# Patient Record
Sex: FEMALE | Race: WHITE | NOT HISPANIC OR LATINO | URBAN - METROPOLITAN AREA
[De-identification: names, ages, dates, MRNs, and addresses within clinical notes are randomized per-mention and may not be internally consistent; named-entity substitution may affect disease eponyms.]

---

## 2019-10-02 ENCOUNTER — OUTPATIENT (OUTPATIENT)
Dept: OUTPATIENT SERVICES | Facility: HOSPITAL | Age: 74
LOS: 1 days | Discharge: HOME | End: 2019-10-02
Payer: MEDICARE

## 2019-10-02 VITALS
HEART RATE: 66 BPM | RESPIRATION RATE: 18 BRPM | SYSTOLIC BLOOD PRESSURE: 132 MMHG | DIASTOLIC BLOOD PRESSURE: 71 MMHG | OXYGEN SATURATION: 97 % | WEIGHT: 123.24 LBS | TEMPERATURE: 98 F | HEIGHT: 62 IN

## 2019-10-02 VITALS — HEIGHT: 62 IN | WEIGHT: 121.25 LBS

## 2019-10-02 DIAGNOSIS — Z98.890 OTHER SPECIFIED POSTPROCEDURAL STATES: ICD-10-CM

## 2019-10-02 DIAGNOSIS — J45.909 UNSPECIFIED ASTHMA, UNCOMPLICATED: ICD-10-CM

## 2019-10-02 DIAGNOSIS — Z91.89 OTHER SPECIFIED PERSONAL RISK FACTORS, NOT ELSEWHERE CLASSIFIED: ICD-10-CM

## 2019-10-02 DIAGNOSIS — Z01.818 ENCOUNTER FOR OTHER PREPROCEDURAL EXAMINATION: ICD-10-CM

## 2019-10-02 DIAGNOSIS — Z98.890 OTHER SPECIFIED POSTPROCEDURAL STATES: Chronic | ICD-10-CM

## 2019-10-02 DIAGNOSIS — M25.551 PAIN IN RIGHT HIP: ICD-10-CM

## 2019-10-02 DIAGNOSIS — M16.11 UNILATERAL PRIMARY OSTEOARTHRITIS, RIGHT HIP: ICD-10-CM

## 2019-10-02 LAB
ANION GAP SERPL CALC-SCNC: 8 MMOL/L — SIGNIFICANT CHANGE UP (ref 5–17)
BLD GP AB SCN SERPL QL: SIGNIFICANT CHANGE UP
BUN SERPL-MCNC: 21 MG/DL — SIGNIFICANT CHANGE UP (ref 7–23)
CALCIUM SERPL-MCNC: 9.8 MG/DL — SIGNIFICANT CHANGE UP (ref 8.5–10.1)
CHLORIDE SERPL-SCNC: 106 MMOL/L — SIGNIFICANT CHANGE UP (ref 96–108)
CO2 SERPL-SCNC: 28 MMOL/L — SIGNIFICANT CHANGE UP (ref 22–31)
CREAT SERPL-MCNC: 0.9 MG/DL — SIGNIFICANT CHANGE UP (ref 0.5–1.3)
GLUCOSE SERPL-MCNC: 124 MG/DL — HIGH (ref 70–99)
HBA1C BLD-MCNC: 5.6 % — SIGNIFICANT CHANGE UP (ref 4–5.6)
HCT VFR BLD CALC: 48 % — HIGH (ref 34.5–45)
HGB BLD-MCNC: 15.1 G/DL — SIGNIFICANT CHANGE UP (ref 11.5–15.5)
INR BLD: 0.98 RATIO — SIGNIFICANT CHANGE UP (ref 0.88–1.16)
MCHC RBC-ENTMCNC: 27.5 PG — SIGNIFICANT CHANGE UP (ref 27–34)
MCHC RBC-ENTMCNC: 31.5 GM/DL — LOW (ref 32–36)
MCV RBC AUTO: 87.4 FL — SIGNIFICANT CHANGE UP (ref 80–100)
MRSA PCR RESULT.: SIGNIFICANT CHANGE UP
NRBC # BLD: 0 /100 WBCS — SIGNIFICANT CHANGE UP (ref 0–0)
PLATELET # BLD AUTO: 223 K/UL — SIGNIFICANT CHANGE UP (ref 150–400)
POTASSIUM SERPL-MCNC: 3.9 MMOL/L — SIGNIFICANT CHANGE UP (ref 3.5–5.3)
POTASSIUM SERPL-SCNC: 3.9 MMOL/L — SIGNIFICANT CHANGE UP (ref 3.5–5.3)
PROTHROM AB SERPL-ACNC: 11 SEC — SIGNIFICANT CHANGE UP (ref 10–12.9)
RBC # BLD: 5.49 M/UL — HIGH (ref 3.8–5.2)
RBC # FLD: 14.9 % — HIGH (ref 10.3–14.5)
S AUREUS DNA NOSE QL NAA+PROBE: SIGNIFICANT CHANGE UP
SODIUM SERPL-SCNC: 142 MMOL/L — SIGNIFICANT CHANGE UP (ref 135–145)
WBC # BLD: 8.55 K/UL — SIGNIFICANT CHANGE UP (ref 3.8–10.5)
WBC # FLD AUTO: 8.55 K/UL — SIGNIFICANT CHANGE UP (ref 3.8–10.5)

## 2019-10-02 PROCEDURE — 93010 ELECTROCARDIOGRAM REPORT: CPT

## 2019-10-02 NOTE — OCCUPATIONAL THERAPY INITIAL EVALUATION ADULT - ADDITIONAL COMMENTS
Patient lives with  and son (Who can provide limited assist post op) in a private house with 5 steps to enter with a R handrail. Once inside, the patient has 8-9 steps with bilateral handrails that are reachable to the second floor where the main bedroom and bathroom is. The patient reported that she can stay on the main floor when she enters. The patients bathroom on the first floor has a walk in shower stall, fixed shower head, standard toilet seat and grab bars. The patient reports that shes can fit a 3/1 commode over the toilet at home. The patient ambulates with SAC in community and no device inside the home. The patient does not own any other device for ambulation. The patient is R handed, drives, wears glasses for reading and does not use hearing aides. The patient daily pain is a 2/10 at rest and a 7-8/10 with movement. The patient takes tylenol for pain management and uses ice and rest for pain management as well. The patient had L knee and ankle frx 7 years ago.

## 2019-10-02 NOTE — PHYSICAL THERAPY INITIAL EVALUATION ADULT - PERTINENT HX OF CURRENT PROBLEM, REHAB EVAL
Patient attends pre-op testing today following consult c Dr. El due to chronic pain to R hip. Elective R OBED is now scheduled in this facility for 10/15/2019.

## 2019-10-02 NOTE — PHYSICAL THERAPY INITIAL EVALUATION ADULT - ACTIVE RANGE OF MOTION EXAMINATION, REHAB EVAL
[place]. upper extremity Active ROM was WNL (within normal limits)/deficits as listed below/except R hip limited due to pain./bilateral lower extremity Active ROM was WNL (within normal limits)

## 2019-10-02 NOTE — H&P PST ADULT - NSICDXPASTMEDICALHX_GEN_ALL_CORE_FT
PAST MEDICAL HISTORY:  Anxiety disorder     Asthma     History of abdominal aortic aneurysm (AAA) Stent placed 3/22/19    HLD (hyperlipidemia)     OA (osteoarthritis) Right Hip

## 2019-10-02 NOTE — H&P PST ADULT - NSANTHOSAYNRD_GEN_A_CORE
No. KLAUS screening performed.  STOP BANG Legend: 0-2 = LOW Risk; 3-4 = INTERMEDIATE Risk; 5-8 = HIGH Risk

## 2019-10-02 NOTE — H&P PST ADULT - NEUROLOGICAL DETAILS
sensation intact/deep reflexes intact/alert and oriented x 3/responds to pain/responds to verbal commands

## 2019-10-02 NOTE — H&P PST ADULT - HISTORY OF PRESENT ILLNESS
74 year old female, PMH Asthma, HLD, Anxiety, OA right hip, AAA with stent 3/22/19 present to presurgical testing prior to scheduled right total hip replacement on 10/15/19. Patient c/o right hip pain 4/10 that started 18 months ago and continues to worsens.

## 2019-10-02 NOTE — OCCUPATIONAL THERAPY INITIAL EVALUATION ADULT - GENERAL OBSERVATIONS, REHAB EVAL
Chart reviewed. Patient seen seated in chair in PT/OT preoperative assessment room with no apparent distress. Patient underwent pre-operative consultation to determine current functional mobility limitations to determine appropriate need for assistive devices.

## 2019-10-02 NOTE — OCCUPATIONAL THERAPY INITIAL EVALUATION ADULT - FINE MOTOR COORDINATION, FINE MOTOR COORDINATION TESTS, OT EVAL
Patient-specific activity scoring scheme (Point to one number): 0 -------5-------- 10 (0) =Unable to perform activity (10) -- Able to perform activity at the same level as before injury or problem. Walking__5____, Activity: Standing_____5_______

## 2019-10-02 NOTE — H&P PST ADULT - RS GEN PE MLT RESP DETAILS PC
airway patent/breath sounds equal/good air movement/clear to auscultation bilaterally/no chest wall tenderness

## 2019-10-02 NOTE — H&P PST ADULT - ASSESSMENT
74 year old female, scheduled for right hip replacement 10/15/19. Medical, Cardiac and Pulmonary clearance pending.  CAPRINI SCORE [CLOT]    AGE RELATED RISK FACTORS                                                       MOBILITY RELATED FACTORS  [ ] Age 41-60 years                                            (1 Point)                  [ ] Bed rest                                                        (1 Point)  [ ] Age: 61-74 years                                           (2 Points)                 [ ] Plaster cast                                                   (2 Points)  x Age= 75 years                                              (3 Points)                 [ ] Bed bound for more than 72 hours                 (2 Points)    DISEASE RELATED RISK FACTORS                                               GENDER SPECIFIC FACTORS  [ ] Edema in the lower extremities                       (1 Point)                  [ ] Pregnancy                                                     (1 Point)  [ ] Varicose veins                                               (1 Point)                  [ ] Post-partum < 6 weeks                                   (1 Point)             [ ] BMI > 25 Kg/m2                                            (1 Point)                  [ ] Hormonal therapy  or oral contraception          (1 Point)                 [ ] Sepsis (in the previous month)                        (1 Point)                  [ ] History of pregnancy complications                 (1 point)  [ ] Pneumonia or serious lung disease                                               [ ] Unexplained or recurrent                     (1 Point)           (in the previous month)                               (1 Point)  [ ] Abnormal pulmonary function test                     (1 Point)                 SURGERY RELATED RISK FACTORS  [ ] Acute myocardial infarction                              (1 Point)                 [ ]  Section                                             (1 Point)  [ ] Congestive heart failure (in the previous month)  (1 Point)               [ ] Minor surgery                                                  (1 Point)   [ ] Inflammatory bowel disease                             (1 Point)                 [x ] Arthroscopic surgery                                        (2 Points)  [ ] Central venous access                                      (2 Points)                [ ] General surgery lasting more than 45 minutes   (2 Points)       [ ] Stroke (in the previous month)                          (5 Points)               [ ] Elective arthroplasty                                         (5 Points)                                                                                                                                               HEMATOLOGY RELATED FACTORS                                                 TRAUMA RELATED RISK FACTORS  [ ] Prior episodes of VTE                                     (3 Points)                [ ] Fracture of the hip, pelvis, or leg                       (5 Points)  [ ] Positive family history for VTE                         (3 Points)                 [ ] Acute spinal cord injury (in the previous month)  (5 Points)  [ ] Prothrombin 81070 A                                     (3 Points)                 [ ] Paralysis  (less than 1 month)                             (5 Points)  [ ] Factor V Leiden                                             (3 Points)                  [ ] Multiple Trauma within 1 month                        (5 Points)  [ ] Lupus anticoagulants                                     (3 Points)                                                           [ ] Anticardiolipin antibodies                               (3 Points)                                                       [ ] High homocysteine in the blood                      (3 Points)                                             [ ] Other congenital or acquired thrombophilia      (3 Points)                                                [ ] Heparin induced thrombocytopenia                  (3 Points)                                          Total Score [   4       ]    Caprini Score 0 - 2:  Low Risk, No VTE Prophylaxis required for most patients, encourage ambulation  Caprini Score 3 - 6:  At Risk, pharmacologic VTE prophylaxis is indicated for most patients (in the absence of a contraindication)  Caprini Score Greater than or = 7:  High Risk, pharmacologic VTE prophylaxis is indicated for most patients (in the absence of a contraindication)

## 2019-10-02 NOTE — H&P PST ADULT - NSICDXPROBLEM_GEN_ALL_CORE_FT
PROBLEM DIAGNOSES  Problem: S/P AAA repair  Assessment and Plan: Cardiology [place] pending    Problem: Asthma  Assessment and Plan: Pulm clearance pending    Problem: Right hip pain  Assessment and Plan: Right total hip replacement scheduled.    Problem: At high risk for venous thromboembolism (VTE)  Assessment and Plan: Require DVT prophylaxis after surgery

## 2019-10-02 NOTE — PHYSICAL THERAPY INITIAL EVALUATION ADULT - GENERAL OBSERVATIONS, REHAB EVAL
Chart reviewed. Patient seen seated in a chair in the rehab pre-op room with no apparent distress. Patient underwent pre-operative consultation to determine current functional mobility limitations to determine appropriate need for assistive devices.

## 2019-10-02 NOTE — PHYSICAL THERAPY INITIAL EVALUATION ADULT - RANGE OF MOTION EXAMINATION, REHAB EVAL
except R hip limited due to pain./bilateral upper extremity ROM was WNL (within normal limits)/bilateral lower extremity was ROM was WNL (within normal limits)/deficits as listed below

## 2019-10-02 NOTE — PHYSICAL THERAPY INITIAL EVALUATION ADULT - ADDITIONAL COMMENTS
Pt lives with her  and son (whom can provide assist upon D/C home) in a private home, 5 entry steps c R rail up, 8-9 steps c B/L rails(reachable) inside home.  Pt has a 2nd floor tub/shower combo, and 1st floor walk-in shower stall. Both with a fixed shower head, standard toilet seat height, & + grab bar. Pt states she is currently independent with all functional mobility including community ambulation with straight cane. Pt states she is independent with ADL's as well. Pt is right hand dominant, wears eye glasses, drives, & is out of work on disability. Pt reports daily 2/10 pain & states it is worse with any activity. Pt endorses taking narcotics for pain management. Goal of therapy: manage pain & improve functional mobility.

## 2020-01-04 PROBLEM — E78.5 HYPERLIPIDEMIA, UNSPECIFIED: Chronic | Status: ACTIVE | Noted: 2019-10-02

## 2020-01-04 PROBLEM — Z86.79 PERSONAL HISTORY OF OTHER DISEASES OF THE CIRCULATORY SYSTEM: Chronic | Status: ACTIVE | Noted: 2019-10-02

## 2020-01-04 PROBLEM — M19.90 UNSPECIFIED OSTEOARTHRITIS, UNSPECIFIED SITE: Chronic | Status: ACTIVE | Noted: 2019-10-02

## 2020-01-04 PROBLEM — F41.9 ANXIETY DISORDER, UNSPECIFIED: Chronic | Status: ACTIVE | Noted: 2019-10-02

## 2020-01-04 PROBLEM — J45.909 UNSPECIFIED ASTHMA, UNCOMPLICATED: Chronic | Status: ACTIVE | Noted: 2019-10-02

## 2020-01-07 ENCOUNTER — OUTPATIENT (OUTPATIENT)
Dept: OUTPATIENT SERVICES | Facility: HOSPITAL | Age: 75
LOS: 1 days | Discharge: HOME | End: 2020-01-07
Payer: MEDICARE

## 2020-01-07 VITALS
OXYGEN SATURATION: 99 % | SYSTOLIC BLOOD PRESSURE: 133 MMHG | DIASTOLIC BLOOD PRESSURE: 81 MMHG | TEMPERATURE: 98 F | HEART RATE: 71 BPM | WEIGHT: 126.99 LBS | HEIGHT: 62 IN | RESPIRATION RATE: 18 BRPM

## 2020-01-07 VITALS — WEIGHT: 126.99 LBS | HEIGHT: 62 IN

## 2020-01-07 DIAGNOSIS — Z98.890 OTHER SPECIFIED POSTPROCEDURAL STATES: Chronic | ICD-10-CM

## 2020-01-07 DIAGNOSIS — Z01.818 ENCOUNTER FOR OTHER PREPROCEDURAL EXAMINATION: ICD-10-CM

## 2020-01-07 DIAGNOSIS — E78.5 HYPERLIPIDEMIA, UNSPECIFIED: ICD-10-CM

## 2020-01-07 DIAGNOSIS — Z86.79 PERSONAL HISTORY OF OTHER DISEASES OF THE CIRCULATORY SYSTEM: ICD-10-CM

## 2020-01-07 DIAGNOSIS — J45.909 UNSPECIFIED ASTHMA, UNCOMPLICATED: ICD-10-CM

## 2020-01-07 DIAGNOSIS — M19.90 UNSPECIFIED OSTEOARTHRITIS, UNSPECIFIED SITE: ICD-10-CM

## 2020-01-07 LAB
ANION GAP SERPL CALC-SCNC: 6 MMOL/L — SIGNIFICANT CHANGE UP (ref 5–17)
APTT BLD: 30.3 SEC — SIGNIFICANT CHANGE UP (ref 27.5–36.3)
BLD GP AB SCN SERPL QL: SIGNIFICANT CHANGE UP
BUN SERPL-MCNC: 22 MG/DL — SIGNIFICANT CHANGE UP (ref 7–23)
CALCIUM SERPL-MCNC: 9.3 MG/DL — SIGNIFICANT CHANGE UP (ref 8.5–10.1)
CHLORIDE SERPL-SCNC: 107 MMOL/L — SIGNIFICANT CHANGE UP (ref 96–108)
CO2 SERPL-SCNC: 28 MMOL/L — SIGNIFICANT CHANGE UP (ref 22–31)
CREAT SERPL-MCNC: 0.84 MG/DL — SIGNIFICANT CHANGE UP (ref 0.5–1.3)
GLUCOSE SERPL-MCNC: 94 MG/DL — SIGNIFICANT CHANGE UP (ref 70–99)
HBA1C BLD-MCNC: 5.8 % — HIGH (ref 4–5.6)
HCT VFR BLD CALC: 46.7 % — HIGH (ref 34.5–45)
HGB BLD-MCNC: 14.8 G/DL — SIGNIFICANT CHANGE UP (ref 11.5–15.5)
INR BLD: 0.96 RATIO — SIGNIFICANT CHANGE UP (ref 0.88–1.16)
MCHC RBC-ENTMCNC: 27.5 PG — SIGNIFICANT CHANGE UP (ref 27–34)
MCHC RBC-ENTMCNC: 31.7 GM/DL — LOW (ref 32–36)
MCV RBC AUTO: 86.8 FL — SIGNIFICANT CHANGE UP (ref 80–100)
MRSA PCR RESULT.: SIGNIFICANT CHANGE UP
NRBC # BLD: 0 /100 WBCS — SIGNIFICANT CHANGE UP (ref 0–0)
PLATELET # BLD AUTO: 240 K/UL — SIGNIFICANT CHANGE UP (ref 150–400)
POTASSIUM SERPL-MCNC: 4.1 MMOL/L — SIGNIFICANT CHANGE UP (ref 3.5–5.3)
POTASSIUM SERPL-SCNC: 4.1 MMOL/L — SIGNIFICANT CHANGE UP (ref 3.5–5.3)
PROTHROM AB SERPL-ACNC: 10.7 SEC — SIGNIFICANT CHANGE UP (ref 10–12.9)
RBC # BLD: 5.38 M/UL — HIGH (ref 3.8–5.2)
RBC # FLD: 14.6 % — HIGH (ref 10.3–14.5)
S AUREUS DNA NOSE QL NAA+PROBE: SIGNIFICANT CHANGE UP
SODIUM SERPL-SCNC: 141 MMOL/L — SIGNIFICANT CHANGE UP (ref 135–145)
WBC # BLD: 14.32 K/UL — HIGH (ref 3.8–10.5)
WBC # FLD AUTO: 14.32 K/UL — HIGH (ref 3.8–10.5)

## 2020-01-07 PROCEDURE — 93010 ELECTROCARDIOGRAM REPORT: CPT

## 2020-01-07 RX ORDER — THEOPHYLLINE ANHYDROUS 200 MG
1 TABLET, EXTENDED RELEASE 12 HR ORAL
Qty: 0 | Refills: 0 | DISCHARGE

## 2020-01-07 NOTE — H&P PST ADULT - ASSESSMENT
75 y/o F w/ PMHX of HLD, Asthma, OA, AAA s/p stent (3/22/2019) now here w/ right hip pain scheduled for Right Hip Replacement.  Denies of chest pain, SOB, headache, dizziness, N/V/D, abdominal pain.

## 2020-01-07 NOTE — H&P PST ADULT - NSICDXPROBLEM_GEN_ALL_CORE_FT
PROBLEM DIAGNOSES  Problem: OA (osteoarthritis)  Assessment and Plan: - scheduled for Right Hip replacement  - awaiting medical, cardiac, and vascular surgeon clearance  - NPO after midnight for surgery  - patient is not on ASA, Plavix or any anticoagulants      Problem: History of abdominal aortic aneurysm (AAA)  Assessment and Plan: - awaiting vascular clearance  - NM stress test =negative    Problem: Hyperlipemia  Assessment and Plan: c/w Simvastatin    Problem: Asthma  Assessment and Plan: c/w Prednisone, Symbicort

## 2020-01-07 NOTE — H&P PST ADULT - EKG AND INTERPRETATION
NSR @ 69bpm, T wave inversion on V4, V5, AVL GA interval: 106ms, QRS duration: 70ms, QT/QTc 372/398ms, NM stress test=negative

## 2020-01-07 NOTE — PHYSICAL THERAPY INITIAL EVALUATION ADULT - PERTINENT HX OF CURRENT PROBLEM, REHAB EVAL
Patient attends pre-op testing today following consult c Dr. El due to chronic pain to R hip. Elective R OEBD(posterior) is now scheduled in this facility for 1/21/2020.

## 2020-01-07 NOTE — PHYSICAL THERAPY INITIAL EVALUATION ADULT - ACTIVE RANGE OF MOTION EXAMINATION, REHAB EVAL
deficits as listed below/except R hip limited due to pain./bilateral lower extremity Active ROM was WNL (within normal limits)/[place]. upper extremity Active ROM was WNL (within normal limits)

## 2020-01-07 NOTE — PHYSICAL THERAPY INITIAL EVALUATION ADULT - ADDITIONAL COMMENTS
Pt lives with her  (whom can provide limited assist upon D/C home) in a private home, 1 entry step(no rail, wide enough to place a rolling walker), inside home c 5 steps c L rail up + 1 flight of stairs c B/L rails(reachable).  Pt has a tub/shower combo on the 2nd floor and walk-in shower stall on 1st floor with a fixed shower head, standard toilet seat height, & + grab bar. Pt states she is currently independent with all functional mobility including community ambulation with straight cane. Pt states she is independent with ADL's as well. Pt is right hand dominant, wears eye glasses, drives, & is retired. Pt reports daily 2/10 pain & states it is worse with any activity. Pt endorses taking pain medications.

## 2020-01-07 NOTE — H&P PST ADULT - HISTORY OF PRESENT ILLNESS
73 y/o F w/ PMHX of HLD, Asthma, OA, AAA s/p stent (3/22/2019) now here w/ right hip pain scheduled for Right Hip Replacement.  Denies of chest pain, SOB, headache, dizziness, N/V/D, abdominal pain.

## 2020-01-20 ENCOUNTER — TRANSCRIPTION ENCOUNTER (OUTPATIENT)
Age: 75
End: 2020-01-20

## 2020-01-20 RX ORDER — PANTOPRAZOLE SODIUM 20 MG/1
40 TABLET, DELAYED RELEASE ORAL
Refills: 0 | Status: DISCONTINUED | OUTPATIENT
Start: 2020-01-21 | End: 2020-01-27

## 2020-01-20 RX ORDER — SENNA PLUS 8.6 MG/1
2 TABLET ORAL AT BEDTIME
Refills: 0 | Status: DISCONTINUED | OUTPATIENT
Start: 2020-01-21 | End: 2020-01-27

## 2020-01-20 RX ORDER — FERROUS SULFATE 325(65) MG
325 TABLET ORAL
Refills: 0 | Status: DISCONTINUED | OUTPATIENT
Start: 2020-01-21 | End: 2020-01-22

## 2020-01-20 RX ORDER — HYDROMORPHONE HYDROCHLORIDE 2 MG/ML
0.5 INJECTION INTRAMUSCULAR; INTRAVENOUS; SUBCUTANEOUS EVERY 4 HOURS
Refills: 0 | Status: DISCONTINUED | OUTPATIENT
Start: 2020-01-21 | End: 2020-01-27

## 2020-01-20 RX ORDER — FOLIC ACID 0.8 MG
1 TABLET ORAL DAILY
Refills: 0 | Status: DISCONTINUED | OUTPATIENT
Start: 2020-01-21 | End: 2020-01-22

## 2020-01-20 RX ORDER — MAGNESIUM HYDROXIDE 400 MG/1
30 TABLET, CHEWABLE ORAL DAILY
Refills: 0 | Status: DISCONTINUED | OUTPATIENT
Start: 2020-01-21 | End: 2020-01-27

## 2020-01-20 RX ORDER — POLYETHYLENE GLYCOL 3350 17 G/17G
17 POWDER, FOR SOLUTION ORAL DAILY
Refills: 0 | Status: DISCONTINUED | OUTPATIENT
Start: 2020-01-21 | End: 2020-01-27

## 2020-01-20 RX ORDER — ACETAMINOPHEN 500 MG
975 TABLET ORAL EVERY 8 HOURS
Refills: 0 | Status: COMPLETED | OUTPATIENT
Start: 2020-01-21 | End: 2020-01-24

## 2020-01-20 RX ORDER — SODIUM CHLORIDE 9 MG/ML
1000 INJECTION INTRAMUSCULAR; INTRAVENOUS; SUBCUTANEOUS
Refills: 0 | Status: DISCONTINUED | OUTPATIENT
Start: 2020-01-21 | End: 2020-01-25

## 2020-01-20 RX ORDER — ONDANSETRON 8 MG/1
4 TABLET, FILM COATED ORAL EVERY 6 HOURS
Refills: 0 | Status: DISCONTINUED | OUTPATIENT
Start: 2020-01-21 | End: 2020-01-27

## 2020-01-21 ENCOUNTER — INPATIENT (INPATIENT)
Facility: HOSPITAL | Age: 75
LOS: 5 days | Discharge: HOME HEALTH | End: 2020-01-27
Attending: ORTHOPAEDIC SURGERY | Admitting: ORTHOPAEDIC SURGERY
Payer: MEDICARE

## 2020-01-21 ENCOUNTER — RESULT REVIEW (OUTPATIENT)
Age: 75
End: 2020-01-21

## 2020-01-21 VITALS
HEART RATE: 78 BPM | TEMPERATURE: 99 F | RESPIRATION RATE: 16 BRPM | HEIGHT: 62 IN | WEIGHT: 125 LBS | SYSTOLIC BLOOD PRESSURE: 132 MMHG | DIASTOLIC BLOOD PRESSURE: 70 MMHG | OXYGEN SATURATION: 99 %

## 2020-01-21 DIAGNOSIS — Z98.890 OTHER SPECIFIED POSTPROCEDURAL STATES: Chronic | ICD-10-CM

## 2020-01-21 LAB
BLD GP AB SCN SERPL QL: SIGNIFICANT CHANGE UP
HCT VFR BLD CALC: 41.6 % — SIGNIFICANT CHANGE UP (ref 34.5–45)
HGB BLD-MCNC: 13.1 G/DL — SIGNIFICANT CHANGE UP (ref 11.5–15.5)
MCHC RBC-ENTMCNC: 27.9 PG — SIGNIFICANT CHANGE UP (ref 27–34)
MCHC RBC-ENTMCNC: 31.5 GM/DL — LOW (ref 32–36)
MCV RBC AUTO: 88.5 FL — SIGNIFICANT CHANGE UP (ref 80–100)
NRBC # BLD: 0 /100 WBCS — SIGNIFICANT CHANGE UP (ref 0–0)
PLATELET # BLD AUTO: 168 K/UL — SIGNIFICANT CHANGE UP (ref 150–400)
RBC # BLD: 4.7 M/UL — SIGNIFICANT CHANGE UP (ref 3.8–5.2)
RBC # FLD: 14.6 % — HIGH (ref 10.3–14.5)
WBC # BLD: 13.07 K/UL — HIGH (ref 3.8–10.5)
WBC # FLD AUTO: 13.07 K/UL — HIGH (ref 3.8–10.5)

## 2020-01-21 PROCEDURE — 88311 DECALCIFY TISSUE: CPT | Mod: 26

## 2020-01-21 PROCEDURE — 88305 TISSUE EXAM BY PATHOLOGIST: CPT | Mod: 26

## 2020-01-21 RX ORDER — DIAZEPAM 5 MG
2 TABLET ORAL DAILY
Refills: 0 | Status: DISCONTINUED | OUTPATIENT
Start: 2020-01-21 | End: 2020-01-27

## 2020-01-21 RX ORDER — ACETAMINOPHEN 500 MG
650 TABLET ORAL ONCE
Refills: 0 | Status: COMPLETED | OUTPATIENT
Start: 2020-01-21 | End: 2020-01-24

## 2020-01-21 RX ORDER — OXYCODONE HYDROCHLORIDE 5 MG/1
5 TABLET ORAL EVERY 4 HOURS
Refills: 0 | Status: DISCONTINUED | OUTPATIENT
Start: 2020-01-21 | End: 2020-01-26

## 2020-01-21 RX ORDER — SIMVASTATIN 20 MG/1
1 TABLET, FILM COATED ORAL
Qty: 0 | Refills: 0 | DISCHARGE

## 2020-01-21 RX ORDER — SODIUM CHLORIDE 9 MG/ML
1000 INJECTION, SOLUTION INTRAVENOUS
Refills: 0 | Status: DISCONTINUED | OUTPATIENT
Start: 2020-01-21 | End: 2020-01-21

## 2020-01-21 RX ORDER — FENTANYL CITRATE 50 UG/ML
50 INJECTION INTRAVENOUS
Refills: 0 | Status: DISCONTINUED | OUTPATIENT
Start: 2020-01-21 | End: 2020-01-21

## 2020-01-21 RX ORDER — OXYCODONE HYDROCHLORIDE 5 MG/1
10 TABLET ORAL EVERY 4 HOURS
Refills: 0 | Status: DISCONTINUED | OUTPATIENT
Start: 2020-01-21 | End: 2020-01-26

## 2020-01-21 RX ORDER — ACETAMINOPHEN 500 MG
650 TABLET ORAL ONCE
Refills: 0 | Status: COMPLETED | OUTPATIENT
Start: 2020-01-21 | End: 2020-01-21

## 2020-01-21 RX ORDER — FENTANYL CITRATE 50 UG/ML
25 INJECTION INTRAVENOUS
Refills: 0 | Status: DISCONTINUED | OUTPATIENT
Start: 2020-01-21 | End: 2020-01-21

## 2020-01-21 RX ORDER — SIMVASTATIN 20 MG/1
20 TABLET, FILM COATED ORAL AT BEDTIME
Refills: 0 | Status: DISCONTINUED | OUTPATIENT
Start: 2020-01-21 | End: 2020-01-27

## 2020-01-21 RX ORDER — ALBUTEROL 90 UG/1
2 AEROSOL, METERED ORAL
Qty: 0 | Refills: 0 | DISCHARGE

## 2020-01-21 RX ORDER — ALBUTEROL 90 UG/1
2 AEROSOL, METERED ORAL EVERY 6 HOURS
Refills: 0 | Status: DISCONTINUED | OUTPATIENT
Start: 2020-01-21 | End: 2020-01-27

## 2020-01-21 RX ORDER — CEFAZOLIN SODIUM 1 G
2000 VIAL (EA) INJECTION EVERY 8 HOURS
Refills: 0 | Status: COMPLETED | OUTPATIENT
Start: 2020-01-21 | End: 2020-01-22

## 2020-01-21 RX ORDER — RIVAROXABAN 15 MG-20MG
10 KIT ORAL DAILY
Refills: 0 | Status: DISCONTINUED | OUTPATIENT
Start: 2020-01-22 | End: 2020-01-27

## 2020-01-21 RX ADMIN — SIMVASTATIN 20 MILLIGRAM(S): 20 TABLET, FILM COATED ORAL at 21:14

## 2020-01-21 RX ADMIN — Medication 100 MILLIGRAM(S): at 20:17

## 2020-01-21 RX ADMIN — SODIUM CHLORIDE 100 MILLILITER(S): 9 INJECTION, SOLUTION INTRAVENOUS at 13:50

## 2020-01-21 RX ADMIN — Medication 325 MILLIGRAM(S): at 16:28

## 2020-01-21 RX ADMIN — Medication 650 MILLIGRAM(S): at 10:25

## 2020-01-21 RX ADMIN — Medication 975 MILLIGRAM(S): at 22:15

## 2020-01-21 RX ADMIN — SODIUM CHLORIDE 100 MILLILITER(S): 9 INJECTION INTRAMUSCULAR; INTRAVENOUS; SUBCUTANEOUS at 15:46

## 2020-01-21 RX ADMIN — Medication 975 MILLIGRAM(S): at 21:15

## 2020-01-21 NOTE — PHYSICAL THERAPY INITIAL EVALUATION ADULT - GAIT TRAINING, PT EVAL
Patient will ambulate 500 feet with rolling walker independently for community ambulation in 5-7 days.

## 2020-01-21 NOTE — OCCUPATIONAL THERAPY INITIAL EVALUATION ADULT - TRANSFER SAFETY CONCERNS NOTED: SIT/STAND, REHAB EVAL
decreased step length/decreased balance during turns/losing balance/decreased weight-shifting ability

## 2020-01-21 NOTE — CONSULT NOTE ADULT - SUBJECTIVE AND OBJECTIVE BOX
MAYTE PEREIRA is a 74y Female s/p RIGHT ANTERIOR TOTAL HIP REPLACEMENT  RIGHT POSTERIOR TOTAL HIP REPLACEMENT    w/ h/o History of abdominal aortic aneurysm (AAA)  Anxiety disorder  OA (osteoarthritis)  HLD (hyperlipidemia)  Asthma    denies any chest pain shortness of breath palpitation dizziness lightheadedness nausea vomiting fever or chills    S/P left knee surgery  S/P AAA repair      SH: doesnot smoke or drink at this time    aspirin (Short breath)  penicillin (Stomach Upset)  Sulfacetamide Sodium-Sulfur (Stomach Upset)    acetaminophen   Tablet .. 650 milliGRAM(s) Oral once  acetaminophen   Tablet .. 975 milliGRAM(s) Oral every 8 hours  ALBUTerol    90 MICROgram(s) HFA Inhaler 2 Puff(s) Inhalation every 6 hours PRN  aluminum hydroxide/magnesium hydroxide/simethicone Suspension 30 milliLiter(s) Oral four times a day PRN  ceFAZolin   IVPB 2000 milliGRAM(s) IV Intermittent every 8 hours  diazepam    Tablet 2 milliGRAM(s) Oral daily PRN  ferrous    sulfate 325 milliGRAM(s) Oral three times a day with meals  folic acid 1 milliGRAM(s) Oral daily  HYDROmorphone  Injectable 0.5 milliGRAM(s) IV Push every 4 hours PRN  magnesium hydroxide Suspension 30 milliLiter(s) Oral daily PRN  multivitamin 1 Tablet(s) Oral daily  ondansetron Injectable 4 milliGRAM(s) IV Push every 6 hours PRN  oxyCODONE    IR 5 milliGRAM(s) Oral every 4 hours PRN  oxyCODONE    IR 10 milliGRAM(s) Oral every 4 hours PRN  pantoprazole    Tablet 40 milliGRAM(s) Oral before breakfast  polyethylene glycol 3350 17 Gram(s) Oral daily  senna 2 Tablet(s) Oral at bedtime PRN  simvastatin 20 milliGRAM(s) Oral at bedtime  sodium chloride 0.9%. 1000 milliLiter(s) IV Continuous <Continuous>    T(C): 36.6 (01-21-20 @ 16:57), Max: 37 (01-21-20 @ 09:06)  HR: 66 (01-21-20 @ 19:12) (62 - 78)  BP: 108/48 (01-21-20 @ 19:12) (101/57 - 132/70)  RR: 18 (01-21-20 @ 19:12) (14 - 18)  SpO2: 97% (01-21-20 @ 19:12) (96% - 100%)  HEENT unremarkable  neck no JVD or bruit  heart normal S1 S2 RRR no gallops or rubs  chest clear to auscultation  abd sof nontender non distended +bs  ext no calf tenderness    A/P   DVT PX  pain control  bowel regimen   wound care as per ortho  GI PX  antiemetics prn  incentive spirometer  chol control  resp rx

## 2020-01-21 NOTE — PHYSICAL THERAPY INITIAL EVALUATION ADULT - CRITERIA FOR SKILLED THERAPEUTIC INTERVENTIONS
impairments found/functional limitations in following categories/predicted duration of therapy intervention/therapy frequency/anticipated equipment needs at discharge/anticipated discharge recommendation/risk reduction/prevention/rehab potential

## 2020-01-21 NOTE — PROGRESS NOTE ADULT - SUBJECTIVE AND OBJECTIVE BOX
POC  75 yo Female s/p right anterior OBED pod 0.  Pt seen and examined at bedside in NAD.  Currently, pain is well controlled.  Pt denies any new complaints.  Pt explains that when she takes aspirin she becomes short of breath.  Pt denies CP/SOB/N/V/D/numbness/tingling/bowel or bladder dysfunction.     PE:   R LE:  Prineo dressing in place.  Dressing C/D/I.  +ROM hip/knee/ankle/toes.  Ankle Dorsi/plantarflexion: 5/5.  Calf: soft, compressible and nontender.  DP/PT 2+  NVI.                         13.1   13.07 )-----------( 168      ( 21 Jan 2020 13:51 )             41.6                 A/P: 74yFemale s/p R anterior OBED pod 0.  Pain controlled  +/- Total hip precautions  PT: RLE WBAT   DVT ppx: SCDs, xarelto, ambulation  Wound care - prineo   Isometric exercises encouraged   incentive spirometry encouraged  Follow up am labs  Abx x 24 hrs  Discharge: planning   All the above discussed and understood by pt

## 2020-01-21 NOTE — PHYSICAL THERAPY INITIAL EVALUATION ADULT - ASR EQUIP NEEDS DISCH PT EVAL
3:1 commode/Patient has own straight cane and reports she received rolling walker Patient has all DME

## 2020-01-21 NOTE — BRIEF OPERATIVE NOTE - NSICDXBRIEFPOSTOP_GEN_ALL_CORE_FT
POST-OP DIAGNOSIS:  Primary localized osteoarthrosis of right hip 21-Jan-2020 13:31:17  Jensen Patten

## 2020-01-21 NOTE — BRIEF OPERATIVE NOTE - NSICDXBRIEFPREOP_GEN_ALL_CORE_FT
PRE-OP DIAGNOSIS:  Primary localized osteoarthrosis of right hip 21-Jan-2020 13:31:05  Jensen Patten

## 2020-01-21 NOTE — PHYSICAL THERAPY INITIAL EVALUATION ADULT - STRENGTHENING, PT EVAL
Patient will improve strength in R hip to 5/5 6-8 weeks to improve overall functional mobility including gait, transfers, bed mobility and decrease risk of falls.

## 2020-01-21 NOTE — PHYSICAL THERAPY INITIAL EVALUATION ADULT - ACTIVE RANGE OF MOTION EXAMINATION, REHAB EVAL
deficits as listed below/bilateral upper extremity Active ROM was WFL (within functional limits)/bilateral  lower extremity Active ROM was WFL (within functional limits)/except R hip within anterior precautions

## 2020-01-21 NOTE — PHYSICAL THERAPY INITIAL EVALUATION ADULT - GAIT DEVIATIONS NOTED, PT EVAL
decreased jeniffer/decreased velocity of limb motion/decreased step length/decreased stride length/decreased weight-shifting ability

## 2020-01-21 NOTE — OCCUPATIONAL THERAPY INITIAL EVALUATION ADULT - ADDITIONAL COMMENTS
Pre op assessment- Pt lives with her  (whom can provide limited assist upon D/C home) in a private home, 1 entry step(no rail, wide enough to place a rolling walker), inside home c 5 steps c L rail up + 1 flight of stairs c B/L rails(reachable).  Pt has a tub/shower combo on the 2nd floor and walk-in shower stall on 1st floor with a fixed shower head, standard toilet seat height, & + grab bar. Pt states she is currently independent with all functional mobility including community ambulation with straight cane. Pt states she is independent with ADL's as well. Pt is right hand dominant, wears eye glasses, drives, & is retired.

## 2020-01-21 NOTE — OCCUPATIONAL THERAPY INITIAL EVALUATION ADULT - PLANNED THERAPY INTERVENTIONS, OT EVAL
ADL retraining/bed mobility training/balance training/ROM/strengthening/stretching/transfer training

## 2020-01-21 NOTE — PHYSICAL THERAPY INITIAL EVALUATION ADULT - ADDITIONAL COMMENTS
As per pre-op and reviewed with patient POD #0 to ensure accuracy: Pt lives with her  (whom can provide limited assist upon D/C home) in a private home, 1 entry step (no rail, wide enough to place a rolling walker), inside home c 5 steps c L rail up + 1 flight of stairs c B/L rails (reachable).  Pt has a tub/shower combo on the 2nd floor and walk-in shower stall on 1st floor with a fixed shower head, standard toilet seat height, & + grab bar. Pt states she is currently independent with all functional mobility including community ambulation with straight cane. Pt states she is independent with ADL's as well. Pt is right hand dominant, wears eye glasses, drives, & is retired. Pt reports daily 2/10 pain & states it is worse with any activity. Pt endorses taking pain medications.

## 2020-01-22 ENCOUNTER — TRANSCRIPTION ENCOUNTER (OUTPATIENT)
Age: 75
End: 2020-01-22

## 2020-01-22 LAB
ANION GAP SERPL CALC-SCNC: 8 MMOL/L — SIGNIFICANT CHANGE UP (ref 5–17)
BUN SERPL-MCNC: 23 MG/DL — SIGNIFICANT CHANGE UP (ref 7–23)
CALCIUM SERPL-MCNC: 8.8 MG/DL — SIGNIFICANT CHANGE UP (ref 8.5–10.1)
CHLORIDE SERPL-SCNC: 110 MMOL/L — HIGH (ref 96–108)
CO2 SERPL-SCNC: 27 MMOL/L — SIGNIFICANT CHANGE UP (ref 22–31)
CREAT SERPL-MCNC: 0.8 MG/DL — SIGNIFICANT CHANGE UP (ref 0.5–1.3)
GLUCOSE SERPL-MCNC: 106 MG/DL — HIGH (ref 70–99)
HCT VFR BLD CALC: 38.9 % — SIGNIFICANT CHANGE UP (ref 34.5–45)
HGB BLD-MCNC: 12.1 G/DL — SIGNIFICANT CHANGE UP (ref 11.5–15.5)
MCHC RBC-ENTMCNC: 27.4 PG — SIGNIFICANT CHANGE UP (ref 27–34)
MCHC RBC-ENTMCNC: 31.1 GM/DL — LOW (ref 32–36)
MCV RBC AUTO: 88.2 FL — SIGNIFICANT CHANGE UP (ref 80–100)
NRBC # BLD: 0 /100 WBCS — SIGNIFICANT CHANGE UP (ref 0–0)
PLATELET # BLD AUTO: 177 K/UL — SIGNIFICANT CHANGE UP (ref 150–400)
POTASSIUM SERPL-MCNC: 4.8 MMOL/L — SIGNIFICANT CHANGE UP (ref 3.5–5.3)
POTASSIUM SERPL-SCNC: 4.8 MMOL/L — SIGNIFICANT CHANGE UP (ref 3.5–5.3)
RBC # BLD: 4.41 M/UL — SIGNIFICANT CHANGE UP (ref 3.8–5.2)
RBC # FLD: 14.9 % — HIGH (ref 10.3–14.5)
SODIUM SERPL-SCNC: 145 MMOL/L — SIGNIFICANT CHANGE UP (ref 135–145)
WBC # BLD: 14.57 K/UL — HIGH (ref 3.8–10.5)
WBC # FLD AUTO: 14.57 K/UL — HIGH (ref 3.8–10.5)

## 2020-01-22 RX ORDER — OXYCODONE HYDROCHLORIDE 5 MG/1
1 TABLET ORAL
Qty: 0 | Refills: 0 | DISCHARGE
Start: 2020-01-22

## 2020-01-22 RX ORDER — UBIDECARENONE 100 MG
1 CAPSULE ORAL
Qty: 0 | Refills: 0 | DISCHARGE

## 2020-01-22 RX ORDER — PANTOPRAZOLE SODIUM 20 MG/1
1 TABLET, DELAYED RELEASE ORAL
Qty: 0 | Refills: 0 | DISCHARGE
Start: 2020-01-22

## 2020-01-22 RX ORDER — POLYETHYLENE GLYCOL 3350 17 G/17G
17 POWDER, FOR SOLUTION ORAL
Qty: 0 | Refills: 0 | DISCHARGE
Start: 2020-01-22

## 2020-01-22 RX ORDER — ACETAMINOPHEN 500 MG
3 TABLET ORAL
Qty: 0 | Refills: 0 | DISCHARGE
Start: 2020-01-22

## 2020-01-22 RX ORDER — RIVAROXABAN 15 MG-20MG
1 KIT ORAL
Qty: 0 | Refills: 0 | DISCHARGE
Start: 2020-01-22

## 2020-01-22 RX ORDER — MAGNESIUM HYDROXIDE 400 MG/1
30 TABLET, CHEWABLE ORAL
Qty: 0 | Refills: 0 | DISCHARGE
Start: 2020-01-22

## 2020-01-22 RX ADMIN — Medication 975 MILLIGRAM(S): at 13:44

## 2020-01-22 RX ADMIN — Medication 975 MILLIGRAM(S): at 06:51

## 2020-01-22 RX ADMIN — Medication 975 MILLIGRAM(S): at 14:44

## 2020-01-22 RX ADMIN — RIVAROXABAN 10 MILLIGRAM(S): KIT at 11:21

## 2020-01-22 RX ADMIN — Medication 975 MILLIGRAM(S): at 07:30

## 2020-01-22 RX ADMIN — PANTOPRAZOLE SODIUM 40 MILLIGRAM(S): 20 TABLET, DELAYED RELEASE ORAL at 06:51

## 2020-01-22 RX ADMIN — POLYETHYLENE GLYCOL 3350 17 GRAM(S): 17 POWDER, FOR SOLUTION ORAL at 11:21

## 2020-01-22 RX ADMIN — SIMVASTATIN 20 MILLIGRAM(S): 20 TABLET, FILM COATED ORAL at 21:26

## 2020-01-22 RX ADMIN — Medication 1 TABLET(S): at 11:22

## 2020-01-22 RX ADMIN — Medication 325 MILLIGRAM(S): at 08:05

## 2020-01-22 RX ADMIN — OXYCODONE HYDROCHLORIDE 5 MILLIGRAM(S): 5 TABLET ORAL at 00:51

## 2020-01-22 RX ADMIN — OXYCODONE HYDROCHLORIDE 5 MILLIGRAM(S): 5 TABLET ORAL at 02:51

## 2020-01-22 RX ADMIN — Medication 10 MILLIGRAM(S): at 12:15

## 2020-01-22 RX ADMIN — Medication 100 MILLIGRAM(S): at 03:36

## 2020-01-22 RX ADMIN — Medication 975 MILLIGRAM(S): at 21:26

## 2020-01-22 RX ADMIN — Medication 975 MILLIGRAM(S): at 22:26

## 2020-01-22 NOTE — DISCHARGE NOTE PROVIDER - CARE PROVIDER_API CALL
Joaquín El)  Orthopaedic Surgery  [address] [place] [address]  Phone: [phone]  Fax: [phone]  Follow Up Time:

## 2020-01-22 NOTE — DISCHARGE NOTE PROVIDER - NSDCMRMEDTOKEN_GEN_ALL_CORE_FT
acetaminophen 325 mg oral tablet: 3 tab(s) orally every 8 hours  diazePAM 2 mg oral tablet: 1 tab(s) orally once a day, As Needed  magnesium hydroxide 8% oral suspension: 30 milliliter(s) orally once a day, As needed, Constipation  oxyCODONE 10 mg oral tablet: 1 tab(s) orally every 4 hours, As needed, Pain 6-10  oxyCODONE 5 mg oral tablet: 1 tab(s) orally every 4 hours, As needed, Pain 1-5  pantoprazole 40 mg oral delayed release tablet: 1 tab(s) orally once a day (before a meal)  polyethylene glycol 3350 oral powder for reconstitution: 17 gram(s) orally once a day  predniSONE 10 mg oral tablet: 1 tab(s) orally once a day  ProAir HFA 90 mcg/inh inhalation aerosol: 2 puff(s) inhaled 4 times a day, As Needed  rivaroxaban 10 mg oral tablet: 1 tab(s) orally once a day for 35 days post operatively  simvastatin 20 mg oral tablet: 1 tab(s) orally once a day (at bedtime) acetaminophen 325 mg oral tablet: 3 tab(s) orally every 8 hours  diazePAM 2 mg oral tablet: 1 tab(s) orally once a day, As Needed  magnesium hydroxide 8% oral suspension: 30 milliliter(s) orally once a day, As needed, Constipation  oxyCODONE 5 mg oral tablet: 1-2  tab(s) orally every 4 hours, As needed, Pain MDD:10  pantoprazole 40 mg oral delayed release tablet: 1 tab(s) orally once a day (before a meal) MDD:1  polyethylene glycol 3350 oral powder for reconstitution: 17 gram(s) orally once a day  predniSONE 10 mg oral tablet: 1 tab(s) orally once a day  ProAir HFA 90 mcg/inh inhalation aerosol: 2 puff(s) inhaled 4 times a day, As Needed  rivaroxaban 10 mg oral tablet: 1 tab(s) orally once a day for 35 days post operatively MDD:1  simvastatin 20 mg oral tablet: 1 tab(s) orally once a day (at bedtime) acetaminophen 325 mg oral tablet: 3 tab(s) orally every 8 hours  diazePAM 2 mg oral tablet: 1 tab(s) orally once a day, As Needed  magnesium hydroxide 8% oral suspension: 30 milliliter(s) orally once a day, As needed, Constipation  pantoprazole 40 mg oral delayed release tablet: 1 tab(s) orally once a day (before a meal) MDD:1  polyethylene glycol 3350 oral powder for reconstitution: 17 gram(s) orally once a day  predniSONE 10 mg oral tablet: 1 tab(s) orally once a day  ProAir HFA 90 mcg/inh inhalation aerosol: 2 puff(s) inhaled 4 times a day, As Needed  rivaroxaban 10 mg oral tablet: 1 tab(s) orally once a day for 35 days post operatively MDD:1  simvastatin 20 mg oral tablet: 1 tab(s) orally once a day (at bedtime)  traMADol 50 mg oral tablet: 1 tab(s) orally every 4 hours, As needed, pain (4-10) MDD:6 tabs

## 2020-01-22 NOTE — DISCHARGE NOTE PROVIDER - NSDCACTIVITY_GEN_ALL_CORE
Do not drive or operate machinery/Walking - Indoors allowed/Stairs allowed/Walking - Outdoors allowed/Do not make important decisions/No heavy lifting/straining/Showering allowed

## 2020-01-22 NOTE — DISCHARGE NOTE PROVIDER - HOSPITAL COURSE
74yFemale with history of right hip osteoarthritis presenting for right anterior total hip arthroplasty by Dr. Joaquín El on  1/22/2020. Risk and benefits of surgery were explained to the patient. The patient understood and agreed to proceed with surgery. Patient underwent the procedure with no intraoperative complications. Pt was brought in stable condition to the PACU. Once stable in PACU, pt was brought to the floor. During hospital stay pt was followed by Medicine,  during this admission. Pt had an uneventful hospital course. Pt is stable for discharge to rehab on POD# 1 74yFemale with history of right hip osteoarthritis presenting for right anterior total hip arthroplasty by Dr. Joaquín El on  1/22/2020. Risk and benefits of surgery were explained to the patient. The patient understood and agreed to proceed with surgery. Patient underwent the procedure with no intraoperative complications. Pt was brought in stable condition to the PACU. Once stable in PACU, pt was brought to the floor. During hospital stay pt was followed by Medicine,  during this admission. Pt had an uneventful hospital course. Pt is stable for discharge to rehab on POD# 2 74yFemale with history of right hip osteoarthritis presenting for right anterior total hip arthroplasty by Dr. Joaquín El on  1/22/2020. Risk and benefits of surgery were explained to the patient. The patient understood and agreed to proceed with surgery. Patient underwent the procedure with no intraoperative complications. Pt was brought in stable condition to the PACU. Once stable in PACU, pt was brought to the floor. During hospital stay pt was followed by Medicine, OT/PT, social work and home care during this admission. Pt had an uneventful hospital course. Pt was denied rehab placement by her insurance and she remained in the hospital until she was stable for discharge home on POD# 5

## 2020-01-22 NOTE — PROGRESS NOTE ADULT - SUBJECTIVE AND OBJECTIVE BOX
MAYTE PEREIRA is a 74y Female s/p RIGHT ANTERIOR TOTAL HIP REPLACEMENT  RIGHT POSTERIOR TOTAL HIP REPLACEMENT      denies any chest pain shortness of breath palpitation dizziness lightheadedness nausea vomiting fever or chills    T(C): 36.6 (01-22-20 @ 08:05), Max: 36.6 (01-21-20 @ 14:40)  HR: 68 (01-22-20 @ 10:24) (57 - 75)  BP: 113/43 (01-22-20 @ 10:24) (100/59 - 123/60)  RR: 19 (01-22-20 @ 08:05) (14 - 19)  SpO2: 97% (01-22-20 @ 10:24) (95% - 100%)  no jvd/bruit  s1 s2 rrr  cta  s/nt/nd  no calf tend                        12.1   14.57 )-----------( 177      ( 22 Jan 2020 06:30 )             38.9   01-22    145  |  110<H>  |  23  ----------------------------<  106<H>  4.8   |  27  |  0.80    Ca    8.8      22 Jan 2020 06:30        cont dvt px  pain control  bowel regimen  antiemetics  incentive spirometer

## 2020-01-22 NOTE — DISCHARGE NOTE PROVIDER - NSDCFUADDINST_GEN_ALL_CORE_FT
Keep Prineo Dressing Clean, Dry and Intact. May shower with Prineo Dressing. Please do not scrub, soak, peel or pick at the prineo dressing. No creams, lotions, or oils over dressing. May shower and let water run over incision, no baths. Pat dry once out of shower. Dressing to be removed in office at follow up visit in 2 weeks. There are no staples or stitches that need to be removed.  Hip replacement precautions: Abduction pillow. Elevate the leg (while keeping hip precautions) as often as possible to help control swelling. Incentive spirometer 10X/hr.

## 2020-01-22 NOTE — PROGRESS NOTE ADULT - SUBJECTIVE AND OBJECTIVE BOX
Patient is seen and examined at bedside. Denies CP/SOB/Dizziness/N/V/D/HA. Pain is not controlled.     Vital Signs Last 24 Hrs  T(C): 36.6 (22 Jan 2020 08:05), Max: 36.6 (21 Jan 2020 14:40)  T(F): 97.8 (22 Jan 2020 08:05), Max: 97.9 (21 Jan 2020 16:57)  HR: 60 (22 Jan 2020 08:05) (57 - 75)  BP: 100/59 (22 Jan 2020 08:05) (100/59 - 123/60)  BP(mean): --  RR: 19 (22 Jan 2020 08:05) (14 - 19)  SpO2: 98% (22 Jan 2020 08:05) (95% - 100%)    GEN: NAD  ABD: soft, NT/ND; no rebound or guarding;  Neurologic: AAOx3; CNS grossly intact; no focal deficits  RLE: Right hip anterior/groin: Prineo dressing C/D/I. Motor intact + EHL/FHL/TA/GS. Sensation is grossly intact.  Extremities warm. . Compartments soft, compressible. No calf tenderness. DP 2+.  LLE:  Motor intact + EHL/FHL/TA/GS. Sensation is grossly intact. Extremities warm. Compartments soft, compressible. No calf tenderness.. DP 2+.    Labs:                          12.1   14.57 )-----------( 177      ( 22 Jan 2020 06:30 )             38.9       01-22    145  |  110<H>  |  23  ----------------------------<  106<H>  4.8   |  27  |  0.80    Ca    8.8      22 Jan 2020 06:30        A/P: Patient is a 74y y/o Female s/p right anterior total hip arthroplasty, POD # 1  -wound care, isometric exercises, GI motility, new medications, hip precautions,  hospital course and discharge planning reviewed with pt  -Pain control/analgesia counseled  -Inc spirometry reviewed and counseled  -Venodynes/foot pumps  -PT/OT/WBAT  -Anticoagulation: Xarelto  -Pt seen in am with Dr El  -[facility] plan: rehab pending auth. Patient is seen and examined at bedside. Denies CP/SOB/Dizziness/N/V/D/HA. Pain is not controlled.     Vital Signs Last 24 Hrs  T(C): 36.6 (22 Jan 2020 08:05), Max: 36.6 (21 Jan 2020 14:40)  T(F): 97.8 (22 Jan 2020 08:05), Max: 97.9 (21 Jan 2020 16:57)  HR: 60 (22 Jan 2020 08:05) (57 - 75)  BP: 100/59 (22 Jan 2020 08:05) (100/59 - 123/60)  BP(mean): --  RR: 19 (22 Jan 2020 08:05) (14 - 19)  SpO2: 98% (22 Jan 2020 08:05) (95% - 100%)    GEN: NAD  ABD: soft, NT/ND; no rebound or guarding;  Neurologic: AAOx3; CNS grossly intact; no focal deficits  RLE: Right hip anterior/groin: Prineo dressing C/D/I. Motor intact + EHL/FHL/TA/GS. Sensation is grossly intact.  Extremities warm. . Compartments soft, compressible. No calf tenderness. DP 2+.  LLE:  Motor intact + EHL/FHL/TA/GS. Sensation is grossly intact. Extremities warm. Compartments soft, compressible. No calf tenderness.. DP 2+.    Labs:                          12.1   14.57 )-----------( 177      ( 22 Jan 2020 06:30 )             38.9       01-22    145  |  110<H>  |  23  ----------------------------<  106<H>  4.8   |  27  |  0.80    Ca    8.8      22 Jan 2020 06:30        A/P: Patient is a 74y y/o Female s/p right anterior total hip arthroplasty, POD # 1  -wound care, isometric exercises, GI motility, new medications, hip precautions,  hospital course and discharge planning reviewed with pt  -Pain control/analgesia counseled  -Inc spirometry reviewed and counseled - poor technique. Deep breathing exercises reviewed.   -Venodynes/foot pumps  -PT/OT/WBAT  -Anticoagulation: Xarelto  -Pt seen in am with Dr El  -DC plan: rehab pending auth.

## 2020-01-22 NOTE — DISCHARGE NOTE PROVIDER - NSDCFUADDAPPT_GEN_ALL_CORE_FT
Follow up with your surgeon in two weeks. Call for appointment.  If you need more pain medication, call your surgeon's office. For medication refills or authorizations, please call [phone] xt [phone]  We recommend that you call and schedule a follow up appointment with your primary care physician for repeat blood work (CBC and BMP) for post hospital discharge follow-up care.  Call your surgeon if you have increased redness/pain/drainage or fever. Return to ER for shortness of breath/calf tenderness. Attending Only

## 2020-01-23 LAB
ANION GAP SERPL CALC-SCNC: 3 MMOL/L — LOW (ref 5–17)
BUN SERPL-MCNC: 22 MG/DL — SIGNIFICANT CHANGE UP (ref 7–23)
CALCIUM SERPL-MCNC: 8.7 MG/DL — SIGNIFICANT CHANGE UP (ref 8.5–10.1)
CHLORIDE SERPL-SCNC: 109 MMOL/L — HIGH (ref 96–108)
CO2 SERPL-SCNC: 31 MMOL/L — SIGNIFICANT CHANGE UP (ref 22–31)
CREAT SERPL-MCNC: 0.72 MG/DL — SIGNIFICANT CHANGE UP (ref 0.5–1.3)
GLUCOSE SERPL-MCNC: 85 MG/DL — SIGNIFICANT CHANGE UP (ref 70–99)
HCT VFR BLD CALC: 37.1 % — SIGNIFICANT CHANGE UP (ref 34.5–45)
HGB BLD-MCNC: 11.6 G/DL — SIGNIFICANT CHANGE UP (ref 11.5–15.5)
MCHC RBC-ENTMCNC: 27.2 PG — SIGNIFICANT CHANGE UP (ref 27–34)
MCHC RBC-ENTMCNC: 31.3 GM/DL — LOW (ref 32–36)
MCV RBC AUTO: 86.9 FL — SIGNIFICANT CHANGE UP (ref 80–100)
NRBC # BLD: 0 /100 WBCS — SIGNIFICANT CHANGE UP (ref 0–0)
PLATELET # BLD AUTO: 172 K/UL — SIGNIFICANT CHANGE UP (ref 150–400)
POTASSIUM SERPL-MCNC: 4.1 MMOL/L — SIGNIFICANT CHANGE UP (ref 3.5–5.3)
POTASSIUM SERPL-SCNC: 4.1 MMOL/L — SIGNIFICANT CHANGE UP (ref 3.5–5.3)
RBC # BLD: 4.27 M/UL — SIGNIFICANT CHANGE UP (ref 3.8–5.2)
RBC # FLD: 15.2 % — HIGH (ref 10.3–14.5)
SODIUM SERPL-SCNC: 143 MMOL/L — SIGNIFICANT CHANGE UP (ref 135–145)
SURGICAL PATHOLOGY STUDY: SIGNIFICANT CHANGE UP
WBC # BLD: 12.68 K/UL — HIGH (ref 3.8–10.5)
WBC # FLD AUTO: 12.68 K/UL — HIGH (ref 3.8–10.5)

## 2020-01-23 RX ADMIN — RIVAROXABAN 10 MILLIGRAM(S): KIT at 11:27

## 2020-01-23 RX ADMIN — PANTOPRAZOLE SODIUM 40 MILLIGRAM(S): 20 TABLET, DELAYED RELEASE ORAL at 07:53

## 2020-01-23 RX ADMIN — Medication 975 MILLIGRAM(S): at 21:49

## 2020-01-23 RX ADMIN — MAGNESIUM HYDROXIDE 30 MILLILITER(S): 400 TABLET, CHEWABLE ORAL at 05:18

## 2020-01-23 RX ADMIN — Medication 975 MILLIGRAM(S): at 22:49

## 2020-01-23 RX ADMIN — ALBUTEROL 2 PUFF(S): 90 AEROSOL, METERED ORAL at 12:04

## 2020-01-23 RX ADMIN — Medication 1 TABLET(S): at 11:27

## 2020-01-23 RX ADMIN — Medication 975 MILLIGRAM(S): at 14:38

## 2020-01-23 RX ADMIN — SIMVASTATIN 20 MILLIGRAM(S): 20 TABLET, FILM COATED ORAL at 21:49

## 2020-01-23 RX ADMIN — Medication 10 MILLIGRAM(S): at 05:18

## 2020-01-23 RX ADMIN — Medication 975 MILLIGRAM(S): at 05:18

## 2020-01-23 RX ADMIN — Medication 975 MILLIGRAM(S): at 06:18

## 2020-01-23 RX ADMIN — Medication 975 MILLIGRAM(S): at 13:38

## 2020-01-23 NOTE — PROGRESS NOTE ADULT - SUBJECTIVE AND OBJECTIVE BOX
Patient is seen and examined at bedside. Denies CP/SOB/Dizziness/N/V/D/HA. Pain is controlled. +flatus.     Vital Signs Last 24 Hrs  T(C): 36.9 (23 Jan 2020 07:57), Max: 37.2 (22 Jan 2020 12:41)  T(F): 98.4 (23 Jan 2020 07:57), Max: 98.9 (22 Jan 2020 12:41)  HR: 65 (23 Jan 2020 07:57) (54 - 70)  BP: 132/69 (23 Jan 2020 07:57) (109/54 - 132/69)  BP(mean): --  RR: 17 (23 Jan 2020 07:57) (15 - 18)  SpO2: 97% (23 Jan 2020 07:57) (95% - 99%)    GEN: NAD  ABD: soft, NT/ND; no rebound or guarding;  Neurologic: AAOx3; CNS grossly intact; no focal deficits  RLE: Hip: Anterior aspect, prineo dressing C/D/I. +purple colored ecchymosis.  Motor intact + EHL/FHL/TA/GS. Sensation is grossly intact.  Extremities warm. . Compartments soft, compressible. No calf tenderness. DP 2+.  LLE:  Motor intact + EHL/FHL/TA/GS. Sensation is grossly intact. Extremities warm. Compartments soft, compressible. No calf tenderness.. DP 2+.    Labs:                          11.6   12.68 )-----------( 172      ( 23 Jan 2020 06:10 )             37.1       01-23    143  |  109<H>  |  22  ----------------------------<  85  4.1   |  31  |  0.72    Ca    8.7      23 Jan 2020 06:10        A/P: Patient is a 74y y/o Female s/p right anterior hip arthroplasty, POD # 2  -wound care, isometric exercises, GI motility, new medications, hip precautions,  hospital course and discharge planning reviewed with pt  -Pain control/analgesia  -Inc spirometry reviewed and counseled  -Venodynes/foot pumps  -PT/OT/WBAT  -Anticoagulation: Xarelto  -Medical consult appreciated  -DC plan: rehab pending auth and bed availability.

## 2020-01-23 NOTE — PROGRESS NOTE ADULT - SUBJECTIVE AND OBJECTIVE BOX
MAYTE PEREIRA is a 74y Female s/p RIGHT ANTERIOR TOTAL HIP REPLACEMENT  RIGHT POSTERIOR TOTAL HIP REPLACEMENT      denies any chest pain shortness of breath palpitation dizziness lightheadedness nausea vomiting fever or chills    T(C): 36.9 (01-23-20 @ 07:57), Max: 37.2 (01-22-20 @ 12:41)  HR: 65 (01-23-20 @ 07:57) (54 - 70)  BP: 132/69 (01-23-20 @ 07:57) (109/54 - 132/69)  RR: 17 (01-23-20 @ 07:57) (15 - 18)  SpO2: 97% (01-23-20 @ 07:57) (95% - 99%)  no jvd/bruit  s1 s2 rrr  cta  s/nt/nd  no calf tend                          11.6   12.68 )-----------( 172      ( 23 Jan 2020 06:10 )             37.1   01-23    143  |  109<H>  |  22  ----------------------------<  85  4.1   |  31  |  0.72    Ca    8.7      23 Jan 2020 06:10    ^wbc post op fu op  cont dvt px  pain control  bowel regimen  antiemetics  incentive spirometer

## 2020-01-24 LAB
ANION GAP SERPL CALC-SCNC: 5 MMOL/L — SIGNIFICANT CHANGE UP (ref 5–17)
BUN SERPL-MCNC: 17 MG/DL — SIGNIFICANT CHANGE UP (ref 7–23)
CALCIUM SERPL-MCNC: 8.6 MG/DL — SIGNIFICANT CHANGE UP (ref 8.5–10.1)
CHLORIDE SERPL-SCNC: 109 MMOL/L — HIGH (ref 96–108)
CO2 SERPL-SCNC: 29 MMOL/L — SIGNIFICANT CHANGE UP (ref 22–31)
CREAT SERPL-MCNC: 0.64 MG/DL — SIGNIFICANT CHANGE UP (ref 0.5–1.3)
GLUCOSE SERPL-MCNC: 83 MG/DL — SIGNIFICANT CHANGE UP (ref 70–99)
HCT VFR BLD CALC: 37.9 % — SIGNIFICANT CHANGE UP (ref 34.5–45)
HGB BLD-MCNC: 11.9 G/DL — SIGNIFICANT CHANGE UP (ref 11.5–15.5)
MCHC RBC-ENTMCNC: 27.8 PG — SIGNIFICANT CHANGE UP (ref 27–34)
MCHC RBC-ENTMCNC: 31.4 GM/DL — LOW (ref 32–36)
MCV RBC AUTO: 88.6 FL — SIGNIFICANT CHANGE UP (ref 80–100)
NRBC # BLD: 0 /100 WBCS — SIGNIFICANT CHANGE UP (ref 0–0)
PLATELET # BLD AUTO: 174 K/UL — SIGNIFICANT CHANGE UP (ref 150–400)
POTASSIUM SERPL-MCNC: 3.9 MMOL/L — SIGNIFICANT CHANGE UP (ref 3.5–5.3)
POTASSIUM SERPL-SCNC: 3.9 MMOL/L — SIGNIFICANT CHANGE UP (ref 3.5–5.3)
RBC # BLD: 4.28 M/UL — SIGNIFICANT CHANGE UP (ref 3.8–5.2)
RBC # FLD: 15.4 % — HIGH (ref 10.3–14.5)
SODIUM SERPL-SCNC: 143 MMOL/L — SIGNIFICANT CHANGE UP (ref 135–145)
WBC # BLD: 12.96 K/UL — HIGH (ref 3.8–10.5)
WBC # FLD AUTO: 12.96 K/UL — HIGH (ref 3.8–10.5)

## 2020-01-24 RX ORDER — LACTULOSE 10 G/15ML
20 SOLUTION ORAL
Refills: 0 | Status: COMPLETED | OUTPATIENT
Start: 2020-01-24 | End: 2020-01-24

## 2020-01-24 RX ADMIN — MAGNESIUM HYDROXIDE 30 MILLILITER(S): 400 TABLET, CHEWABLE ORAL at 05:44

## 2020-01-24 RX ADMIN — Medication 975 MILLIGRAM(S): at 05:44

## 2020-01-24 RX ADMIN — PANTOPRAZOLE SODIUM 40 MILLIGRAM(S): 20 TABLET, DELAYED RELEASE ORAL at 07:42

## 2020-01-24 RX ADMIN — Medication 10 MILLIGRAM(S): at 05:44

## 2020-01-24 RX ADMIN — SIMVASTATIN 20 MILLIGRAM(S): 20 TABLET, FILM COATED ORAL at 21:21

## 2020-01-24 RX ADMIN — LACTULOSE 20 GRAM(S): 10 SOLUTION ORAL at 10:21

## 2020-01-24 RX ADMIN — RIVAROXABAN 10 MILLIGRAM(S): KIT at 11:45

## 2020-01-24 RX ADMIN — Medication 650 MILLIGRAM(S): at 22:20

## 2020-01-24 RX ADMIN — Medication 975 MILLIGRAM(S): at 14:46

## 2020-01-24 RX ADMIN — Medication 650 MILLIGRAM(S): at 21:21

## 2020-01-24 RX ADMIN — Medication 1 TABLET(S): at 11:45

## 2020-01-24 RX ADMIN — Medication 975 MILLIGRAM(S): at 13:46

## 2020-01-24 RX ADMIN — Medication 975 MILLIGRAM(S): at 06:44

## 2020-01-24 NOTE — PROGRESS NOTE ADULT - SUBJECTIVE AND OBJECTIVE BOX
MAYTE PEREIRA is a 74y Female s/p RIGHT ANTERIOR TOTAL HIP REPLACEMENT  RIGHT POSTERIOR TOTAL HIP REPLACEMENT      denies any chest pain shortness of breath palpitation dizziness lightheadedness nausea vomiting fever or chills    T(C): 36.6 (01-24-20 @ 05:41), Max: 36.6 (01-24-20 @ 05:41)  HR: 59 (01-24-20 @ 05:41) (50 - 71)  BP: 147/67 (01-24-20 @ 05:41) (128/72 - 147/67)  RR: 15 (01-24-20 @ 05:41) (15 - 16)  SpO2: 97% (01-24-20 @ 05:41) (97% - 98%)  no jvd/bruit  s1 s2 rrr  cta  s/nt/nd  no calf tend                        11.9   12.96 )-----------( 174      ( 24 Jan 2020 06:24 )             37.9   01-24    143  |  109<H>  |  17  ----------------------------<  83  3.9   |  29  |  0.64    Ca    8.6      24 Jan 2020 06:24        cont dvt px  pain control  bowel regimen  antiemetics  incentive spirometer

## 2020-01-24 NOTE — PROGRESS NOTE ADULT - SUBJECTIVE AND OBJECTIVE BOX
Patient is seen and examined at bedside. Denies CP/SOB/Dizziness/N/V/D/HA. Pain is controlled.     Vital Signs Last 24 Hrs  VSS    GEN: NAD  ABD: soft, NT/ND; no rebound or guarding;  Neurologic: AAOx3; CNS grossly intact; no focal deficits  RLE Prineo dressing C/D/I anterior hip. Motor intact + EHL/FHL/TA/GS. Sensation is grossly intact.  Extremities warm. . Compartments soft, compressible. No calf tenderness. DP 2+.  LLE:  Motor intact + EHL/FHL/TA/GS. Sensation is grossly intact. Extremities warm. Compartments soft, compressible. No calf tenderness.. DP 2+.    Labs:    H&H: 11.9/37.9          A/P: Patient is a 74y y/o Female s/p right anterior OBED, POD # 3  -wound care, isometric exercises, GI motility, new medications, hip precautions,  hospital course and discharge planning reviewed with pt  -Pain control/analgesia  -Inc spirometry reviewed and counseled: Poor competence  -Venodynes/foot pumps  -PT/OT/WBAT  -Anticoagulation: Xarelto  -Rehab pending auth: peer to peer pending today.   -Dr. El aware.

## 2020-01-25 ENCOUNTER — TRANSCRIPTION ENCOUNTER (OUTPATIENT)
Age: 75
End: 2020-01-25

## 2020-01-25 LAB
ANION GAP SERPL CALC-SCNC: 5 MMOL/L — SIGNIFICANT CHANGE UP (ref 5–17)
BUN SERPL-MCNC: 17 MG/DL — SIGNIFICANT CHANGE UP (ref 7–23)
CALCIUM SERPL-MCNC: 8.5 MG/DL — SIGNIFICANT CHANGE UP (ref 8.5–10.1)
CHLORIDE SERPL-SCNC: 108 MMOL/L — SIGNIFICANT CHANGE UP (ref 96–108)
CO2 SERPL-SCNC: 31 MMOL/L — SIGNIFICANT CHANGE UP (ref 22–31)
CREAT SERPL-MCNC: 0.77 MG/DL — SIGNIFICANT CHANGE UP (ref 0.5–1.3)
GLUCOSE SERPL-MCNC: 91 MG/DL — SIGNIFICANT CHANGE UP (ref 70–99)
HCT VFR BLD CALC: 39.3 % — SIGNIFICANT CHANGE UP (ref 34.5–45)
HGB BLD-MCNC: 12.2 G/DL — SIGNIFICANT CHANGE UP (ref 11.5–15.5)
MCHC RBC-ENTMCNC: 27.2 PG — SIGNIFICANT CHANGE UP (ref 27–34)
MCHC RBC-ENTMCNC: 31 GM/DL — LOW (ref 32–36)
MCV RBC AUTO: 87.7 FL — SIGNIFICANT CHANGE UP (ref 80–100)
NRBC # BLD: 0 /100 WBCS — SIGNIFICANT CHANGE UP (ref 0–0)
PLATELET # BLD AUTO: 198 K/UL — SIGNIFICANT CHANGE UP (ref 150–400)
POTASSIUM SERPL-MCNC: 4.2 MMOL/L — SIGNIFICANT CHANGE UP (ref 3.5–5.3)
POTASSIUM SERPL-SCNC: 4.2 MMOL/L — SIGNIFICANT CHANGE UP (ref 3.5–5.3)
RBC # BLD: 4.48 M/UL — SIGNIFICANT CHANGE UP (ref 3.8–5.2)
RBC # FLD: 15 % — HIGH (ref 10.3–14.5)
SODIUM SERPL-SCNC: 144 MMOL/L — SIGNIFICANT CHANGE UP (ref 135–145)
WBC # BLD: 10.92 K/UL — HIGH (ref 3.8–10.5)
WBC # FLD AUTO: 10.92 K/UL — HIGH (ref 3.8–10.5)

## 2020-01-25 RX ORDER — ACETAMINOPHEN 500 MG
650 TABLET ORAL EVERY 6 HOURS
Refills: 0 | Status: DISCONTINUED | OUTPATIENT
Start: 2020-01-25 | End: 2020-01-27

## 2020-01-25 RX ORDER — ACETAMINOPHEN 500 MG
1000 TABLET ORAL ONCE
Refills: 0 | Status: DISCONTINUED | OUTPATIENT
Start: 2020-01-25 | End: 2020-01-25

## 2020-01-25 RX ORDER — ACETAMINOPHEN 500 MG
650 TABLET ORAL ONCE
Refills: 0 | Status: COMPLETED | OUTPATIENT
Start: 2020-01-25 | End: 2020-01-25

## 2020-01-25 RX ADMIN — Medication 650 MILLIGRAM(S): at 19:00

## 2020-01-25 RX ADMIN — Medication 650 MILLIGRAM(S): at 19:45

## 2020-01-25 RX ADMIN — SIMVASTATIN 20 MILLIGRAM(S): 20 TABLET, FILM COATED ORAL at 21:15

## 2020-01-25 RX ADMIN — ALBUTEROL 2 PUFF(S): 90 AEROSOL, METERED ORAL at 10:29

## 2020-01-25 RX ADMIN — Medication 1 TABLET(S): at 11:58

## 2020-01-25 RX ADMIN — Medication 650 MILLIGRAM(S): at 07:39

## 2020-01-25 RX ADMIN — PANTOPRAZOLE SODIUM 40 MILLIGRAM(S): 20 TABLET, DELAYED RELEASE ORAL at 05:15

## 2020-01-25 RX ADMIN — RIVAROXABAN 10 MILLIGRAM(S): KIT at 13:59

## 2020-01-25 RX ADMIN — Medication 10 MILLIGRAM(S): at 05:15

## 2020-01-25 RX ADMIN — POLYETHYLENE GLYCOL 3350 17 GRAM(S): 17 POWDER, FOR SOLUTION ORAL at 11:56

## 2020-01-25 RX ADMIN — Medication 650 MILLIGRAM(S): at 07:13

## 2020-01-25 NOTE — PROGRESS NOTE ADULT - SUBJECTIVE AND OBJECTIVE BOX
MAYTE PEREIRA is a 74y Female s/p RIGHT ANTERIOR TOTAL HIP REPLACEMENT  RIGHT POSTERIOR TOTAL HIP REPLACEMENT      denies any chest pain shortness of breath palpitation dizziness lightheadedness nausea vomiting fever or chills    T(C): 36.9 (01-25-20 @ 17:05), Max: 36.9 (01-25-20 @ 17:05)  HR: 70 (01-25-20 @ 17:05) (60 - 78)  BP: 122/61 (01-25-20 @ 17:05) (103/55 - 135/69)  RR: 18 (01-25-20 @ 17:05) (15 - 22)  SpO2: 96% (01-25-20 @ 17:05) (94% - 97%)  no jvd/bruit  s1 s2 rrr  cta  s/nt/nd  no calf tend                        12.2   10.92 )-----------( 198      ( 25 Jan 2020 07:42 )             39.3   01-25    144  |  108  |  17  ----------------------------<  91  4.2   |  31  |  0.77    Ca    8.5      25 Jan 2020 07:42        cont dvt px  pain control  bowel regimen  antiemetics  incentive spirometer

## 2020-01-25 NOTE — DISCHARGE NOTE NURSING/CASE MANAGEMENT/SOCIAL WORK - PATIENT PORTAL LINK FT
You can access the FollowMyHealth Patient Portal offered by [facility] by registering at the following website: [email or web address]. By joining [employer]’s FollowMyHealth portal, you will also be able to view your health information using other applications (apps) compatible with our system.

## 2020-01-25 NOTE — DISCHARGE NOTE NURSING/CASE MANAGEMENT/SOCIAL WORK - NSDCFUADDAPPT_GEN_ALL_CORE_FT
Follow up with your surgeon in two weeks. Call for appointment.  If you need more pain medication, call your surgeon's office. For medication refills or authorizations, please call [phone] xt [phone]  We recommend that you call and schedule a follow up appointment with your primary care physician for repeat blood work (CBC and BMP) for post hospital discharge follow-up care.  Call your surgeon if you have increased redness/pain/drainage or fever. Return to ER for shortness of breath/calf tenderness.

## 2020-01-25 NOTE — DISCHARGE NOTE NURSING/CASE MANAGEMENT/SOCIAL WORK - NSDCPNINST_GEN_ALL_CORE
omen Take your medications exactly as prescribed. Having your pain under control will help increase activity, improve deep breathing and coughing and prevent complications like pneumonia and blood clots in your legs. Some of the common side effects of pain medications are nausea, vomiting, itching, rash and upset stomach. Contact your doctor if you develop these or any other unusual systoms. Eat a diet rich in fiber and drink plenty of oral fluids. Use other pain management methods like, cold and warm applications, elevation of affected body part, listening to music, watching TV, yoga, etc.Do not take any other medications unless approved by your doctor. Do not drive, operate machinery, drink alcohol, or make any important decisions while taking narcotic pain medications. Store medication in its original bottle, in a locked cabinet away from the reach of children. Dispose of any unused and  medication safely.

## 2020-01-25 NOTE — PROGRESS NOTE ADULT - SUBJECTIVE AND OBJECTIVE BOX
74yFemale s/p R OBED POD#4. Pt seen and examined in NAD. Pain controlled. Pt denies any new complaints. Pt denies CP/SOB/N/V/D/numbness/tingling/bowel or bladder dysfunction. (+0voids (+)flatus (+)tolerating PO diet    PE:   RLE: dressing c/d/i. +ROM ankle/toes. Calf: soft, compressible and nontender. DP/PT 2+ NVI.                           12.2   10.92 )-----------( 198      ( 25 Jan 2020 07:42 )             39.3       01-25    144  |  108  |  17  ----------------------------<  91  4.2   |  31  |  0.77    Ca    8.5      25 Jan 2020 07:42    A/P: 74yFemale s/p R OBED    Pain controlled  + Total hip precautions  PT: WBAT  DVT ppx: SCDs and Xarelto  Wound care, Isometric exercises, incentive spirometry   Discharge: planning home once cleared by PT. (pt was denied Auth for rehab after peer to peer)  All the above discussed and understood by pt

## 2020-01-25 NOTE — DISCHARGE NOTE NURSING/CASE MANAGEMENT/SOCIAL WORK - NSDCPNDISPN_GEN_ALL_CORE
Activities of daily living, including home environment that might     exacerbate pain or reduce effectiveness of the pain management plan of care as well as strategies to address these issues/Opioids not applicable/not prescribed/Safe use, storage and disposal of opioids when prescribed/Side effects of pain management treatment/Education provided on the pain management plan of care Safe use, storage and disposal of opioids when prescribed/Activities of daily living, including home environment that might     exacerbate pain or reduce effectiveness of the pain management plan of care as well as strategies to address these issues/Education provided on the pain management plan of care/Side effects of pain management treatment

## 2020-01-26 RX ORDER — TRAMADOL HYDROCHLORIDE 50 MG/1
50 TABLET ORAL EVERY 4 HOURS
Refills: 0 | Status: DISCONTINUED | OUTPATIENT
Start: 2020-01-26 | End: 2020-01-27

## 2020-01-26 RX ORDER — RIVAROXABAN 15 MG-20MG
1 KIT ORAL
Qty: 30 | Refills: 0
Start: 2020-01-26 | End: 2020-02-24

## 2020-01-26 RX ORDER — PANTOPRAZOLE SODIUM 20 MG/1
1 TABLET, DELAYED RELEASE ORAL
Qty: 30 | Refills: 0
Start: 2020-01-26 | End: 2020-02-24

## 2020-01-26 RX ORDER — OXYCODONE HYDROCHLORIDE 5 MG/1
1 TABLET ORAL
Qty: 30 | Refills: 0
Start: 2020-01-26 | End: 2020-01-30

## 2020-01-26 RX ADMIN — SIMVASTATIN 20 MILLIGRAM(S): 20 TABLET, FILM COATED ORAL at 22:21

## 2020-01-26 RX ADMIN — PANTOPRAZOLE SODIUM 40 MILLIGRAM(S): 20 TABLET, DELAYED RELEASE ORAL at 05:53

## 2020-01-26 RX ADMIN — TRAMADOL HYDROCHLORIDE 50 MILLIGRAM(S): 50 TABLET ORAL at 22:21

## 2020-01-26 RX ADMIN — TRAMADOL HYDROCHLORIDE 50 MILLIGRAM(S): 50 TABLET ORAL at 10:09

## 2020-01-26 RX ADMIN — RIVAROXABAN 10 MILLIGRAM(S): KIT at 12:12

## 2020-01-26 RX ADMIN — TRAMADOL HYDROCHLORIDE 50 MILLIGRAM(S): 50 TABLET ORAL at 23:21

## 2020-01-26 RX ADMIN — Medication 1 TABLET(S): at 12:12

## 2020-01-26 RX ADMIN — OXYCODONE HYDROCHLORIDE 5 MILLIGRAM(S): 5 TABLET ORAL at 04:40

## 2020-01-26 RX ADMIN — ALBUTEROL 2 PUFF(S): 90 AEROSOL, METERED ORAL at 19:21

## 2020-01-26 RX ADMIN — OXYCODONE HYDROCHLORIDE 5 MILLIGRAM(S): 5 TABLET ORAL at 03:44

## 2020-01-26 RX ADMIN — TRAMADOL HYDROCHLORIDE 50 MILLIGRAM(S): 50 TABLET ORAL at 11:07

## 2020-01-26 RX ADMIN — ALBUTEROL 2 PUFF(S): 90 AEROSOL, METERED ORAL at 10:10

## 2020-01-26 RX ADMIN — Medication 10 MILLIGRAM(S): at 05:53

## 2020-01-26 RX ADMIN — TRAMADOL HYDROCHLORIDE 50 MILLIGRAM(S): 50 TABLET ORAL at 18:02

## 2020-01-26 RX ADMIN — TRAMADOL HYDROCHLORIDE 50 MILLIGRAM(S): 50 TABLET ORAL at 17:18

## 2020-01-26 NOTE — PROGRESS NOTE ADULT - SUBJECTIVE AND OBJECTIVE BOX
MAYTE PEREIRA is a 74y Female s/p RIGHT ANTERIOR TOTAL HIP REPLACEMENT  RIGHT POSTERIOR TOTAL HIP REPLACEMENT      denies any chest pain shortness of breath palpitation dizziness lightheadedness nausea vomiting fever or chills    T(C): 36.6 (01-26-20 @ 05:54), Max: 36.9 (01-25-20 @ 17:05)  HR: 98 (01-26-20 @ 05:54) (68 - 98)  BP: 121/66 (01-26-20 @ 05:54) (103/55 - 129/53)  RR: 16 (01-26-20 @ 05:54) (16 - 22)  SpO2: 96% (01-26-20 @ 05:54) (94% - 98%)  no jvd/bruit  s1 s2 rrr  cta  s/nt/nd  no calf tend                          12.2   10.92 )-----------( 198      ( 25 Jan 2020 07:42 )             39.3   01-25    144  |  108  |  17  ----------------------------<  91  4.2   |  31  |  0.77    Ca    8.5      25 Jan 2020 07:42      cont dvt px  pain control  bowel regimen  antiemetics  incentive spirometer

## 2020-01-26 NOTE — PROGRESS NOTE ADULT - SUBJECTIVE AND OBJECTIVE BOX
Patient is seen and examined at bedside. Denies CP/SOB/Dizziness/N/V/D/HA. Feels dizzy and diaphoretic after oxycodone ingestion but says it helped with her pain. +small BM.     Vital Signs Last 24 Hrs  T(C): 36.6 (26 Jan 2020 05:54), Max: 36.9 (25 Jan 2020 17:05)  T(F): 97.8 (26 Jan 2020 05:54), Max: 98.5 (25 Jan 2020 17:05)  HR: 98 (26 Jan 2020 05:54) (68 - 98)  BP: 121/66 (26 Jan 2020 05:54) (103/55 - 129/53)  BP(mean): --  RR: 16 (26 Jan 2020 05:54) (16 - 22)  SpO2: 96% (26 Jan 2020 05:54) (94% - 98%)    GEN: NAD  ABD: soft, NT/ND; no rebound or guarding;  Neurologic: AAOx3; CNS grossly intact; no focal deficits  RLE: Anterior hip/groin: Prineo dressing C/D/I. Moderate edema at distal aspect of incision - soft, compressible. Motor intact + EHL/FHL/TA/GS. Sensation is grossly intact.  Extremities warm. . Compartments soft, compressible. No calf tenderness. DP 2+.  LLE:  Motor intact + EHL/FHL/TA/GS. Sensation is grossly intact. Extremities warm. Compartments soft, compressible. No calf tenderness.. DP 2+.    Labs: NNL       A/P: Patient is a 74y y/o Female s/p right anterior OBED, POD # 5  -wound care, isometric exercises, GI motility, new medications, hip precautions,  hospital course and discharge planning reviewed with pt  -Pain control/analgesia: Tramadol trial   -Deep breathing exercises reviewed (pt with poor competency demonstrated with incentive spirometer instruction and review)  -Venodynes/foot pumps  -PT/OT/WBAT  -Anticoagulation: Xarelto  -DC planning: Home when cleared by PT

## 2020-01-27 VITALS — OXYGEN SATURATION: 100 % | SYSTOLIC BLOOD PRESSURE: 139 MMHG | HEART RATE: 67 BPM | DIASTOLIC BLOOD PRESSURE: 54 MMHG

## 2020-01-27 RX ORDER — TRAMADOL HYDROCHLORIDE 50 MG/1
1 TABLET ORAL
Qty: 18 | Refills: 0
Start: 2020-01-27 | End: 2020-01-29

## 2020-01-27 RX ADMIN — Medication 650 MILLIGRAM(S): at 08:32

## 2020-01-27 RX ADMIN — PANTOPRAZOLE SODIUM 40 MILLIGRAM(S): 20 TABLET, DELAYED RELEASE ORAL at 05:42

## 2020-01-27 RX ADMIN — Medication 650 MILLIGRAM(S): at 09:32

## 2020-01-27 RX ADMIN — Medication 1 TABLET(S): at 11:52

## 2020-01-27 RX ADMIN — RIVAROXABAN 10 MILLIGRAM(S): KIT at 11:52

## 2020-01-27 RX ADMIN — Medication 10 MILLIGRAM(S): at 05:42

## 2020-01-27 NOTE — STUDENT SIGN OFF DOCUMENT - DOCUMENTS STUDENTS ARE SIGNED OFF ON
Assessment and Intervention/Vital Signs/Plan of Care
Vital Signs/Plan of Care/Assessment and Intervention
Assessment and Intervention/Vital Signs/Plan of Care

## 2020-01-27 NOTE — PROGRESS NOTE ADULT - SUBJECTIVE AND OBJECTIVE BOX
74F POD6 s/p R OBED  Patient seen and examined in recliner in NAD. Pain well controlled with tramadol yesterday. Denies any new complaints.  Tolerating diet. +BM.   Denies fever, chills, chest pain, sob, N/V.     PE:   Gen: Alert, oriented, NAD  CV: S1S2 RRR  Lungs: Respirations nonlabored  Abd: Soft, NTND  RLE: Anterior hip prineo dressing c/d/i, surrounding skin soft/NT/nonedematous/nonerythematous. Sensation grossly intact throughout. + EHL/FHL/TA/GS. Extremities warm. Compartments soft, compressible. No calf tenderness. +distal pulses  LLE:  Motor intact + EHL/FHL/TA/GS. Sensation is grossly intact. Extremities warm. Compartments soft, compressible. No calf tenderness                 A/P: 74F POD6 s/p right OBED  - PT, hip precautions  - pain management PRN  - bowel regimen  - incentive spirometer  - local wound care, continue prineo dressing  - SCDs, xarelto  - continue medicine f/u  - discharge planning; awaiting PT reeval for home

## 2020-01-27 NOTE — PROGRESS NOTE ADULT - SUBJECTIVE AND OBJECTIVE BOX
MAYTE PEREIRA is a 74y Female s/p RIGHT ANTERIOR TOTAL HIP REPLACEMENT  RIGHT POSTERIOR TOTAL HIP REPLACEMENT      denies any chest pain shortness of breath palpitation dizziness lightheadedness nausea vomiting fever or chills    T(C): 36.7 (01-27-20 @ 08:01), Max: 36.9 (01-26-20 @ 17:38)  HR: 72 (01-27-20 @ 09:30) (62 - 79)  BP: 112/40 (01-27-20 @ 09:30) (112/40 - 128/69)  RR: 16 (01-27-20 @ 08:01) (16 - 18)  SpO2: 95% (01-27-20 @ 09:30) (95% - 99%)  no jvd/bruit  s1 s2 rrr  cta  s/nt/nd  no calf tend      cont dvt px  pain control  bowel regimen  antiemetics  incentive spirometer

## 2020-01-31 DIAGNOSIS — M16.11 UNILATERAL PRIMARY OSTEOARTHRITIS, RIGHT HIP: ICD-10-CM

## 2020-01-31 DIAGNOSIS — J45.909 UNSPECIFIED ASTHMA, UNCOMPLICATED: ICD-10-CM

## 2020-01-31 DIAGNOSIS — F41.9 ANXIETY DISORDER, UNSPECIFIED: ICD-10-CM

## 2020-01-31 DIAGNOSIS — Z88.0 ALLERGY STATUS TO PENICILLIN: ICD-10-CM

## 2020-01-31 DIAGNOSIS — E78.2 MIXED HYPERLIPIDEMIA: ICD-10-CM

## 2020-01-31 DIAGNOSIS — Z88.2 ALLERGY STATUS TO SULFONAMIDES: ICD-10-CM

## 2020-01-31 DIAGNOSIS — Z88.6 ALLERGY STATUS TO ANALGESIC AGENT: ICD-10-CM

## 2020-12-14 NOTE — OCCUPATIONAL THERAPY INITIAL EVALUATION ADULT - SITTING BALANCE: STATIC
Patient Education        Eustachian Tube Problems: Care Instructions  Your Care Instructions     The eustachian (say \"you-STAY-shee-un\") tubes run between the inside of the ears and the throat. They keep air pressure stable in the ears. If your eustachian tubes become blocked, the air pressure in your ears changes. The fluids from a cold can clog eustachian tubes, causing pain in the ears. A quick change in air pressure can cause eustachian tubes to close up. This might happen when an airplane changes altitude or when a  goes up or down underwater. Eustachian tube problems often clear up on their own or after antibiotic treatment. If your tubes continue to be blocked, you may need surgery. Follow-up care is a key part of your treatment and safety. Be sure to make and go to all appointments, and call your doctor if you are having problems. It's also a good idea to know your test results and keep a list of the medicines you take. How can you care for yourself at home? · To ease ear pain, apply a warm washcloth or a heating pad set on low. There may be some drainage from the ear when the heat melts earwax. Put a cloth between the heat source and your skin. Do not use a heating pad with children. · If your doctor prescribed antibiotics, take them as directed. Do not stop taking them just because you feel better. You need to take the full course of antibiotics. · Your doctor may recommend over-the-counter medicine. Be safe with medicines. Oral or nasal decongestants may relieve ear pain. Avoid decongestants that are combined with antihistamines, which tend to cause more blockage. But if allergies seem to be the problem, your doctor may recommend a combination. Be careful with cough and cold medicines. Don't give them to children younger than 6, because they don't work for children that age and can even be harmful. For children 6 and older, always follow all the instructions carefully.  Make sure you know how much medicine to give and how long to use it. And use the dosing device if one is included. When should you call for help? Call your doctor now or seek immediate medical care if:    · You develop sudden, complete hearing loss.     · You have severe pain or feel dizzy.     · You have new or increasing pus or blood draining from your ear.     · You have redness, swelling, or pain around or behind the ear. Watch closely for changes in your health, and be sure to contact your doctor if:    · You do not get better after 2 weeks.     · You have any new symptoms, such as itching or a feeling of fullness in the ear. Where can you learn more? Go to [email or web address]://uSamppepiceweb.[email or web address]. org and sign in to your [employer] account. Enter Y822 in the [employer] box to learn more about \"Eustachian Tube Problems: Care Instructions. \"     If you do not have an account, please click on the \"Sign Up Now\" link. Current as of: April 15, 2020               Content Version: 12.6  © 3398-5230 [employer]. Care instructions adapted under license by [facility] ([facility]). If you have questions about a medical condition or this instruction, always ask your healthcare professional. [place] [address] any warranty or liability for your use of this information. Patient Education        Acne in Teens: Care Instructions  Overview  Acne is a skin problem. It shows up as blackheads, whiteheads, and pimples. Acne most often affects the face, neck, and upper body. It occurs when oil and dead skin cells clog the skin's pores. Acne usually starts during the teen years and often lasts into adulthood. Gentle cleansing every day controls most mild acne. If home treatment doesn't work, your doctor may prescribe a cream, an antibiotic, or a stronger medicine called isotretinoin. Sometimes birth control pills help women who have monthly acne flare-ups.   Follow-up care is a key part of your treatment and safety. Be sure to make and go to all appointments, and call your doctor if you are having problems. It's also a good idea to know your test results and keep a list of the medicines you take. How can you care for yourself at home? · Gently wash your face 1 or 2 times a day with warm (not hot) water and a mild soap or cleanser. Always rinse well. · Use an over-the-counter lotion or gel that contains benzoyl peroxide. Start with a small amount of 2.5% benzoyl peroxide and increase the strength as needed. Benzoyl peroxide works well for acne, but you may need to use it for up to 2 months before your acne starts to improve. · Apply acne cream, lotion, or gel to all the places you get pimples, blackheads, or whiteheads, not just where you have them now. Follow the instructions carefully. If your skin gets too dry and scaly or red and sore, reduce the amount. For the best results, apply medicines as directed. Try not to miss doses. · Do not squeeze or pick pimples and blackheads. This can cause infection and scarring. · Use only oil-free makeup, sunscreen, and other skin care products that will not clog your pores. · Wash your hair every day, and try to keep it off your face and shoulders. Consider pinning it back or cutting it short. When should you call for help? Watch closely for changes in your health, and be sure to contact your doctor if:    · You have tried home treatment for 6 to 8 weeks and your acne is not better or gets worse. Your doctor may need to add to or change your treatment.     · Your pimples become large and hard or filled with fluid.     · Scars form after pimples heal.     · You feel sad or hopeless, lack energy, or have other signs of depression while you are taking the prescription medicine isotretinoin.     · You start to have other symptoms, such as facial hair growth in women or bone and muscle pain. Where can you learn more? Go to https://mg.health-partners. org and sign in to your [employer] account. Enter N668 in the ExpertFlyer box to learn more about \"Acne in Teens: Care Instructions. \"     If you do not have an account, please click on the \"Sign Up Now\" link. Current as of: July 2, 2020               Content Version: 12.6  © 5004-5173 [employer]. Care instructions adapted under license by [facility] ([facility]). If you have questions about a medical condition or this instruction, always ask your healthcare professional. [address] any warranty or liability for your use of this information. normal balance

## 2021-08-12 ENCOUNTER — RX RENEWAL (OUTPATIENT)
Age: 76
End: 2021-08-12

## 2021-08-26 ENCOUNTER — RX RENEWAL (OUTPATIENT)
Age: 76
End: 2021-08-26

## 2021-09-21 DIAGNOSIS — R19.7 DIARRHEA, UNSPECIFIED: ICD-10-CM

## 2021-09-21 DIAGNOSIS — L40.9 PSORIASIS, UNSPECIFIED: ICD-10-CM

## 2021-09-21 DIAGNOSIS — K57.90 DIVERTICULOSIS OF INTESTINE, PART UNSPECIFIED, W/OUT PERFORATION OR ABSCESS W/OUT BLEEDING: ICD-10-CM

## 2021-09-21 DIAGNOSIS — M25.551 PAIN IN RIGHT HIP: ICD-10-CM

## 2021-09-21 DIAGNOSIS — M19.90 UNSPECIFIED OSTEOARTHRITIS, UNSPECIFIED SITE: ICD-10-CM

## 2021-09-21 DIAGNOSIS — K92.1 MELENA: ICD-10-CM

## 2021-09-21 DIAGNOSIS — R63.4 ABNORMAL WEIGHT LOSS: ICD-10-CM

## 2021-09-21 DIAGNOSIS — R11.2 NAUSEA WITH VOMITING, UNSPECIFIED: ICD-10-CM

## 2021-09-21 DIAGNOSIS — M54.9 DORSALGIA, UNSPECIFIED: ICD-10-CM

## 2021-09-21 RX ORDER — PANTOPRAZOLE 40 MG/1
40 TABLET, DELAYED RELEASE ORAL
Refills: 0 | Status: ACTIVE | COMMUNITY

## 2021-09-21 RX ORDER — RIVAROXABAN 10 MG/1
10 TABLET, FILM COATED ORAL
Refills: 0 | Status: ACTIVE | COMMUNITY

## 2021-09-21 RX ORDER — BUDESONIDE AND FORMOTEROL FUMARATE DIHYDRATE 160; 4.5 UG/1; UG/1
160-4.5 AEROSOL RESPIRATORY (INHALATION)
Refills: 0 | Status: ACTIVE | COMMUNITY

## 2021-09-22 ENCOUNTER — APPOINTMENT (OUTPATIENT)
Dept: INTERNAL MEDICINE | Facility: CLINIC | Age: 76
End: 2021-09-22
Payer: MEDICARE

## 2021-09-22 VITALS
OXYGEN SATURATION: 97 % | DIASTOLIC BLOOD PRESSURE: 80 MMHG | SYSTOLIC BLOOD PRESSURE: 134 MMHG | HEART RATE: 76 BPM | TEMPERATURE: 97.4 F | RESPIRATION RATE: 17 BRPM | BODY MASS INDEX: 24.55 KG/M2 | WEIGHT: 130 LBS | HEIGHT: 61 IN

## 2021-09-22 DIAGNOSIS — Z23 ENCOUNTER FOR IMMUNIZATION: ICD-10-CM

## 2021-09-22 DIAGNOSIS — Z71.89 OTHER SPECIFIED COUNSELING: ICD-10-CM

## 2021-09-22 PROCEDURE — 90715 TDAP VACCINE 7 YRS/> IM: CPT

## 2021-09-22 PROCEDURE — 99213 OFFICE O/P EST LOW 20 MIN: CPT | Mod: 25

## 2021-09-22 PROCEDURE — 90471 IMMUNIZATION ADMIN: CPT

## 2021-09-28 PROBLEM — Z23 NEED FOR TDAP VACCINATION: Status: ACTIVE | Noted: 2021-09-28

## 2021-09-28 PROBLEM — Z71.89 IMMUNIZATION COUNSELING: Status: ACTIVE | Noted: 2021-09-28

## 2021-09-28 NOTE — HISTORY OF PRESENT ILLNESS
[FreeTextEntry1] : Followup [de-identified] :  77 y/o female presents for follow up and is requesting Tdap vaccine. Patient is overdue for booster. She feels otherwise well and offers no other acute complaints or concerns.

## 2021-10-25 ENCOUNTER — INPATIENT (INPATIENT)
Facility: HOSPITAL | Age: 76
LOS: 8 days | Discharge: NURSING, REHAB OR LONG-TERM CARE | End: 2021-11-03
Attending: INTERNAL MEDICINE | Admitting: INTERNAL MEDICINE
Payer: MEDICARE

## 2021-10-25 VITALS
OXYGEN SATURATION: 95 % | WEIGHT: 134.92 LBS | RESPIRATION RATE: 19 BRPM | TEMPERATURE: 98 F | DIASTOLIC BLOOD PRESSURE: 71 MMHG | HEART RATE: 145 BPM | SYSTOLIC BLOOD PRESSURE: 101 MMHG | HEIGHT: 62 IN

## 2021-10-25 DIAGNOSIS — Z98.890 OTHER SPECIFIED POSTPROCEDURAL STATES: Chronic | ICD-10-CM

## 2021-10-25 LAB
ALBUMIN SERPL ELPH-MCNC: 3 G/DL — LOW (ref 3.3–5)
ALP SERPL-CCNC: 76 U/L — SIGNIFICANT CHANGE UP (ref 40–120)
ALT FLD-CCNC: 13 U/L — SIGNIFICANT CHANGE UP (ref 12–78)
ANION GAP SERPL CALC-SCNC: 7 MMOL/L — SIGNIFICANT CHANGE UP (ref 5–17)
APPEARANCE UR: CLEAR — SIGNIFICANT CHANGE UP
APTT BLD: 32.3 SEC — SIGNIFICANT CHANGE UP (ref 27.5–35.5)
AST SERPL-CCNC: 30 U/L — SIGNIFICANT CHANGE UP (ref 15–37)
BACTERIA # UR AUTO: ABNORMAL
BASOPHILS # BLD AUTO: 0.03 K/UL — SIGNIFICANT CHANGE UP (ref 0–0.2)
BASOPHILS NFR BLD AUTO: 0.3 % — SIGNIFICANT CHANGE UP (ref 0–2)
BILIRUB SERPL-MCNC: 0.5 MG/DL — SIGNIFICANT CHANGE UP (ref 0.2–1.2)
BILIRUB UR-MCNC: NEGATIVE — SIGNIFICANT CHANGE UP
BUN SERPL-MCNC: 25 MG/DL — HIGH (ref 7–23)
CALCIUM SERPL-MCNC: 8.8 MG/DL — SIGNIFICANT CHANGE UP (ref 8.5–10.1)
CHLORIDE SERPL-SCNC: 108 MMOL/L — SIGNIFICANT CHANGE UP (ref 96–108)
CK MB BLD-MCNC: 3 % — SIGNIFICANT CHANGE UP (ref 0–3.5)
CK MB CFR SERPL CALC: 3.7 NG/ML — HIGH (ref 0.5–3.6)
CK SERPL-CCNC: 122 U/L — SIGNIFICANT CHANGE UP (ref 26–192)
CO2 SERPL-SCNC: 24 MMOL/L — SIGNIFICANT CHANGE UP (ref 22–31)
COLOR SPEC: YELLOW — SIGNIFICANT CHANGE UP
CREAT SERPL-MCNC: 1 MG/DL — SIGNIFICANT CHANGE UP (ref 0.5–1.3)
DIFF PNL FLD: ABNORMAL
EOSINOPHIL # BLD AUTO: 0.12 K/UL — SIGNIFICANT CHANGE UP (ref 0–0.5)
EOSINOPHIL NFR BLD AUTO: 1 % — SIGNIFICANT CHANGE UP (ref 0–6)
EPI CELLS # UR: SIGNIFICANT CHANGE UP
FLUAV AG NPH QL: SIGNIFICANT CHANGE UP
FLUBV AG NPH QL: SIGNIFICANT CHANGE UP
GLUCOSE SERPL-MCNC: 112 MG/DL — HIGH (ref 70–99)
GLUCOSE UR QL: NEGATIVE MG/DL — SIGNIFICANT CHANGE UP
HCT VFR BLD CALC: 49.5 % — HIGH (ref 34.5–45)
HGB BLD-MCNC: 16.1 G/DL — HIGH (ref 11.5–15.5)
IMM GRANULOCYTES NFR BLD AUTO: 0.5 % — SIGNIFICANT CHANGE UP (ref 0–1.5)
INR BLD: 1.13 RATIO — SIGNIFICANT CHANGE UP (ref 0.88–1.16)
KETONES UR-MCNC: ABNORMAL
LACTATE SERPL-SCNC: 1.9 MMOL/L — SIGNIFICANT CHANGE UP (ref 0.7–2)
LEUKOCYTE ESTERASE UR-ACNC: NEGATIVE — SIGNIFICANT CHANGE UP
LYMPHOCYTES # BLD AUTO: 0.28 K/UL — LOW (ref 1–3.3)
LYMPHOCYTES # BLD AUTO: 2.3 % — LOW (ref 13–44)
MCHC RBC-ENTMCNC: 27.1 PG — SIGNIFICANT CHANGE UP (ref 27–34)
MCHC RBC-ENTMCNC: 32.5 GM/DL — SIGNIFICANT CHANGE UP (ref 32–36)
MCV RBC AUTO: 83.3 FL — SIGNIFICANT CHANGE UP (ref 80–100)
MONOCYTES # BLD AUTO: 0.92 K/UL — HIGH (ref 0–0.9)
MONOCYTES NFR BLD AUTO: 7.7 % — SIGNIFICANT CHANGE UP (ref 2–14)
NEUTROPHILS # BLD AUTO: 10.53 K/UL — HIGH (ref 1.8–7.4)
NEUTROPHILS NFR BLD AUTO: 88.2 % — HIGH (ref 43–77)
NITRITE UR-MCNC: NEGATIVE — SIGNIFICANT CHANGE UP
NRBC # BLD: 0 /100 WBCS — SIGNIFICANT CHANGE UP (ref 0–0)
PH UR: 5 — SIGNIFICANT CHANGE UP (ref 5–8)
PLATELET # BLD AUTO: 206 K/UL — SIGNIFICANT CHANGE UP (ref 150–400)
POTASSIUM SERPL-MCNC: 4.3 MMOL/L — SIGNIFICANT CHANGE UP (ref 3.5–5.3)
POTASSIUM SERPL-SCNC: 4.3 MMOL/L — SIGNIFICANT CHANGE UP (ref 3.5–5.3)
PROT SERPL-MCNC: 6.3 GM/DL — SIGNIFICANT CHANGE UP (ref 6–8.3)
PROT UR-MCNC: 15 MG/DL
PROTHROM AB SERPL-ACNC: 13 SEC — SIGNIFICANT CHANGE UP (ref 10.6–13.6)
RBC # BLD: 5.94 M/UL — HIGH (ref 3.8–5.2)
RBC # FLD: 14.7 % — HIGH (ref 10.3–14.5)
RBC CASTS # UR COMP ASSIST: SIGNIFICANT CHANGE UP /HPF (ref 0–4)
SARS-COV-2 RNA SPEC QL NAA+PROBE: SIGNIFICANT CHANGE UP
SODIUM SERPL-SCNC: 139 MMOL/L — SIGNIFICANT CHANGE UP (ref 135–145)
SP GR SPEC: 1.02 — SIGNIFICANT CHANGE UP (ref 1.01–1.02)
TROPONIN I SERPL-MCNC: 0.45 NG/ML — HIGH (ref 0.01–0.04)
UROBILINOGEN FLD QL: NEGATIVE MG/DL — SIGNIFICANT CHANGE UP
WBC # BLD: 11.94 K/UL — HIGH (ref 3.8–10.5)
WBC # FLD AUTO: 11.94 K/UL — HIGH (ref 3.8–10.5)
WBC UR QL: SIGNIFICANT CHANGE UP

## 2021-10-25 PROCEDURE — 71045 X-RAY EXAM CHEST 1 VIEW: CPT | Mod: 26

## 2021-10-25 PROCEDURE — 93010 ELECTROCARDIOGRAM REPORT: CPT

## 2021-10-25 PROCEDURE — 99285 EMERGENCY DEPT VISIT HI MDM: CPT

## 2021-10-25 PROCEDURE — 99223 1ST HOSP IP/OBS HIGH 75: CPT

## 2021-10-25 PROCEDURE — 99222 1ST HOSP IP/OBS MODERATE 55: CPT

## 2021-10-25 PROCEDURE — 71275 CT ANGIOGRAPHY CHEST: CPT | Mod: 26

## 2021-10-25 PROCEDURE — 74174 CTA ABD&PLVS W/CONTRAST: CPT | Mod: 26

## 2021-10-25 RX ORDER — DILTIAZEM HCL 120 MG
5 CAPSULE, EXT RELEASE 24 HR ORAL ONCE
Refills: 0 | Status: COMPLETED | OUTPATIENT
Start: 2021-10-25 | End: 2021-10-25

## 2021-10-25 RX ORDER — DILTIAZEM HCL 120 MG
15 CAPSULE, EXT RELEASE 24 HR ORAL ONCE
Refills: 0 | Status: COMPLETED | OUTPATIENT
Start: 2021-10-25 | End: 2021-10-25

## 2021-10-25 RX ORDER — SODIUM CHLORIDE 9 MG/ML
500 INJECTION INTRAMUSCULAR; INTRAVENOUS; SUBCUTANEOUS ONCE
Refills: 0 | Status: COMPLETED | OUTPATIENT
Start: 2021-10-25 | End: 2021-10-25

## 2021-10-25 RX ORDER — AMIODARONE HYDROCHLORIDE 400 MG/1
150 TABLET ORAL ONCE
Refills: 0 | Status: COMPLETED | OUTPATIENT
Start: 2021-10-25 | End: 2021-10-25

## 2021-10-25 RX ORDER — CEFTRIAXONE 500 MG/1
1000 INJECTION, POWDER, FOR SOLUTION INTRAMUSCULAR; INTRAVENOUS EVERY 24 HOURS
Refills: 0 | Status: DISCONTINUED | OUTPATIENT
Start: 2021-10-25 | End: 2021-10-26

## 2021-10-25 RX ORDER — DIGOXIN 250 MCG
125 TABLET ORAL DAILY
Refills: 0 | Status: DISCONTINUED | OUTPATIENT
Start: 2021-10-25 | End: 2021-10-25

## 2021-10-25 RX ORDER — SODIUM CHLORIDE 9 MG/ML
1000 INJECTION INTRAMUSCULAR; INTRAVENOUS; SUBCUTANEOUS
Refills: 0 | Status: DISCONTINUED | OUTPATIENT
Start: 2021-10-25 | End: 2021-10-26

## 2021-10-25 RX ORDER — SODIUM CHLORIDE 9 MG/ML
1000 INJECTION INTRAMUSCULAR; INTRAVENOUS; SUBCUTANEOUS ONCE
Refills: 0 | Status: COMPLETED | OUTPATIENT
Start: 2021-10-25 | End: 2021-10-25

## 2021-10-25 RX ORDER — LIDOCAINE 4 G/100G
1 CREAM TOPICAL ONCE
Refills: 0 | Status: COMPLETED | OUTPATIENT
Start: 2021-10-25 | End: 2021-10-25

## 2021-10-25 RX ORDER — ACETAMINOPHEN 500 MG
975 TABLET ORAL ONCE
Refills: 0 | Status: COMPLETED | OUTPATIENT
Start: 2021-10-25 | End: 2021-10-25

## 2021-10-25 RX ORDER — CHLORHEXIDINE GLUCONATE 213 G/1000ML
1 SOLUTION TOPICAL
Refills: 0 | Status: DISCONTINUED | OUTPATIENT
Start: 2021-10-25 | End: 2021-10-28

## 2021-10-25 RX ORDER — AMIODARONE HYDROCHLORIDE 400 MG/1
1 TABLET ORAL
Qty: 900 | Refills: 0 | Status: DISCONTINUED | OUTPATIENT
Start: 2021-10-25 | End: 2021-10-27

## 2021-10-25 RX ORDER — INFLUENZA VIRUS VACCINE 15; 15; 15; 15 UG/.5ML; UG/.5ML; UG/.5ML; UG/.5ML
0.5 SUSPENSION INTRAMUSCULAR ONCE
Refills: 0 | Status: COMPLETED | OUTPATIENT
Start: 2021-10-25 | End: 2021-10-25

## 2021-10-25 RX ORDER — DILTIAZEM HCL 120 MG
30 CAPSULE, EXT RELEASE 24 HR ORAL ONCE
Refills: 0 | Status: COMPLETED | OUTPATIENT
Start: 2021-10-25 | End: 2021-10-25

## 2021-10-25 RX ORDER — DIAZEPAM 5 MG
2 TABLET ORAL DAILY
Refills: 0 | Status: DISCONTINUED | OUTPATIENT
Start: 2021-10-25 | End: 2021-10-25

## 2021-10-25 RX ORDER — HYDROCORTISONE 20 MG
50 TABLET ORAL EVERY 8 HOURS
Refills: 0 | Status: DISCONTINUED | OUTPATIENT
Start: 2021-10-25 | End: 2021-10-26

## 2021-10-25 RX ORDER — APIXABAN 2.5 MG/1
5 TABLET, FILM COATED ORAL
Refills: 0 | Status: DISCONTINUED | OUTPATIENT
Start: 2021-10-25 | End: 2021-10-26

## 2021-10-25 RX ORDER — SIMVASTATIN 20 MG/1
20 TABLET, FILM COATED ORAL AT BEDTIME
Refills: 0 | Status: DISCONTINUED | OUTPATIENT
Start: 2021-10-25 | End: 2021-11-03

## 2021-10-25 RX ORDER — AMIODARONE HYDROCHLORIDE 400 MG/1
0.5 TABLET ORAL
Qty: 900 | Refills: 0 | Status: DISCONTINUED | OUTPATIENT
Start: 2021-10-26 | End: 2021-10-27

## 2021-10-25 RX ADMIN — SODIUM CHLORIDE 1000 MILLILITER(S): 9 INJECTION INTRAMUSCULAR; INTRAVENOUS; SUBCUTANEOUS at 20:21

## 2021-10-25 RX ADMIN — Medication 125 MICROGRAM(S): at 16:19

## 2021-10-25 RX ADMIN — AMIODARONE HYDROCHLORIDE 33.3 MG/MIN: 400 TABLET ORAL at 19:00

## 2021-10-25 RX ADMIN — SODIUM CHLORIDE 1000 MILLILITER(S): 9 INJECTION INTRAMUSCULAR; INTRAVENOUS; SUBCUTANEOUS at 15:52

## 2021-10-25 RX ADMIN — SODIUM CHLORIDE 1000 MILLILITER(S): 9 INJECTION INTRAMUSCULAR; INTRAVENOUS; SUBCUTANEOUS at 15:17

## 2021-10-25 RX ADMIN — Medication 975 MILLIGRAM(S): at 15:17

## 2021-10-25 RX ADMIN — SODIUM CHLORIDE 1000 MILLILITER(S): 9 INJECTION INTRAMUSCULAR; INTRAVENOUS; SUBCUTANEOUS at 14:33

## 2021-10-25 RX ADMIN — Medication 15 MILLIGRAM(S): at 19:59

## 2021-10-25 RX ADMIN — Medication 5 MILLIGRAM(S): at 15:17

## 2021-10-25 RX ADMIN — Medication 975 MILLIGRAM(S): at 14:50

## 2021-10-25 RX ADMIN — SODIUM CHLORIDE 75 MILLILITER(S): 9 INJECTION INTRAMUSCULAR; INTRAVENOUS; SUBCUTANEOUS at 22:48

## 2021-10-25 RX ADMIN — SODIUM CHLORIDE 1000 MILLILITER(S): 9 INJECTION INTRAMUSCULAR; INTRAVENOUS; SUBCUTANEOUS at 14:50

## 2021-10-25 RX ADMIN — SODIUM CHLORIDE 1000 MILLILITER(S): 9 INJECTION INTRAMUSCULAR; INTRAVENOUS; SUBCUTANEOUS at 15:51

## 2021-10-25 RX ADMIN — Medication 15 MILLIGRAM(S): at 18:52

## 2021-10-25 RX ADMIN — CEFTRIAXONE 100 MILLIGRAM(S): 500 INJECTION, POWDER, FOR SOLUTION INTRAMUSCULAR; INTRAVENOUS at 22:48

## 2021-10-25 RX ADMIN — Medication 5 MILLIGRAM(S): at 14:50

## 2021-10-25 RX ADMIN — AMIODARONE HYDROCHLORIDE 618 MILLIGRAM(S): 400 TABLET ORAL at 16:19

## 2021-10-25 RX ADMIN — Medication 50 MILLIGRAM(S): at 21:15

## 2021-10-25 RX ADMIN — LIDOCAINE 1 PATCH: 4 CREAM TOPICAL at 20:47

## 2021-10-25 RX ADMIN — SIMVASTATIN 20 MILLIGRAM(S): 20 TABLET, FILM COATED ORAL at 21:15

## 2021-10-25 RX ADMIN — AMIODARONE HYDROCHLORIDE 150 MILLIGRAM(S): 400 TABLET ORAL at 16:41

## 2021-10-25 RX ADMIN — Medication 30 MILLIGRAM(S): at 20:05

## 2021-10-25 RX ADMIN — SODIUM CHLORIDE 500 MILLILITER(S): 9 INJECTION INTRAMUSCULAR; INTRAVENOUS; SUBCUTANEOUS at 16:50

## 2021-10-25 NOTE — ED PROVIDER NOTE - CONSTITUTIONAL, MLM
Well appearing, awake, alert, oriented to person, place, time/situation and in no apparent distress. 102.2 temp rectal temp. normal...

## 2021-10-25 NOTE — CHART NOTE - NSCHARTNOTEFT_GEN_A_CORE
Called by Dr. Ferraro to help manage this 75 yo female who was admitted with severe/chronic LBP and elevated temps (>102) and new onset AF with hypotension.  Would consider the patient in septic shock with her current toxic metabolic state inducing rapid AF. She has been started on Amio drip because of her low BP, but should be admitted to ICU bed for care and management. Dr. Leach to d/w ICU team regarding admission to an intensive care bed. Would attempt aggressive fluid resuscitation if no evidence of pulmonary edema; get TTE tonight to assess LVEF and rule out cardiogenic causes.

## 2021-10-25 NOTE — CONSULT NOTE ADULT - ASSESSMENT
Pt is a 75 yo WF with h/o asthma, AAA repaired x2 years ago,  HLD, OA, and , anxiety  BIBEMS 2 to lower back pain x2-3 weeks. Pt seen at [facility] yesterday for similar complaints, had negative workup and was discharged. States her back pain worsened yesterday while laying on the table for her CT scan. States she has had back pain in the past but this is worse. Reports pain is to the point where she cannot get herself out of bed. In the ER pt found to be febrile 102.2 and in A fib with RVR with borderline low BP but not requiring pressors. Pt Rx'd with IVF, IV and Po Cardizem, IV Amio and then loaded with IV Amio. ICU dx: 1) Demand ischemia  2 to A fib with RVR 2) Febrile but source of infection not clear (not urine or lungs) 3) Lower back pain    Resp: Supplemental O2 prn to maintain O2 sat >92%// F/u results of CT chest  ID: F/u Cx/ May cont empiric Ceftriaxone  CVS: Cont Amio load for A fib with RVR/ Trend Trop and check EKG  HEME: Start AC  FEN: NPO for this evening/ IVF  Endo: Follow Glu  GI: F/u results of CT abd/plevis  Renal: follow BUN/Cr and UO  ? Ortho consult regarding LBP

## 2021-10-25 NOTE — ED PROVIDER NOTE - OBJECTIVE STATEMENT
76 year old female w/PMH of AAA repaired x2 years ago, HLD, OA, asthma, anxiety [facility]. [facility] for similar complaints, had negative workup and was discharged. States her back pain worsened yesterday while laying on the table for her CT scan. States she has had back pain in the past but this is worse. Reports pain is to the point where she cannot get herself out of bed. Denies fever/chills, cough, SOB, CP, abdominal pain, paresthesia or N/V/D. Pt is vaccinated against COVID w/Pfizer.

## 2021-10-25 NOTE — H&P ADULT - HISTORY OF PRESENT ILLNESS
76 year old female        w/PMH of AAA repaired x2 years ago,  HLD, OA, asthma, anxiety         presents to the ED [facility] for lower back pain x2  3 weeks.   Pt seen at [facility] yesterday for similar complaints, had negative workup, including cr scan / report unavailable  now,  and was discharged.    States her back pain worsened yesterday while laying on the table for her CT scan.    States she has had back pain in the past. . Reports pain is to the point where she cannot get herself out of bed.    Denies fever/chills, cough, SOB, CP, abdominal pain, paresthesia or N/V/D.    Pt is vaccinated against COVID w/Pfizer.  in er, pt with new  rapid AFib, with  rvr  Seen by icu and rejected  on iv amiodarone  drip , in  er  76 year old female        w/PMH of AAA repaired x2 years ago,  HLD, OA, asthma, anxiety         [facility]   Pt seen at [facility] yesterday for similar complaints, had negative workup, including cr scan / report unavailable  now,  and was discharged. home  yesterday    States her back pain worsened yesterday while laying on the table for her CT scan.   now  with worsening  pain,  and  has   difficulty in betting out if bed   denies  leg weakness    Denies fever/chills, cough, SOB, CP, abdominal pain, paresthesia or N/V/D.    Pt is vaccinated against COVID w/Pfizer.  In  er, pt with new  rapid AFib, with  RVR  Seen by icu and rejected  on iv amiodarone  drip ,by Er

## 2021-10-25 NOTE — H&P ADULT - ASSESSMENT
76 year old female        w/PMH of AAA repaired x2 years ago,  HLD, OA, asthma, anxiety         [facility]   Pt seen at [facility] yesterday for similar complaints, had negative workup, including cr scan / report unavailable  now,  and was discharged.    States her back pain worsened yesterday while laying on the table for her CT scan.    States she has had back pain in the past. . Reports pain is to the point where she cannot get herself out of bed.    Denies fever/chills, cough, SOB, CP, abdominal pain, paresthesia or N/V/D.    Pt is vaccinated against COVID w/Pfizer.  in er, pt with new  rapid AFib, with  rvr  Seen by icu and rejected  on iv amiodarone  drip , in  er     * Afib, new   on iv amiodarone  drip  on a/c /  Eliquis  bid, started   card dr palomino   *  +  Troponin,  not  from  MI    it is  from demand ischemia  *  HLD,   on  zocor  * Fevers,   source  unclear    cxr, normal    follow blood/ urine   c/s   on empiric iv rocephin  8 Echo  ordered  tele  monitoring    labs/ tsh in am   76 year old female        w/PMH of AAA repaired x2 years ago,  HLD, OA, asthma, anxiety         [facility]   [facility], had negative workup, including cr scan / report unavailable  now,  and was discharged.    States her back pain worsened yesterday while laying on the table for her CT scan.    States she has had back pain in the past. . Reports pain is to the point where she cannot get herself out of bed.    Denies fever/chills, cough, SOB, CP, abdominal pain, paresthesia or N/V/D.    Pt is vaccinated against COVID w/Pfizer.  in er, pt with new  rapid AFib, with  rvr  Seen by icu and rejected  on iv amiodarone  drip , in  er     * Afib, new, with  low  sbp   on iv amiodarone  drip  on a/c /  Eliquis  bid, started   card dr palomino   *  +  Troponin,  not  from  MI    it is  from demand ischemia  *  HLD,   on  zocor  * Fevers,   source  unclear  ha s back pain for  weeks/ mri spine ordered    cxr, normal    follow blood/ urine   c/s   on empiric iv rocephin  * Br  asthma    on daily  5 mg prednisone   for years/ / now   with  hypotension , on iv stress  dose  steroids  * Echo  ordered  tele  monitoring    labs/ tsh in am     addendum/  8.30  pm   pt with hypotension / hr in 140/s/ / icu  recalled  pt needs  icu level care   76 year old female        w/PMH of AAA repaired x2 years ago,  HLD, OA, asthma, anxiety         [facility]   Pt seen at [facility] for similar complaints, had negative workup, including cr scan / report unavailable  now,  and was discharged.    States her back pain worsened yesterday while laying on the table for her CT scan.    States she has had back pain in the past. . Reports pain is to the point where she cannot get herself out of bed.    Denies fever/chills, cough, SOB, CP, abdominal pain, paresthesia or N/V/D.    Pt is vaccinated against COVID w/Pfizer.  in er, pt with new  rapid AFib, with  rvr  Seen by icu and rejected  on iv amiodarone  drip , in  er/ given total of 70 mg cardizem in er  and iv  fluids   3.5  L     * Afib, new, with  low  sbp   on iv amiodarone  drip  on a/c /  Eliquis  bid, started   card dr palomino   *  +  Troponin,  not  from  MI    it is  from demand ischemia  *  HLD,   on  zocor  * Fevers,   source  unclear  ha s back pain for  weeks/ mri spine ordered    cxr, normal    follow blood/ urine   c/s   on empiric iv rocephin  * Br  asthma    on daily  5 mg prednisone   for years/ / now   with  hypotension , on iv stress  dose  steroids  * Echo  ordered  tele  monitoring    labs/ tsh in am  son at bedside     addendum/  8.30  pm   pt with hypotension / hr in 140/s/ / icu  recalled  pt needs  icu level care   76 year old female        w/PMH of AAA repaired x2 years ago,  HLD, OA, asthma, anxiety          admitted with  for lower back pain x2 to  3 weeks., arrive d via  ems   Pt was  seen at [facility] yesterday for  back pain,  had negative workup, including cr scan / report unavailable  now,  and was discharged.  home  yesterday   per  pt,  her back pain worsened  and  has difficulty in getting out of  bed/ denies  leg weakness   Denies fever/chills, cough, SOB, CP, abdominal pain, paresthesia or N/V/D.   Pt is vaccinated against COVID w/Pfizer.  last mammogram wa s this year and colonoscopy was  2  yrs  ago    in er, pt with new  rapid AFib, with  rvr.  was s een by icu and rejected  on iv amiodarone  drip , in  er/pt  wa s given    total of 70 mg cardizem in er  and iv  fluids >  3.5  L     * Afib, new, with  low  sbp   on iv amiodarone  drip  on a/c /  Eliquis  bid, started   card dr palomino   *  +  Troponin,  not  from  MI    it is  from demand ischemia  *  HLD,   on  zocor  * Fevers,   source  unclear,  with  persistent hypotension   nee d to r/o infectious  cause  has  had   worsening  back pain for  weeks/ mri spine ordered. r/o osteo  pt ha s no leg weakness on  exam  cxr, normal,  follow blood/ urine   c/s   on empiric iv rocephin  * Br  asthma    on daily  5 mg prednisone   for years, pt   with  hypotension , now  on iv stress  dose  steroids  * Echo  ordered    labs/ tsh in am  son at bedside     addendum/  8.30  pm   pt with hypotension / hr in 140/s/ / icu  recalled  pt needs  icu level care   76 year old female        w/PMH of AAA repaired x2 years ago,  HLD, OA, asthma, anxiety          admitted with  for lower back pain x2 to  3 weeks., arrive d via  ems   Pt was  seen at [facility] yesterday for  back pain,  had negative workup, including cr scan / report unavailable  now,  and was discharged.  home  yesterday   per  pt,  her back pain worsened  and  has difficulty in getting out of  bed/ denies  leg weakness   Denies fever/chills, cough, SOB, CP, abdominal pain, paresthesia or N/V/D.   Pt is vaccinated against COVID w/Pfizer.  last mammogram wa s this year and colonoscopy was  2  yrs  ago    in er, pt with new  rapid AFib, with  rvr.  was seen   by icu and rejected  on iv amiodarone  drip ,     in  er/pt  given    total of 70 mg cardizem in er  and iv  fluids >  3.5  L     * Afib, new, with  low  sbp   on iv amiodarone  drip  on a/c /  Eliquis  bid, started   card dr palomino   *  +  Troponin,  not  from  MI    it is  from demand ischemia  * Fevers,   source  unclear,  with  persistent hypotension   need  to r/o infectious  cause/  r/o sepsis  has  had   worsening  back pain for  weeks/ mri spine ordered. r/o osteo  pt ha s no leg weakness on  exam  cxr, normal,  follow blood/ urine   c/s   on empiric iv rocephin  * Br  Asthma    on daily  5 mg prednisone   for years, pt   with  hypotension , now  on iv stress  dose  steroids  * HLD, on zocor  * Echo  ordered  labs/ tsh in am  son at bedside     addendum/  8.30  pm   pt with hypotension / hr in 140/s/ / icu  recalled  pt needs  icu level care   76 year old female        w/PMH of AAA repaired x2 years ago,  HLD, OA, asthma, anxiety          admitted with  for lower back pain x2 to  3 weeks., arrive d via  ems   Pt was  seen at [facility] yesterday for  back pain,  had negative workup, including cr scan / report unavailable  now,  and was discharged.  home  yesterday   per  pt,  her back pain worsened  and  has difficulty in getting out of  bed/ denies  leg weakness   Denies fever/chills, cough, SOB, CP, abdominal pain, paresthesia or N/V/D.   Pt is vaccinated against COVID w/Pfizer.  last mammogram wa s this year and colonoscopy was  2  yrs  ago    in er, pt with new  rapid AFib, with  rvr.  was seen   by icu and rejected  on iv amiodarone  drip ,     in  er/pt  given    total of 70 mg cardizem in er  and iv  fluids >  3.5  L     * Afib, new, with  low  sbp   on iv amiodarone  drip  on a/c /  Eliquis  bid, started   card dr palomino   *  +  Troponin,  not  from  MI    it is  from demand ischemia  * Fevers,   source  unclear,  with  persistent hypotension   need  to r/o infectious  cause/  r/o sepsis  has  had   worsening  back pain for  weeks/ mri spine ordered. r/o osteo  pt ha s no leg weakness on  exam  cxr, normal,  follow blood/ urine   c/s.  esr/Crp   on empiric iv rocephin  * Br  Asthma    on daily  5 mg prednisone   for years, pt   with  hypotension , now  on iv stress  dose  steroids  * HLD, on zocor  * Echo  ordered  labs/ tsh in am  son at bedside     addendum/  8.30  pm   pt with hypotension / hr in 140/s/ / icu  recalled  pt needs  icu level care

## 2021-10-25 NOTE — ED ADULT NURSE NOTE - CHIEF COMPLAINT QUOTE
BIBA- c/o back pain X few weeks  Seen at [facility] yesterday  HX asthma, HLD, AAA abd stents

## 2021-10-25 NOTE — ED CLERICAL - DIVISION
Plan  1.  Will consult [facility].  2.  If patient does not have an appointment scheduled within 2 days he should call clinic and at that time staff will need to contact [facility] to facilitate appointment.    3.  Will try Flonase.  If this is not effective at relieving symptoms of eustachian tube dysfunction he would need to see ENT, Dr. Kat Mello. Diagnosis would be eustachian tube dysfunction.  If Flonase not tolerated, can try either Nasacort and Rhinocort which are both available over-the-counter. Unfortunately, these 2 medications are rarely if ever covered by insurance.  4.  If needed follow up after [facility].   [facility]...

## 2021-10-25 NOTE — ED ADULT NURSE REASSESSMENT NOTE - NS ED NURSE REASSESS COMMENT FT1
Recv'd patient report, patient A&Ox4. C/o back x 3 weeks, but worse last 2 days. Patient went to [facility] yesterday for back pain, seen and discharged. Patient pmh AAA repair 2 years ago, rapid afib, hld, asthma, anxiety.  Patient unable to ambulate over the last few days. Covid negative. Patient O2 saturation low 90's, put on nasal cnla 3ltrs. IV access to left wrist 22g and 18g to left forearm. Awaiting ICU consult.

## 2021-10-25 NOTE — ED ADULT TRIAGE NOTE - CHIEF COMPLAINT QUOTE
BIBA- c/o back pain X few weeks  Seen at [facility] yesterday  HX asthma, HLD, AAA abd stents

## 2021-10-25 NOTE — ED PROVIDER NOTE - PROGRESS NOTE DETAILS
pt noted be hypotensive, ivf ordered. amiodarone ordered for persistent tachycardia, ICU called for consult ICU ICU called for consult, pt still tachy despit amiodarone dr lopez and icu pa at the bedside, pt given cardizem 15mg ivp, recommend to start amiodarone drip, feel pt can go to telemetry patient becomes tachy to afib w/ RVR to 140s, additional 15 diltiazem given IVP followed by 30 PO with momentary contorl, however shortly after reverts to RVR, d/w ICU, they will accept patinet ICU called for consult, pt still tachy despite amiodarone

## 2021-10-25 NOTE — H&P ADULT - NSHPPHYSICALEXAM_GEN_ALL_CORE
PHYSICAL EXAMINATION:  Vital Signs Last 24 Hrs  T(C): 38.1 (25 Oct 2021 15:46), Max: 39 (25 Oct 2021 14:37)  T(F): 100.5 (25 Oct 2021 15:46), Max: 102.2 (25 Oct 2021 14:37)  HR: 105 (25 Oct 2021 18:41) (89 - 160)  BP: 106/76 (25 Oct 2021 18:41) (89/56 - 110/59)  BP(mean): --  RR: 20 (25 Oct 2021 16:51) (15 - 20)  SpO2: 95% (25 Oct 2021 16:51) (95% - 95%)  CAPILLARY BLOOD GLUCOSE          10-25 @ 07:01  -  10-25 @ 19:03  --------------------------------------------------------  IN: 3000 mL / OUT: 0 mL / NET: 3000 mL        GENERAL: NAD, well-groomed,  HEAD:  atraumatic, normocephalic  EYES: sclera anicteric  ENMT: mucous membranes moist  NECK: supple, No JVD  CHEST/LUNG: clear to auscultation bilaterally;    no      rales   ,   no rhonchi,   HEART: normal S1, S2  ABDOMEN: BS+, soft, ND, NT   EXTREMITIES:    no    edema    b/l LEs  NEURO: awake, ,     moves all extremities  SKIN: no     rash PHYSICAL EXAMINATION:  Vital Signs Last 24 Hrs  T(C): 38.1 (25 Oct 2021 15:46), Max: 39 (25 Oct 2021 14:37)  T(F): 100.5 (25 Oct 2021 15:46), Max: 102.2 (25 Oct 2021 14:37)  HR: 105 (25 Oct 2021 18:41) (89 - 160)  BP: 106/76 (25 Oct 2021 18:41) (89/56 - 110/59)  BP(mean): --  RR: 20 (25 Oct 2021 16:51) (15 - 20)  SpO2: 95% (25 Oct 2021 16:51) (95% - 95%)  CAPILLARY BLOOD GLUCOSE          10-25 @ 07:01  -  10-25 @ 19:03  --------------------------------------------------------  IN: 3000 mL / OUT: 0 mL / NET: 3000 mL        GENERAL: NAD, well-groomed,  HEAD:  atraumatic, normocephalic  EYES: sclera anicteric  ENMT: mucous membranes moist  NECK: supple, No JVD  CHEST/LUNG: clear to auscultation bilaterally;    no      rales   ,   no rhonchi,   HEART: normal S1, S2  ABDOMEN: BS+, soft, ND, NT   EXTREMITIES:    no    edema    b/l LEs  NEURO: awake, ,     moves all extremities  no leg weakness/ no point  spinal tenderness  SKIN: no     rash

## 2021-10-25 NOTE — CONSULT NOTE ADULT - SUBJECTIVE AND OBJECTIVE BOX
Patient is a 76y old  Female who presents with a chief complaint of     HPI:   76 year old female        w/PMH of AAA repaired x2 years ago,  HLD, OA, asthma, anxiety       presents to the ED [facility] for lower back pain x2  3 weeks.   Pt seen at [facility] yesterday for similar complaints, had negative workup, including cr scan / report unavailable  now,  and was discharged.    States her back pain worsened yesterday while laying on the table for her CT scan.    States she has had back pain in the past. . Reports pain is to the point where she cannot get herself out of bed.    Denies fever/chills, cough, SOB, CP, abdominal pain, paresthesia or N/V/D.    Pt is vaccinated against COVID w/Pfizer.  in er, pt with new  rapid AFib, with  rvr  Seen by icu and rejected  on iv amiodarone  drip , in  er (25 Oct 2021 18:58)      Allergies    aspirin (Short breath)  penicillin (Stomach Upset)  Sulfacetamide Sodium-Sulfur (Stomach Upset)    MEDICATIONS  NEURO  Meds: diazepam    Tablet 2 milliGRAM(s) Oral daily PRN anxiety    RESPIRATORY  ABG - ( 25 Oct 2021 14:47 )  pH: x     /  pCO2: x     /  pO2: x     / HCO3: x     / Base Excess: x     /  SaO2: x       Lactate: 1.9      Meds:   CARDIOVASCULAR  Meds: aMIOdarone Infusion 1 mG/Min (33.3 mL/Hr) IV Continuous <Continuous>    GI/NUTRITION  GENITOURINARY  Meds: sodium chloride 0.9%. 1000 milliLiter(s) IV Continuous <Continuous>    HEMATOLOGIC  Meds: apixaban 5 milliGRAM(s) Oral two times a day    [x] VTE Prophylaxis  INFECTIOUS DISEASES  Meds: cefTRIAXone   IVPB 1000 milliGRAM(s) IV Intermittent every 24 hours    ENDOCRINE  CAPILLARY BLOOD GLUCOSE    Meds:   OTHER MEDICATIONS:  lidocaine   4% Patch 1 Patch Transdermal Once    Drug Dosing Weight  Height (cm): 157.5 (25 Oct 2021 13:27)  Weight (kg): 61.2 (25 Oct 2021 13:27)  BMI (kg/m2): 24.7 (25 Oct 2021 13:27)  BSA (m2): 1.62 (25 Oct 2021 13:27)    PAST MEDICAL & SURGICAL HISTORY:  Asthma  HLD (hyperlipidemia)  OA (osteoarthritis) Right Hip  Anxiety disorder  History of abdominal aortic aneurysm (AAA)   Stent placed 3/22/19  S/P left knee surgery    REVIEW OF SYSTEMS: Pt complaining of back pain. No other complaints at this time.     Vital Signs Last 24 Hrs  T(C): 36.6 (25 Oct 2021 19:24), Max: 39 (25 Oct 2021 14:37)  T(F): 97.9 (25 Oct 2021 19:24), Max: 102.2 (25 Oct 2021 14:37)  HR: 107 (25 Oct 2021 20:07) (89 - 160)  BP: 105/46 (25 Oct 2021 20:07) (88/56 - 110/59)  RR: 16 (25 Oct 2021 20:07) (15 - 20)  SpO2: 97% (25 Oct 2021 20:07) (95% - 97%)    I&O's Detail    25 Oct 2021 07:01  -  25 Oct 2021 20:33  --------------------------------------------------------  IN:    Sodium Chloride 0.9% Bolus: 3000 mL  Total IN: 3000 mL    OUT:    Voided (mL): 200 mL  Total OUT: 200 mL    Total NET: 2800 mL    PHYSICAL EXAM:          LABS:                        16.1   11.94 )-----------( 206      ( 25 Oct 2021 14:38 )             49.5     10-25    139  |  108  |  25<H>  ----------------------------<  112<H>  4.3   |  24  |  1.00    Ca    8.8      25 Oct 2021 14:38    TPro  6.3  /  Alb  3.0<L>  /  TBili  0.5  /  DBili  x   /  AST  30  /  ALT  13  /  AlkPhos  76  10-25    PT/INR - ( 25 Oct 2021 14:38 )   PT: 13.0 sec;   INR: 1.13 ratio    PTT - ( 25 Oct 2021 14:38 )  PTT:32.3 sec    Urinalysis Basic - ( 25 Oct 2021 18:40 )  Color: Yellow / Appearance: Clear / S.020 / pH: x  Gluc: x / Ketone: Moderate  / Bili: Negative / Urobili: Negative mg/dL   Blood: x / Protein: 15 mg/dL / Nitrite: Negative   Leuk Esterase: Negative / RBC: 0-2 /HPF / WBC 0-2   Sq Epi: x / Non Sq Epi: Occasional / Bacteria: Moderate      CRITICAL CARE TIME SPENT:   HPI:  Pt is a 75 yo WF with h/o asthma, AAA repaired x2 years ago,  HLD, OA, and , anxiety  BIBEMS 2 to lower back pain x2-3 weeks. Pt seen at [facility] yesterday for similar complaints, had negative workup and was discharged. States her back pain worsened yesterday while laying on the table for her CT scan. States she has had back pain in the past but this is worse. Reports pain is to the point where she cannot get herself out of bed. In the ER pt found to be febrile 102.2 and in A fib with RVR with borderline low BP but not requiring pressors. Pt Rx'd with IVF, IV and Po Cardizem, IV Amio and then loaded with IV Amio.    Allergies    aspirin (Short breath)  penicillin (Stomach Upset)  Sulfacetamide Sodium-Sulfur (Stomach Upset)    MEDICATIONS  NEURO  Meds: diazepam    Tablet 2 milliGRAM(s) Oral daily PRN anxiety    RESPIRATORY  ABG - ( 25 Oct 2021 14:47 )  pH: x     /  pCO2: x     /  pO2: x     / HCO3: x     / Base Excess: x     /  SaO2: x       Lactate: 1.9      Meds:   CARDIOVASCULAR  Meds: aMIOdarone Infusion 1 mG/Min (33.3 mL/Hr) IV Continuous <Continuous>    GI/NUTRITION  GENITOURINARY  Meds: sodium chloride 0.9%. 1000 milliLiter(s) IV Continuous <Continuous>    HEMATOLOGIC  Meds: apixaban 5 milliGRAM(s) Oral two times a day    [x] VTE Prophylaxis  INFECTIOUS DISEASES  Meds: cefTRIAXone   IVPB 1000 milliGRAM(s) IV Intermittent every 24 hours    ENDOCRINE  CAPILLARY BLOOD GLUCOSE    Meds:   OTHER MEDICATIONS:  lidocaine   4% Patch 1 Patch Transdermal Once    Drug Dosing Weight  Height (cm): 157.5 (25 Oct 2021 13:27)  Weight (kg): 61.2 (25 Oct 2021 13:27)  BMI (kg/m2): 24.7 (25 Oct 2021 13:27)  BSA (m2): 1.62 (25 Oct 2021 13:27)    PAST MEDICAL & SURGICAL HISTORY:  Asthma  HLD (hyperlipidemia)  OA (osteoarthritis) Right Hip  Anxiety disorder  History of abdominal aortic aneurysm (AAA)   Stent placed 3/22/19  S/P left knee surgery    REVIEW OF SYSTEMS: Pt complaining of back pain. No other complaints at this time.     Vital Signs Last 24 Hrs  T(C): 36.6 (25 Oct 2021 19:24), Max: 39 (25 Oct 2021 14:37)  T(F): 97.9 (25 Oct 2021 19:24), Max: 102.2 (25 Oct 2021 14:37)  HR: 107 (25 Oct 2021 20:07) (89 - 160)  BP: 105/46 (25 Oct 2021 20:07) (88/56 - 110/59)  RR: 16 (25 Oct 2021 20:07) (15 - 20)  SpO2: 97% (25 Oct 2021 20:07) (95% - 97%)    I&O's Detail    25 Oct 2021 07:01  -  25 Oct 2021 20:33  --------------------------------------------------------  IN:    Sodium Chloride 0.9% Bolus: 3000 mL  Total IN: 3000 mL    OUT:    Voided (mL): 200 mL  Total OUT: 200 mL    Total NET: 2800 mL    PHYSICAL EXAM:    Lungs: CTA, no wheezes  Heart: Tachy and irregular  Abd: Soft/+BS  Ext: No edema  Neuro: AAO x3, moving all extremities          LABS:                        16.1   11.94 )-----------( 206      ( 25 Oct 2021 14:38 )             49.5     10-25    139  |  108  |  25<H>  ----------------------------<  112<H>  4.3   |  24  |  1.00    Ca    8.8      25 Oct 2021 14:38    TPro  6.3  /  Alb  3.0<L>  /  TBili  0.5  /  DBili  x   /  AST  30  /  ALT  13  /  AlkPhos  76  10-25    PT/INR - ( 25 Oct 2021 14:38 )   PT: 13.0 sec;   INR: 1.13 ratio    PTT - ( 25 Oct 2021 14:38 )  PTT:32.3 sec    Urinalysis Basic - ( 25 Oct 2021 18:40 )  Color: Yellow / Appearance: Clear / S.020 / pH: x  Gluc: x / Ketone: Moderate  / Bili: Negative / Urobili: Negative mg/dL   Blood: x / Protein: 15 mg/dL / Nitrite: Negative   Leuk Esterase: Negative / RBC: 0-2 /HPF / WBC 0-2   Sq Epi: x / Non Sq Epi: Occasional / Bacteria: Moderate

## 2021-10-25 NOTE — H&P ADULT - NSHPLABSRESULTS_GEN_ALL_CORE
LABS:                        16.1   11.94 )-----------( 206      ( 25 Oct 2021 14:38 )             49.5     10-25    139  |  108  |  25<H>  ----------------------------<  112<H>  4.3   |  24  |  1.00    Ca    8.8      25 Oct 2021 14:38    TPro  6.3  /  Alb  3.0<L>  /  TBili  0.5  /  DBili  x   /  AST  30  /  ALT  13  /  AlkPhos  76  10    PT/INR - ( 25 Oct 2021 14:38 )   PT: 13.0 sec;   INR: 1.13 ratio         PTT - ( 25 Oct 2021 14:38 )  PTT:32.3 sec  CARDIAC MARKERS ( 25 Oct 2021 14:38 )  .449 ng/mL / x     / 122 U/L / x     / 3.7 ng/mL      Urinalysis Basic - ( 25 Oct 2021 18:40 )    Color: Yellow / Appearance: Clear / S.020 / pH: x  Gluc: x / Ketone: Moderate  / Bili: Negative / Urobili: Negative mg/dL   Blood: x / Protein: 15 mg/dL / Nitrite: Negative   Leuk Esterase: Negative / RBC: x / WBC x   Sq Epi: x / Non Sq Epi: x / Bacteria: x      Lactate, Blood: 1.9 mmol/L (10-25 @ 14:47)

## 2021-10-26 LAB
ALBUMIN SERPL ELPH-MCNC: 2.3 G/DL — LOW (ref 3.3–5)
ALP SERPL-CCNC: 62 U/L — SIGNIFICANT CHANGE UP (ref 40–120)
ALT FLD-CCNC: 13 U/L — SIGNIFICANT CHANGE UP (ref 12–78)
ANION GAP SERPL CALC-SCNC: 6 MMOL/L — SIGNIFICANT CHANGE UP (ref 5–17)
AST SERPL-CCNC: 41 U/L — HIGH (ref 15–37)
BILIRUB SERPL-MCNC: 0.4 MG/DL — SIGNIFICANT CHANGE UP (ref 0.2–1.2)
BUN SERPL-MCNC: 18 MG/DL — SIGNIFICANT CHANGE UP (ref 7–23)
CALCIUM SERPL-MCNC: 7.3 MG/DL — LOW (ref 8.5–10.1)
CHLORIDE SERPL-SCNC: 112 MMOL/L — HIGH (ref 96–108)
CK MB BLD-MCNC: 4.6 % — HIGH (ref 0–3.5)
CK MB BLD-MCNC: 5.9 % — HIGH (ref 0–3.5)
CK MB CFR SERPL CALC: 13.4 NG/ML — HIGH (ref 0.5–3.6)
CK MB CFR SERPL CALC: 7 NG/ML — HIGH (ref 0.5–3.6)
CK MB CFR SERPL CALC: 9.4 NG/ML — HIGH (ref 0.5–3.6)
CK SERPL-CCNC: 152 U/L — SIGNIFICANT CHANGE UP (ref 26–192)
CK SERPL-CCNC: 158 U/L — SIGNIFICANT CHANGE UP (ref 26–192)
CO2 SERPL-SCNC: 21 MMOL/L — LOW (ref 22–31)
COVID-19 SPIKE DOMAIN AB INTERP: POSITIVE
COVID-19 SPIKE DOMAIN ANTIBODY RESULT: >250 U/ML — HIGH
CREAT SERPL-MCNC: 0.7 MG/DL — SIGNIFICANT CHANGE UP (ref 0.5–1.3)
CRP SERPL-MCNC: 145 MG/L — HIGH
ERYTHROCYTE [SEDIMENTATION RATE] IN BLOOD: 5 MM/HR — SIGNIFICANT CHANGE UP (ref 0–20)
GLUCOSE SERPL-MCNC: 120 MG/DL — HIGH (ref 70–99)
HCT VFR BLD CALC: 45.2 % — HIGH (ref 34.5–45)
HGB BLD-MCNC: 14.6 G/DL — SIGNIFICANT CHANGE UP (ref 11.5–15.5)
MAGNESIUM SERPL-MCNC: 1.9 MG/DL — SIGNIFICANT CHANGE UP (ref 1.6–2.6)
MCHC RBC-ENTMCNC: 27.2 PG — SIGNIFICANT CHANGE UP (ref 27–34)
MCHC RBC-ENTMCNC: 32.3 GM/DL — SIGNIFICANT CHANGE UP (ref 32–36)
MCV RBC AUTO: 84.2 FL — SIGNIFICANT CHANGE UP (ref 80–100)
NRBC # BLD: 0 /100 WBCS — SIGNIFICANT CHANGE UP (ref 0–0)
PHOSPHATE SERPL-MCNC: 2.5 MG/DL — SIGNIFICANT CHANGE UP (ref 2.5–4.5)
PLATELET # BLD AUTO: 178 K/UL — SIGNIFICANT CHANGE UP (ref 150–400)
POTASSIUM SERPL-MCNC: 4.3 MMOL/L — SIGNIFICANT CHANGE UP (ref 3.5–5.3)
POTASSIUM SERPL-SCNC: 4.3 MMOL/L — SIGNIFICANT CHANGE UP (ref 3.5–5.3)
PROT SERPL-MCNC: 5.3 GM/DL — LOW (ref 6–8.3)
RBC # BLD: 5.37 M/UL — HIGH (ref 3.8–5.2)
RBC # FLD: 14.8 % — HIGH (ref 10.3–14.5)
SARS-COV-2 IGG+IGM SERPL QL IA: >250 U/ML — HIGH
SARS-COV-2 IGG+IGM SERPL QL IA: POSITIVE
SODIUM SERPL-SCNC: 139 MMOL/L — SIGNIFICANT CHANGE UP (ref 135–145)
TROPONIN I SERPL-MCNC: 2.19 NG/ML — HIGH (ref 0.01–0.04)
TROPONIN I SERPL-MCNC: 2.78 NG/ML — HIGH (ref 0.01–0.04)
TROPONIN I, HIGH SENSITIVITY RESULT: 1778.5 NG/L — HIGH
TROPONIN I, HIGH SENSITIVITY RESULT: 1915.6 NG/L — HIGH
TSH SERPL-MCNC: 0.34 UU/ML — LOW (ref 0.36–3.74)
WBC # BLD: 11.8 K/UL — HIGH (ref 3.8–10.5)
WBC # FLD AUTO: 11.8 K/UL — HIGH (ref 3.8–10.5)

## 2021-10-26 PROCEDURE — 99232 SBSQ HOSP IP/OBS MODERATE 35: CPT

## 2021-10-26 PROCEDURE — 93306 TTE W/DOPPLER COMPLETE: CPT | Mod: 26

## 2021-10-26 PROCEDURE — 93010 ELECTROCARDIOGRAM REPORT: CPT | Mod: 76

## 2021-10-26 RX ORDER — DIPHENHYDRAMINE HCL 50 MG
25 CAPSULE ORAL ONCE
Refills: 0 | Status: COMPLETED | OUTPATIENT
Start: 2021-10-26 | End: 2021-10-26

## 2021-10-26 RX ORDER — ENOXAPARIN SODIUM 100 MG/ML
60 INJECTION SUBCUTANEOUS EVERY 12 HOURS
Refills: 0 | Status: DISCONTINUED | OUTPATIENT
Start: 2021-10-26 | End: 2021-10-27

## 2021-10-26 RX ORDER — LIDOCAINE 4 G/100G
1 CREAM TOPICAL EVERY 24 HOURS
Refills: 0 | Status: DISCONTINUED | OUTPATIENT
Start: 2021-10-26 | End: 2021-11-03

## 2021-10-26 RX ORDER — ALBUTEROL 90 UG/1
2 AEROSOL, METERED ORAL EVERY 6 HOURS
Refills: 0 | Status: COMPLETED | OUTPATIENT
Start: 2021-10-26 | End: 2021-10-28

## 2021-10-26 RX ORDER — DIPHENHYDRAMINE HCL 50 MG
25 CAPSULE ORAL EVERY 6 HOURS
Refills: 0 | Status: COMPLETED | OUTPATIENT
Start: 2021-10-26 | End: 2021-10-27

## 2021-10-26 RX ADMIN — ENOXAPARIN SODIUM 60 MILLIGRAM(S): 100 INJECTION SUBCUTANEOUS at 17:19

## 2021-10-26 RX ADMIN — Medication 40 MILLIGRAM(S): at 23:18

## 2021-10-26 RX ADMIN — ALBUTEROL 2 PUFF(S): 90 AEROSOL, METERED ORAL at 22:57

## 2021-10-26 RX ADMIN — SIMVASTATIN 20 MILLIGRAM(S): 20 TABLET, FILM COATED ORAL at 21:49

## 2021-10-26 RX ADMIN — LIDOCAINE 1 PATCH: 4 CREAM TOPICAL at 08:00

## 2021-10-26 RX ADMIN — Medication 25 MILLIGRAM(S): at 21:59

## 2021-10-26 RX ADMIN — Medication 25 MILLIGRAM(S): at 13:40

## 2021-10-26 RX ADMIN — Medication 50 MILLIGRAM(S): at 06:21

## 2021-10-26 RX ADMIN — LIDOCAINE 1 PATCH: 4 CREAM TOPICAL at 21:49

## 2021-10-26 RX ADMIN — APIXABAN 5 MILLIGRAM(S): 2.5 TABLET, FILM COATED ORAL at 06:19

## 2021-10-26 RX ADMIN — CHLORHEXIDINE GLUCONATE 1 APPLICATION(S): 213 SOLUTION TOPICAL at 06:22

## 2021-10-26 RX ADMIN — SODIUM CHLORIDE 75 MILLILITER(S): 9 INJECTION INTRAMUSCULAR; INTRAVENOUS; SUBCUTANEOUS at 06:19

## 2021-10-26 RX ADMIN — AMIODARONE HYDROCHLORIDE 16.7 MG/MIN: 400 TABLET ORAL at 01:31

## 2021-10-26 NOTE — PROGRESS NOTE ADULT - SUBJECTIVE AND OBJECTIVE BOX
Patient is a 76y old  Female who presents with a chief complaint of   INTERVAL HPI/OVERNIGHT EVENTS:    MEDICATIONS  (STANDING):  aMIOdarone Infusion 1 mG/Min (33.3 mL/Hr) IV Continuous <Continuous>  aMIOdarone Infusion 0.5 mG/Min (16.7 mL/Hr) IV Continuous <Continuous>  chlorhexidine 2% Cloths 1 Application(s) Topical <User Schedule>  diphenhydrAMINE Injectable 25 milliGRAM(s) IV Push once  enoxaparin Injectable 60 milliGRAM(s) SubCutaneous every 12 hours  influenza   Vaccine 0.5 milliLiter(s) IntraMuscular once  simvastatin 20 milliGRAM(s) Oral at bedtime    MEDICATIONS  (PRN):    Allergies    aspirin (Short breath)  penicillin (Stomach Upset)  Sulfacetamide Sodium-Sulfur (Stomach Upset)    Intolerances      REVIEW OF SYSTEMS:  All other systems reviewed and are negative    Vital Signs Last 24 Hrs  T(C): 36.3 (26 Oct 2021 11:32), Max: 39 (25 Oct 2021 14:37)  T(F): 97.3 (26 Oct 2021 11:32), Max: 102.2 (25 Oct 2021 14:37)  HR: 73 (26 Oct 2021 07:15) (71 - 160)  BP: 114/76 (26 Oct 2021 07:00) (88/56 - 120/64)  BP(mean): 86 (26 Oct 2021 07:00) (61 - 90)  RR: 20 (26 Oct 2021 07:15) (12 - 32)  SpO2: 100% (26 Oct 2021 07:15) (95% - 100%)  Daily Height in cm: 154.94 (25 Oct 2021 22:30)    Daily   I&O's Summary    25 Oct 2021 07:  -  26 Oct 2021 07:00  --------------------------------------------------------  IN: 3866.2 mL / OUT: 400 mL / NET: 3466.2 mL    26 Oct 2021 07:01  -  26 Oct 2021 13:36  --------------------------------------------------------  IN: 16.7 mL / OUT: 100 mL / NET: -83.3 mL      CAPILLARY BLOOD GLUCOSE        PHYSICAL EXAM:  GENERAL: NAD,    CHEST/LUNG: Clear to percussion bilaterally; No rales, rhonchi, wheezing, or rubs  HEART: Regular rate and rhythm; No murmurs, rubs, or gallops  ABDOMEN: Soft, Nontender, Nondistended; Bowel sounds present  EXTREMITIES:  2+ Peripheral Pulses, No clubbing, cyanosis, or edema  LYMPH: No lymphadenopathy noted  SKIN: No rashes or lesions    Labs                          14.6   11.80 )-----------( 178      ( 26 Oct 2021 00:33 )             45.2     10-26    139  |  112<H>  |  18  ----------------------------<  120<H>  4.3   |  21<L>  |  0.70    Ca    7.3<L>      26 Oct 2021 00:33  Phos  2.5     10-26  Mg     1.9     10-26    TPro  5.3<L>  /  Alb  2.3<L>  /  TBili  0.4  /  DBili  x   /  AST  41<H>  /  ALT  13  /  AlkPhos  62  10-26    PT/INR - ( 25 Oct 2021 14:38 )   PT: 13.0 sec;   INR: 1.13 ratio         PTT - ( 25 Oct 2021 14:38 )  PTT:32.3 sec  CARDIAC MARKERS ( 26 Oct 2021 09:26 )  2.780 ng/mL / x     / x     / x     / 13.4 ng/mL  CARDIAC MARKERS ( 26 Oct 2021 00:33 )  2.190 ng/mL / x     / x     / x     / x      CARDIAC MARKERS ( 25 Oct 2021 14:38 )  .449 ng/mL / x     / 122 U/L / x     / 3.7 ng/mL      Urinalysis Basic - ( 25 Oct 2021 18:40 )    Color: Yellow / Appearance: Clear / S.020 / pH: x  Gluc: x / Ketone: Moderate  / Bili: Negative / Urobili: Negative mg/dL   Blood: x / Protein: 15 mg/dL / Nitrite: Negative   Leuk Esterase: Negative / RBC: 0-2 /HPF / WBC 0-2   Sq Epi: x / Non Sq Epi: Occasional / Bacteria: Moderate                  DVT prophylaxis: > Lovenox 40mg SQ daily  > Heparin   > SCD's

## 2021-10-26 NOTE — PROGRESS NOTE ADULT - ASSESSMENT
76 year old female      w/PMH of AAA repaired x2 years ago,  HLD, OA, asthma, anxiety      admitted with  for lower back pain x2 to  3 weeks., arrive d via  ems   Pt was  seen at [facility] yesterday for  back pain,  had negative workup, including cr scan / report unavailable  now,  and was discharged.  home  yesterday   per  pt,  her back pain worsened  and  has difficulty in getting out of  bed/ denies  leg weakness in er, pt with new  rapid AFib, with  rvr.  was seen   by icu and rejectedon iv amiodarone  drip ,     in  er/pt  given    total of 70 mg cardizem in er  and iv  fluids >  3.5  L     * Afib, new, with  low  sbp UNDER ICU CARE    on iv amiodarone  drip  on a/c Lovenox   card dr palomino   *  +  Troponin,    * Fevers,   source  unclear,  with  persistent hypotension   need  to r/o infectious  cause/  r/o sepsis  has  had   worsening  back pain for  weeks/ mri spine ordered. r/o osteo  pt ha s no leg weakness on  exam  cxr, normal,  follow blood/ urine   c/s.  esr/Crp   on empiric iv rocephin    * Br  Asthma    on daily  5 mg prednisone   for years, pt   with  hypotension   * HLD, on zocor  * Echo  ordered  labs/ tsh in am

## 2021-10-26 NOTE — PHARMACOTHERAPY INTERVENTION NOTE - COMMENTS
Recommended discontinuation of hydrocortisone IV stress dose since it is not indicated at this time.
Spoke to PA and recommended to lower dose or d/c digoxin while on Amiodarone Iv infusion. PA would like to discontinue digoxin order.

## 2021-10-27 LAB
ALBUMIN SERPL ELPH-MCNC: 2.1 G/DL — LOW (ref 3.3–5)
ALP SERPL-CCNC: 57 U/L — SIGNIFICANT CHANGE UP (ref 40–120)
ALT FLD-CCNC: 13 U/L — SIGNIFICANT CHANGE UP (ref 12–78)
ANION GAP SERPL CALC-SCNC: 4 MMOL/L — LOW (ref 5–17)
APTT BLD: 37.3 SEC — HIGH (ref 27.5–35.5)
AST SERPL-CCNC: 30 U/L — SIGNIFICANT CHANGE UP (ref 15–37)
BILIRUB SERPL-MCNC: 0.2 MG/DL — SIGNIFICANT CHANGE UP (ref 0.2–1.2)
BUN SERPL-MCNC: 14 MG/DL — SIGNIFICANT CHANGE UP (ref 7–23)
CALCIUM SERPL-MCNC: 8.3 MG/DL — LOW (ref 8.5–10.1)
CHLORIDE SERPL-SCNC: 112 MMOL/L — HIGH (ref 96–108)
CK MB CFR SERPL CALC: 4 NG/ML — HIGH (ref 0.5–3.6)
CO2 SERPL-SCNC: 23 MMOL/L — SIGNIFICANT CHANGE UP (ref 22–31)
CREAT SERPL-MCNC: 0.68 MG/DL — SIGNIFICANT CHANGE UP (ref 0.5–1.3)
CULTURE RESULTS: NO GROWTH — SIGNIFICANT CHANGE UP
GLUCOSE SERPL-MCNC: 134 MG/DL — HIGH (ref 70–99)
HCT VFR BLD CALC: 42 % — SIGNIFICANT CHANGE UP (ref 34.5–45)
HGB BLD-MCNC: 13.7 G/DL — SIGNIFICANT CHANGE UP (ref 11.5–15.5)
INR BLD: 1.31 RATIO — HIGH (ref 0.88–1.16)
MAGNESIUM SERPL-MCNC: 2.4 MG/DL — SIGNIFICANT CHANGE UP (ref 1.6–2.6)
MCHC RBC-ENTMCNC: 27.3 PG — SIGNIFICANT CHANGE UP (ref 27–34)
MCHC RBC-ENTMCNC: 32.6 GM/DL — SIGNIFICANT CHANGE UP (ref 32–36)
MCV RBC AUTO: 83.8 FL — SIGNIFICANT CHANGE UP (ref 80–100)
NRBC # BLD: 0 /100 WBCS — SIGNIFICANT CHANGE UP (ref 0–0)
PHOSPHATE SERPL-MCNC: 1.2 MG/DL — LOW (ref 2.5–4.5)
PLATELET # BLD AUTO: 178 K/UL — SIGNIFICANT CHANGE UP (ref 150–400)
POTASSIUM SERPL-MCNC: 4.4 MMOL/L — SIGNIFICANT CHANGE UP (ref 3.5–5.3)
POTASSIUM SERPL-SCNC: 4.4 MMOL/L — SIGNIFICANT CHANGE UP (ref 3.5–5.3)
PROCALCITONIN SERPL-MCNC: 0.26 NG/ML — HIGH (ref 0.02–0.1)
PROT SERPL-MCNC: 5.1 GM/DL — LOW (ref 6–8.3)
PROTHROM AB SERPL-ACNC: 15 SEC — HIGH (ref 10.6–13.6)
RBC # BLD: 5.01 M/UL — SIGNIFICANT CHANGE UP (ref 3.8–5.2)
RBC # FLD: 14.9 % — HIGH (ref 10.3–14.5)
SODIUM SERPL-SCNC: 139 MMOL/L — SIGNIFICANT CHANGE UP (ref 135–145)
SPECIMEN SOURCE: SIGNIFICANT CHANGE UP
TROPONIN I, HIGH SENSITIVITY RESULT: 866 NG/L — HIGH
WBC # BLD: 7.83 K/UL — SIGNIFICANT CHANGE UP (ref 3.8–10.5)
WBC # FLD AUTO: 7.83 K/UL — SIGNIFICANT CHANGE UP (ref 3.8–10.5)

## 2021-10-27 PROCEDURE — 99291 CRITICAL CARE FIRST HOUR: CPT

## 2021-10-27 PROCEDURE — 72156 MRI NECK SPINE W/O & W/DYE: CPT | Mod: 26

## 2021-10-27 PROCEDURE — 99233 SBSQ HOSP IP/OBS HIGH 50: CPT

## 2021-10-27 PROCEDURE — 93010 ELECTROCARDIOGRAM REPORT: CPT

## 2021-10-27 PROCEDURE — 72157 MRI CHEST SPINE W/O & W/DYE: CPT | Mod: 26

## 2021-10-27 PROCEDURE — 99223 1ST HOSP IP/OBS HIGH 75: CPT

## 2021-10-27 PROCEDURE — 72149 MRI LUMBAR SPINE W/DYE: CPT | Mod: 26

## 2021-10-27 RX ORDER — POTASSIUM PHOSPHATE, MONOBASIC POTASSIUM PHOSPHATE, DIBASIC 236; 224 MG/ML; MG/ML
15 INJECTION, SOLUTION INTRAVENOUS ONCE
Refills: 0 | Status: COMPLETED | OUTPATIENT
Start: 2021-10-27 | End: 2021-10-27

## 2021-10-27 RX ORDER — DIPHENHYDRAMINE HCL 50 MG
25 CAPSULE ORAL ONCE
Refills: 0 | Status: COMPLETED | OUTPATIENT
Start: 2021-10-27 | End: 2021-10-27

## 2021-10-27 RX ORDER — HEPARIN SODIUM 5000 [USP'U]/ML
3800 INJECTION INTRAVENOUS; SUBCUTANEOUS EVERY 6 HOURS
Refills: 0 | Status: DISCONTINUED | OUTPATIENT
Start: 2021-10-27 | End: 2021-10-29

## 2021-10-27 RX ORDER — BUDESONIDE AND FORMOTEROL FUMARATE DIHYDRATE 160; 4.5 UG/1; UG/1
2 AEROSOL RESPIRATORY (INHALATION)
Refills: 0 | Status: DISCONTINUED | OUTPATIENT
Start: 2021-10-27 | End: 2021-11-03

## 2021-10-27 RX ORDER — SODIUM,POTASSIUM PHOSPHATES 278-250MG
2 POWDER IN PACKET (EA) ORAL ONCE
Refills: 0 | Status: COMPLETED | OUTPATIENT
Start: 2021-10-27 | End: 2021-10-27

## 2021-10-27 RX ORDER — HEPARIN SODIUM 5000 [USP'U]/ML
INJECTION INTRAVENOUS; SUBCUTANEOUS
Qty: 25000 | Refills: 0 | Status: DISCONTINUED | OUTPATIENT
Start: 2021-10-27 | End: 2021-10-29

## 2021-10-27 RX ADMIN — Medication 40 MILLIGRAM(S): at 06:32

## 2021-10-27 RX ADMIN — LIDOCAINE 1 PATCH: 4 CREAM TOPICAL at 19:29

## 2021-10-27 RX ADMIN — ALBUTEROL 2 PUFF(S): 90 AEROSOL, METERED ORAL at 11:33

## 2021-10-27 RX ADMIN — ALBUTEROL 2 PUFF(S): 90 AEROSOL, METERED ORAL at 18:05

## 2021-10-27 RX ADMIN — Medication 25 MILLIGRAM(S): at 14:13

## 2021-10-27 RX ADMIN — POTASSIUM PHOSPHATE, MONOBASIC POTASSIUM PHOSPHATE, DIBASIC 62.5 MILLIMOLE(S): 236; 224 INJECTION, SOLUTION INTRAVENOUS at 06:33

## 2021-10-27 RX ADMIN — ALBUTEROL 2 PUFF(S): 90 AEROSOL, METERED ORAL at 06:34

## 2021-10-27 RX ADMIN — Medication 25 MILLIGRAM(S): at 09:58

## 2021-10-27 RX ADMIN — SIMVASTATIN 20 MILLIGRAM(S): 20 TABLET, FILM COATED ORAL at 22:41

## 2021-10-27 RX ADMIN — Medication 25 MILLIGRAM(S): at 18:03

## 2021-10-27 RX ADMIN — BUDESONIDE AND FORMOTEROL FUMARATE DIHYDRATE 2 PUFF(S): 160; 4.5 AEROSOL RESPIRATORY (INHALATION) at 18:06

## 2021-10-27 RX ADMIN — CHLORHEXIDINE GLUCONATE 1 APPLICATION(S): 213 SOLUTION TOPICAL at 12:47

## 2021-10-27 RX ADMIN — LIDOCAINE 1 PATCH: 4 CREAM TOPICAL at 22:41

## 2021-10-27 RX ADMIN — ENOXAPARIN SODIUM 60 MILLIGRAM(S): 100 INJECTION SUBCUTANEOUS at 06:34

## 2021-10-27 RX ADMIN — LIDOCAINE 1 PATCH: 4 CREAM TOPICAL at 07:00

## 2021-10-27 RX ADMIN — Medication 40 MILLIGRAM(S): at 16:46

## 2021-10-27 RX ADMIN — Medication 25 MILLIGRAM(S): at 06:32

## 2021-10-27 RX ADMIN — Medication 40 MILLIGRAM(S): at 05:24

## 2021-10-27 RX ADMIN — HEPARIN SODIUM 750 UNIT(S)/HR: 5000 INJECTION INTRAVENOUS; SUBCUTANEOUS at 18:07

## 2021-10-27 RX ADMIN — Medication 2 PACKET(S): at 11:16

## 2021-10-27 NOTE — CONSULT NOTE ADULT - SUBJECTIVE AND OBJECTIVE BOX
MAYTE PEREIRA  MRN-[id number]        Patient is a 76y old  Female who presents with a chief complaint of     HPI:   76 year old female        w/PMH of AAA repaired x2 years ago,  HLD, OA, asthma, anxiety         presents to the ED BIBEMS for lower back pain x2  3 weeks.   Pt seen at [facility] yesterday for similar complaints, had negative workup, including cr scan / report unavailable  now,  and was discharged. home  yesterday    States her back pain worsened yesterday while laying on the table for her CT scan.   now  with worsening  pain,  and  has   difficulty in betting out if bed   denies  leg weakness    Denies fever/chills, cough, SOB, CP, abdominal pain, paresthesia or N/V/D.    Pt is vaccinated against COVID w/Pfizer.  In  er, pt with new  rapid AFib, with  RVR  Seen by icu and rejected  on iv amiodarone  drip ,by Er dr (25 Oct 2021 18:58)     77 yo WF with h/o asthma, AAA repaired x2 years ago,  HLD, OA, and , anxiety  BIBEMS 2 to lower back pain x2-3 weeks. Pt seen at [facility] yesterday for similar complaints, had negative workup and was discharged. States her back pain worsened yesterday while laying on the table for her CT scan. States she has had back pain in the past but this is worse. Reports pain is to the point where she cannot get herself out of bed. In the ER pt found to be febrile 102.2 and in A fib with RVR with borderline low BP but not requiring pressors. Pt Rx'd with IVF, IV and Po Cardizem, IV Amio and then loaded with IV Amio. MRI today showed possible discitis with abscess. Reviewed MRI with Dr. Taylor and DDx includes a compression fracture with extruded disk. 2 sets of blood cultures negative. She had a rash after getting ceftriaxone and is now on steroids and antihistamines   ID consulted for workup and antibiotic management     PAST MEDICAL & SURGICAL HISTORY:  Asthma    HLD (hyperlipidemia)    OA (osteoarthritis)  Right Hip    Anxiety disorder    History of abdominal aortic aneurysm (AAA)  Stent placed 3/22/19    S/P AAA repair  Stent Placed 3/22/19    S/P left knee surgery        Allergies  aspirin (Short breath)  ceftriaxone (Rash (Moderate); Pruritus)  penicillin (Stomach Upset)  Sulfacetamide Sodium-Sulfur (Stomach Upset)        ANTIMICROBIALS:      MEDICATIONS  (STANDING):  cefTRIAXone   IVPB   100 mL/Hr IV Intermittent (10-25-21 @ 22:48)        OTHER MEDS: MEDICATIONS  (STANDING):  ALBUTerol    90 MICROgram(s) HFA Inhaler 2 every 6 hours  budesonide 160 MICROgram(s)/formoterol 4.5 MICROgram(s) Inhaler 2 two times a day  heparin   Injectable 3800 every 6 hours PRN  heparin  Infusion.  <Continuous>  influenza   Vaccine 0.5 once  simvastatin 20 at bedtime      SOCIAL HISTORY:     nonsmoker  no etoh  no drugs    FAMILY HISTORY:  twin sister had hodgkin's lymphoma      REVIEW OF SYSTEMS  [  ] ROS unobtainable because:    [X  ] All other systems negative except as noted below:	    Constitutional:  [ ] fever [ ] chills  [ ] weight loss  [ ] weakness  Skin:  [ x] rash [ ] phlebitis	  Eyes: [ ] icterus [ ] pain  [ ] discharge	  ENMT: [ ] sore throat  [ ] thrush [ ] ulcers [ ] exudates  Respiratory: [ ] dyspnea [ ] hemoptysis [ ] cough [ ] sputum	  Cardiovascular:  [ ] chest pain [ ] palpitations [ ] edema	  Gastrointestinal:  [ ] nausea [ ] vomiting [ ] diarrhea [ ] constipation [ ] pain	  Genitourinary:  [ ] dysuria [ ] frequency [ ] hematuria [ ] discharge [ ] flank pain  [ ] incontinence  Musculoskeletal:  [ ] myalgias [ ] arthralgias [ ] arthritis  [ X] back pain  Neurological:  [ ] headache [ ] seizures  [ ] confusion/altered mental status  Psychiatric:  [ ] anxiety [ ] depression	  Hematology/Lymphatics:  [ ] lymphadenopathy  Endocrine:  [ ] adrenal [ ] thyroid  Allergic/Immunologic:	 [ ] transplant [ ] seasonal    Vital Signs Last 24 Hrs  T(F): 98.2 (10-27-21 @ 19:00), Max: 102.2 (10-25-21 @ 14:37)    Vital Signs Last 24 Hrs  HR: 80 (10-27-21 @ 18:00) (70 - 90)  BP: 123/56 (10-27-21 @ 19:00) (99/39 - 142/81)  RR: 20 (10-27-21 @ 18:00)  SpO2: 97% (10-27-21 @ 19:00) (96% - 100%)  Wt(kg): --    PHYSICAL EXAM:  Constitutional: non-toxic, no distress  HEAD/EYES: anicteric, no conjunctival injection  ENT:  supple, no thrush  Cardiovascular:   normal S1, S2, no murmur, no edema  Respiratory:  clear BS bilaterally, no wheezes, no rales  GI:  soft, non-tender, normal bowel sounds  :  no jimenez, no CVA tenderness  Musculoskeletal:  no synovitis, normal ROM, right hip scar from old surgery   Neurologic: awake and alert, normal strength, no focal findings  Skin: erythematous patchy rash on the trunk/torso   Heme/Onc: no lymphadenopathy   Psychiatric:  awake, alert, appropriate mood          WBC Count: 7.83 K/uL (10-27 @ 05:14)  WBC Count: 11.80 K/uL (10-26 @ 00:33)  WBC Count: 11.94 K/uL (10-25 @ 14:38)                            13.7   7.83  )-----------( 178      ( 27 Oct 2021 05:14 )             42.0       10-27    139  |  112<H>  |  14  ----------------------------<  134<H>  4.4   |  23  |  0.68    Ca    8.3<L>      27 Oct 2021 03:41  Phos  1.2     10-27  Mg     2.4     10-27    TPro  5.1<L>  /  Alb  2.1<L>  /  TBili  0.2  /  DBili  x   /  AST  30  /  ALT  13  /  AlkPhos  57  10-27      Creatinine Trend: 0.68<--, 0.70<--, 1.00<--    Urinalysis (10.25.21 @ 18:40)    pH Urine: 5.0    Glucose Qualitative, Urine: Negative mg/dL    Blood, Urine: Small    Color: Yellow    Urine Appearance: Clear    Bilirubin: Negative    Ketone - Urine: Moderate    Specific Gravity: 1.020    Protein, Urine: 15 mg/dL    Urobilinogen: Negative mg/dL    Nitrite: Negative    Leukocyte Esterase Concentration: Negative        MICROBIOLOGY:    Culture - Urine (collected 10-26-21 @ 00:51)  Source: Clean Catch Clean Catch (Midstream)  Final Report (10-27-21 @ 15:20):    No growth    Culture - Blood (collected 10-25-21 @ 18:21)  Source: .Blood Blood-Peripheral  Preliminary Report (10-26-21 @ 19:02):    No growth to date.    Culture - Blood (collected 10-25-21 @ 18:21)  Source: .Blood Blood-Peripheral  Preliminary Report (10-26-21 @ 19:02):    No growth to date.    C-Reactive Protein, Serum: 145 (10-26)    Sedimentation Rate, Erythrocyte: 5 mm/hr (10.26.21 @ 00:33)      Procalcitonin, Serum: 0.26 (10-27-21 @ 02:35)      RADIOLOGY:  < from: CT Angio Abdomen and Pelvis w/ IV Cont (10.25.21 @ 22:08)   IMPRESSION:  1. No evidence of thoracic aortic dissection or aneurysm.  2. Mild bilateral lower lobe dependent air space opacity-atelectasis.  3. Minimal bilateral pleural effusions.    CT Angio Abdomen and Pelvis w/ IV Cont (10.25.21 @ 22:08)   IMPRESSION:    1. Right total hip arthroplasty creates artifact limiting images of the pelvis.  2. No evidence of abdominal aortic dissection.  3. Patent mesenteric, aortoiliac and renal vasculature.  4. Mild-moderate scattered colonic diverticulosis. No radiographic signs of  associated inflammation.  5. Small hiatal hernia.  6. Mild- moderate distal gastric wall thickening possibly related to gastritis.  7. No evidence of small bowel obstruction.    < from: MR Lumbar Spine w/ IV Cont (10.27.21 @ 11:36) >  IMPRESSION: Bone marrow edema and enhancement in the L1 vertebral body suspicious for osteomyelitis. Compression deformity of the superior L1 vertebral body. Peripherally enhancing epidural collection in the ventral and left lateral aspect of the spinal canal at the T12-L1 level extending superiorly along the posterior aspect of the T12 vertebral body measuring 1.8 x 0.8 x 2.9 cm, suspicious for epidural abscess. This contributes to spinal canal narrowing. No cord deformity or cord signal abnormality.  Alternatively, if there is no clinical suspicion for infection, findings could represent an acute compression fracture of the L1 vertebral body with enhancement. Finding in the ventral spinal canal at the T12 and L1 levels could represent a sequestered disc fragment.          MR Thoracic Spine w/wo IV Cont (10.27.21 @ 15:26)   IMPRESSION:    AGAIN NOTED IS THE ACUTE COMPRESSION DEFORMITY AT L1 VERTEBRAL BODY WITHOUT SIGNIFICANT RETROPULSION INTO THE SPINAL CANAL. SOFT TISSUE COMPONENT IN THE VENTRAL EPIDURAL SPACE EXTENDING SUPERIORLY TO THE LEFT SUBARTICULAR ZONE OF T11-T12 IS HIGHLY SUSPICIOUS FOR EXTRUDED DISC WITH SUPERIOR MIGRATION AND SEQUESTRATION.      THERE IS NO EVIDENCE OF SPONDYLODISCITIS IN THE CERVICAL OR THORACIC SPINE.

## 2021-10-27 NOTE — DIETITIAN INITIAL EVALUATION ADULT. - OTHER INFO
Pt's son was @ bedside when seen, son stated that pt had c/o low appetite due to pain.  Pt was getting ready to go to MRI when seen, NPO status noted.

## 2021-10-27 NOTE — CONSULT NOTE ADULT - ASSESSMENT
A/P :   Patient is a 76yFemale w/ L1 Osteomyelitis and small ventral epidural abscess at T12-L1.    -Clinical presentation and physical exam are not consistent w/ acute cord compression/cauda equina. Does not demonstrate red flag symptoms such as bowel/bladder incontinence, saddle anesthesia, significant acute weakness.     -ICU management appreciated  -Q2hr neuro-checks  -FU MRI C/T spine  -Plan for OR IF neurologic symptoms worsen. NPO with IVF after midnight tonight.  -Hold chemical DVT prophylaxis for possible OR. SCDs ok.   -Empiric abx. Recommend ID consult for targeted abx therapy.    -Multimodal analgesia - recommend low dose opioids, acetaminophen, muscle relaxant as tolerated  -All patient's questions answered. Patient understands and agrees w/ above plan.  -Imaging and clinical presentation discussed w/ Dr. Saunders who is aware and agrees w/ above plan.   A/P :   Patient is a 76yFemale w/ L1 Osteomyelitis and small ventral epidural abscess at T12-L1.    -Clinical presentation and physical exam are not consistent w/ acute cord compression/cauda equina. Does not demonstrate red flag symptoms such as bowel/bladder incontinence, saddle anesthesia, significant acute weakness.   -ICU management appreciated  -Q2hr neuro-checks  -FU MRI C/T spine w/con to rule out skip lesions   -Current plan for conservative mgmt, will continue to monitor over next 48 hours and plan for OR IF neurologic symptoms worsen.  -Will tentatively make NPO after midnight tonight.  -Please obtain CBC/CMP/Coags tomorrow morning  -Patient currently afebrile s/p 1 dose of rocephin, trend ESR/CRP q48 hours  -Patient currently only ThL for new onset afib, would ideally be off AC for possible surgery if feasible by cardiology/ICU  -Empiric abx. Recommend ID consult for targeted abx therapy.    -Recommend IR consultation for possible biopsy, although location may be unaccessible   -Multimodal analgesia - recommend low dose opioids, acetaminophen, muscle relaxant as tolerated  -All patient's questions answered. Patient understands and agrees w/ above plan.  -Imaging and clinical presentation discussed w/ Dr. Saunders who is aware and agrees w/ above plan.   A/P :   Patient is a 76yFemale w/ L1 Osteomyelitis and small ventral epidural abscess at T12-L1.    -Clinical presentation and physical exam are not consistent w/ acute cord compression/cauda equina. Does not demonstrate red flag symptoms such as bowel/bladder incontinence, saddle anesthesia, significant acute weakness.   -ICU management appreciated  -Q2hr neuro-checks  -FU MRI C/T spine w/con to rule out skip lesions   -Current plan for conservative mgmt, will continue to monitor over next 48 hours and plan for OR IF neurologic symptoms worsen.  -Will tentatively make NPO after midnight tonight.  -Please obtain CBC/CMP/Coags tomorrow morning  -Patient currently afebrile s/p 1 dose of rocephin, trend ESR/CRP q48 hours  -Patient currently only ThL for new onset afib, would ideally be off AC for possible surgery if feasible by cardiology/ICU  -Empiric abx. Recommend ID consult for targeted abx therapy. Currently receiving IV steroids for allergic reaction to abx. Please hold all further steroids in the setting of infection   -Recommend IR consultation for possible biopsy, although location may be unaccessible   -Multimodal analgesia - recommend low dose opioids, acetaminophen, muscle relaxant as tolerated  -All patient's questions answered. Patient understands and agrees w/ above plan.  -Imaging and clinical presentation discussed w/ Dr. Saunders who is aware and agrees w/ above plan.

## 2021-10-27 NOTE — PROGRESS NOTE ADULT - ASSESSMENT
76 year old female  w/ PMH of AAA repaired x2 years ago,  HLD, OA, asthma, anxiety presents to the ED BIBEMS for lower back pain x2  3 weeks. Found to have AF and now w/ concern for epidural abscess and OM on MRI.      Neuro  exam and mri not c/w cord compression  cont to monitor neuro exam  MRI w/ contrast of spine ordered  f/u Ortho/spine reccs    CVS  cont a/c for AF  rate controlled  trop tending down and no CP  Cardiology consult    ID  repeat cx, so far ngtd  start empiric abx- given allergies may need vanco/carbapenem  f/u ID consult    Skin  cont skin care as per nursing  rash improved  from past day with steroids

## 2021-10-27 NOTE — DIETITIAN INITIAL EVALUATION ADULT. - OTHER CALCULATIONS
wt. used for calculation of estimated needs: IBW= 47.6 kg Wt: 10/25, 64.9 kg      %IBW: 136%    UBW:  61.2 kg  % UBW: 106%, ? adm wt. accuracy

## 2021-10-27 NOTE — PROGRESS NOTE ADULT - ASSESSMENT
76 year old female      w/PMH of AAA repaired x2 years ago,  HLD, OA, asthma, anxiety      admitted with  for lower back pain x2 to  3 weeks., arrive d via  ems   Pt was  seen at [facility] yesterday for  back pain,  had negative workup, including cr scan / report unavailable  now,  and was discharged.  home  yesterday   per  pt,  her back pain worsened  and  has difficulty in getting out of  bed/ denies  leg weakness in er, pt with new  rapid AFib, with  rvr.  was seen   by icu and rejectedon iv amiodarone  drip ,     in  er/pt  given    total of 70 mg cardizem in er  and iv  fluids >  3.5  L     1. Afib, new  rate controlled  on heparin drip  cardiology on board.  +  Troponin trending down, no active CP, trending down.    2. Fevers   need  to r/o infectious  cause/  r/o sepsis  has  had   worsening  back pain for  weeks/ mri spine ordered. r/o osteo  pt ha s no leg weakness on  exam  cxr, normal,  follow blood/ urine   c/s.  esr/Crp   ID consult  MRI AGAIN NOTED IS THE ACUTE COMPRESSION DEFORMITY AT L1 VERTEBRAL BODY WITHOUT SIGNIFICANT RETROPULSION INTO THE SPINAL CANAL. SOFT TISSUE COMPONENT IN THE VENTRAL EPIDURAL SPACE EXTENDING SUPERIORLY TO THE LEFT SUBARTICULAR ZONE OF T11-T12 IS HIGHLY SUSPICIOUS FOR EXTRUDED DISC WITH SUPERIOR MIGRATION AND SEQUESTRATION.  consider decadron, ortho on board.    3. Asthma    on daily  5 mg prednisone   for years, pt   with  hypotension   4. HLD, on zocor    Management as per ICU team.

## 2021-10-27 NOTE — DIETITIAN INITIAL EVALUATION ADULT. - ORAL INTAKE PTA/DIET HISTORY
Pt reports depressed oral intake for few weeks PTA.  Pt lived c spouse, was doing own shopping/cooking prior to her illness.  Diet PTA: kosher, Low sodium, no spices.  Pt requested Kosher/Kosher style meals, requested whole wheat toast, yogurt, fruit for breakfast, fish, baked potato, salad, fresh fruit for lunch meals, Kosher meal for dinner c fresh fruit & Hot water c lemon for all meals.  Food & Nutrition Service made aware of pt's preferences.

## 2021-10-27 NOTE — DIETITIAN INITIAL EVALUATION ADULT. - CONTINUE CURRENT NUTRITION CARE PLAN
recommend advance diet when appropriate to regular + Ensure Enlive 1 x /day=375 calories, 20 grams protein (will provide Kosher/Kosher style as requested)

## 2021-10-27 NOTE — CONSULT NOTE ADULT - SUBJECTIVE AND OBJECTIVE BOX
Patient is a 76yFemale community ambulator without assistive devices who presents to [facility] ED w/ a c/o low back pain for 2-3 weeks. Patient was previously seen in [facility] ED 2days ago for similar complaints and DCd with a negative workup. Patient was admitted to [facility] ICU with fevers and new onset afib with RVR. Patient denies recent fall, PNA/UTI/illness, and IV drug use. Patient localizing pain to lumbar spine, denies radiation of pain elsewhere. States inability to ambulate 2/2 pain. Denies pain down legs, denies numbness/tingling/paresthesias/weakness, denies incontinence of bowel/bladder.  Denies having any other pain elsewhere. Denies any previous orthopaedic history. No other orthopaedic concerns at this time.         PAST MEDICAL & SURGICAL HISTORY:  Asthma    HLD (hyperlipidemia)    OA (osteoarthritis)  Right Hip    Anxiety disorder    History of abdominal aortic aneurysm (AAA)  Stent placed 3/22/19    S/P AAA repair  Stent Placed 3/22/19    S/P left knee surgery        Vital Signs Last 24 Hrs  T(C): 36.2 (27 Oct 2021 06:00), Max: 37.6 (26 Oct 2021 18:20)  T(F): 97.1 (27 Oct 2021 06:00), Max: 99.6 (26 Oct 2021 18:20)  HR: 89 (27 Oct 2021 10:00) (66 - 89)  BP: 128/59 (27 Oct 2021 09:00) (99/39 - 128/63)  BP(mean): 76 (27 Oct 2021 09:00) (56 - 98)  RR: 19 (27 Oct 2021 10:00) (13 - 32)  SpO2: 100% (27 Oct 2021 10:00) (87% - 100%)  I&O's Detail    26 Oct 2021 07:01  -  27 Oct 2021 07:00  --------------------------------------------------------  IN:    Amiodarone: 183.7 mL    Oral Fluid: 1240 mL  Total IN: 1423.7 mL    OUT:    Amiodarone: 0 mL    Voided (mL): 1100 mL  Total OUT: 1100 mL    Total NET: 323.7 mL          LABS:                        13.7   7.83  )-----------( 178      ( 27 Oct 2021 05:14 )             42.0     10    139  |  112<H>  |  14  ----------------------------<  134<H>  4.4   |  23  |  0.68    Ca    8.3<L>      27 Oct 2021 03:41  Phos  1.2     10-27  Mg     2.4     10-27    TPro  5.1<L>  /  Alb  2.1<L>  /  TBili  0.2  /  DBili  x   /  AST  30  /  ALT  13  /  AlkPhos  57  10-27    PT/INR - ( 25 Oct 2021 14:38 )   PT: 13.0 sec;   INR: 1.13 ratio         PTT - ( 25 Oct 2021 14:38 )  PTT:32.3 sec  Urinalysis Basic - ( 25 Oct 2021 18:40 )    Color: Yellow / Appearance: Clear / S.020 / pH: x  Gluc: x / Ketone: Moderate  / Bili: Negative / Urobili: Negative mg/dL   Blood: x / Protein: 15 mg/dL / Nitrite: Negative   Leuk Esterase: Negative / RBC: 0-2 /HPF / WBC 0-2   Sq Epi: x / Non Sq Epi: Occasional / Bacteria: Moderate        PHYSICAL EXAM:  General; Awake and alert, Oriented x 3  Spine: +TTP over spinous processes and paraspinal muscles at L spine. No palpable step off.     Able to actively SLR BL.   + Passive/Active rectal tone  No saddle anesthesia    Negative -release Test            Motor exam:        C5       C6       C7       C8       T1   R  5/5      5/5     5/5     5/5      5/5  L   5/5      5/5     5/5     5/5      5/5         L2        L3       L4       L5      S1  R  4/5      5/5     4/5     5/5    5/5  L   4/5     5/5      5/5     5/5    5/5    Sensory:  (0=absent, 1=impaired, 2=normal, NT=not testable)      C5    C6   C7   C8   T1         R   2     2      2     2      2  L    2     2      2     2      2        L2    L3   L4   L5   S1   R   2     2     2     2     2  L    2     2     2     2     2    Right Upper extremity:   Negative Hennessy  +Rad Pulse  Compartments soft and compressible    Left Upper extremity:   Negative Hennessy  +Rad Pulse  Compartments soft and compressible    Right Lower Extremity:   SILT L2-S1  Negative Clonus  Negative Babinski  +DP  Compartments soft and compressible           Left Lower Extremity:  SILT L2-S1  Negative Clonus   Negative Babinski  +DP  Compartments soft and compressible    Imaging:  MRI Lspine - L1 Osteomyelitis and small ventral epidural abscess at T12-L1.

## 2021-10-27 NOTE — DIETITIAN NUTRITION RISK NOTIFICATION - TREATMENT: THE FOLLOWING DIET HAS BEEN RECOMMENDED
Diet, NPO after Midnight:      NPO Start Date: 27-Oct-2021,   NPO Start Time: 23:59  Except Medications (10-27-21 @ 13:51) [Active]

## 2021-10-27 NOTE — DIETITIAN INITIAL EVALUATION ADULT. - PERTINENT LABORATORY DATA
10-27 Na139 mmol/L Glu 134 mg/dL<H> K+ 4.4 mmol/L Cr  0.68 mg/dL BUN 14 mg/dL 10-27 Phos 1.2 mg/dL<L> 10-27 Alb 2.1 g/dL<L>10-27 ALT 13 U/L AST 30 U/L Alkaline Phosphatase 57 U/L

## 2021-10-27 NOTE — PROGRESS NOTE ADULT - SUBJECTIVE AND OBJECTIVE BOX
HPI:   76 year old female  w/ PMH of AAA repaired x2 years ago,  HLD, OA, asthma, anxiety [facility]   Pt seen at [facility] yesterday for similar complaints, had negative workup, including cr scan / report unavailable  now,  and was discharged. home  yesterday  States her back pain worsened yesterday while laying on the table for her CT scan.  now  with worsening  pain,  and  has   difficulty in betting out if bed denies  leg weakness    Denies fever/chills, cough, SOB, CP, abdominal pain, paresthesia or N/V/D.      24 hr events:  HR remains normal  MRI concerning for abscess    ROS  Pt denies CP  denies SOB  denies abd pain  denies LE weakness  denies change incontinence (reports many years of stress incontinence)  denies sensation deficit    ## Labs:  CBC:                        13.7   7.83  )-----------( 178      ( 27 Oct 2021 05:14 )             42.0     Chem:  10-27    139  |  112<H>  |  14  ----------------------------<  134<H>  4.4   |  23  |  0.68    Ca    8.3<L>      27 Oct 2021 03:41  Phos  1.2     10-27  Mg     2.4     10-27    TPro  5.1<L>  /  Alb  2.1<L>  /  TBili  0.2  /  DBili  x   /  AST  30  /  ALT  13  /  AlkPhos  57  10-27    Coags:  PT/INR - ( 25 Oct 2021 14:38 )   PT: 13.0 sec;   INR: 1.13 ratio         PTT - ( 25 Oct 2021 14:38 )  PTT:32.3 sec  culture blood:  --  10-25 @ 18:21            culture sputum:     No growth to date.           culture urine:  -- 10-25 @ 18:21         ## Medications:      ALBUTerol    90 MICROgram(s) HFA Inhaler 2 Puff(s) Inhalation every 6 hours  budesonide 160 MICROgram(s)/formoterol 4.5 MICROgram(s) Inhaler 2 Puff(s) Inhalation two times a day  diphenhydrAMINE Injectable 25 milliGRAM(s) IV Push once  diphenhydrAMINE Injectable 25 milliGRAM(s) IV Push every 6 hours    methylPREDNISolone sodium succinate Injectable 40 milliGRAM(s) IV Push every 8 hours  simvastatin 20 milliGRAM(s) Oral at bedtime    enoxaparin Injectable 60 milliGRAM(s) SubCutaneous every 12 hours    ## Vitals:  T(C): 36.2 (10-27-21 @ 06:00), Max: 37.6 (10-26-21 @ 18:20)  HR: 89 (10-27-21 @ 10:00) (66 - 89)  BP: 128/59 (10-27-21 @ 09:00) (99/39 - 128/63)  BP(mean): 76 (10-27-21 @ 09:00) (56 - 98)  RR: 19 (10-27-21 @ 10:00) (13 - 32)  < from: MR Lumbar Spine w/ IV Cont (10.27.21 @ 11:36) >  EXAM:  MR SPINE LUMBAR IC                            *** ADDENDUM 10/27/2021  ***    Alternatively, if there is no clinical suspicion for infection, findings could represent an acute compression fracture of the L1 vertebral body with enhancement. Finding in the ventral spinal canal at the T12 and L1 levels could represent a sequestered disc fragment.    --- End of Report ---    *** END OF ADDENDUM 10/27/2021  ***      PROCEDURE DATE:  10/27/2021          INTERPRETATION:  CLINICAL INFORMATION: fevers/  back apin  r/o osteo. back pain/  fevers. .    TECHNIQUE: Multiplanar multisequence MR of the lumbar spine was performed with and without contrast. 8 mL Gadavist administered with 2 mL discarded.    COMPARISON: CT abdomen and pelvis 10/25/2021    FINDINGS:    Bone marrow edema and enhancement in the L1 vertebral body suspicious for osteomyelitis. Compression deformity of the superior L1 vertebral body. Peripherally enhancing epidural collection in the ventral and left lateral aspect of thespinal canal at the T12-L1 level extending superiorly along the posterior aspect of the T12 vertebral body measuring 1.8 x 0.8 x 2.9 cm, suspicious for epidural abscess. This contributes to spinal canal narrowing. No cord deformity or cord signal abnormality.    Lumbar alignment is maintained. Remaining vertebral body heights are within normal limits. Multilevel Schmorl's nodes. Multilevel intervertebral disc desiccation.    Evaluation of individual levels demonstrates:    L1-L2: Annular fissure.No significant spinal canal or neuroforaminal narrowing.    L2-L3: Disc bulge. Mild indentation of the thecal sac. No significant neuroforaminal narrowing.    L3-L4: Disc bulge. Bilateral facet arthrosis and ligamentum flavum hypertrophy. Moderate spinal canal narrowing. Mild bilateral neuroforaminal narrowing.    L4-L5: Disc bulge. Bilateral facet arthrosis and ligamentum flavum hypertrophy. Moderate spinal canal narrowing. Mild bilateral neuroforaminal narrowing.    L5-S1: Disc bulge. No significant spinal canal narrowing. Moderate bilateral neuroforaminal narrowing.    The conus is normal in position and morphology at the L1 level.    Redemonstration of abdominal aortic aneurysm with aortobiiliac stent graft.      IMPRESSION: Bone marrow edema and enhancement in the L1 vertebral body suspicious for osteomyelitis. Compression deformity of the superior L1 vertebral body. Peripherally enhancing epidural collection in the ventral and left lateral aspect of the spinal canal at the T12-L1 level extending superiorly along the posterior aspect of the T12 vertebral body measuring 1.8 x 0.8 x 2.9 cm, suspicious for epidural abscess. This contributes to spinal canal narrowing. No cord deformity or cord signal abnormality.    These findings were discussed with Dr. Muse at 10/27/2021 12:45 PM by Dr. Gurpreet Armas with read back confirmation.    --- End of Report ---    ***Please see the addendum at the top of this report. It may contain additional important information or changes.****        GURPREET ARMAS MD; Attending Radiologist  This document has been electronically signed. Oct 27 2021 12:47PM  Addend:GURPREET ARMAS MD; Attending Radiologist  This addendum was electronically signed on: Oct 27 2021  1:40PM.    < end of copied text >  SpO2: 100% (10-27-21 @ 10:00) (87% - 100%)  Wt(kg): --  Vent:   ABG:       10-26 @ 07:01  -  10-27 @ 07:00  --------------------------------------------------------  IN: 1423.7 mL / OUT: 1100 mL / NET: 323.7 mL      ## P/E:  Gen: lying comfortably in bed in no apparent distress  Mouth: mmm  Neck: no accessory muscle use  Lungs: b/l cta  Heart: s1s2 reg no murmur  Abd: +BS soft nontender  Ext: no edema  Neuro: aox3,   Skin: rash resolving on trunk, no mucosal lesions

## 2021-10-27 NOTE — PROGRESS NOTE ADULT - SUBJECTIVE AND OBJECTIVE BOX
INTERVAL HPI/OVERNIGHT EVENTS: Pt seen and examined at bedside.     76y  Vital Signs Last 24 Hrs  T(C): 36.2 (27 Oct 2021 06:00), Max: 37.6 (26 Oct 2021 18:20)  T(F): 97.1 (27 Oct 2021 06:00), Max: 99.6 (26 Oct 2021 18:20)  HR: 90 (27 Oct 2021 14:00) (70 - 90)  BP: 135/65 (27 Oct 2021 14:00) (99/39 - 135/65)  BP(mean): 82 (27 Oct 2021 14:00) (56 - 98)  RR: 24 (27 Oct 2021 14:00) (13 - 32)  SpO2: 100% (27 Oct 2021 14:) (87% - 100%)  I&O's Summary    26 Oct 2021 07:01  -  27 Oct 2021 07:00  --------------------------------------------------------  IN: 1423.7 mL / OUT: 1100 mL / NET: 323.7 mL    27 Oct 2021 07:01  -  27 Oct 2021 16:48  --------------------------------------------------------  IN: 850 mL / OUT: 200 mL / NET: 650 mL      MEDICATIONS  (STANDING):  ALBUTerol    90 MICROgram(s) HFA Inhaler 2 Puff(s) Inhalation every 6 hours  budesonide 160 MICROgram(s)/formoterol 4.5 MICROgram(s) Inhaler 2 Puff(s) Inhalation two times a day  chlorhexidine 2% Cloths 1 Application(s) Topical <User Schedule>  diphenhydrAMINE Injectable 25 milliGRAM(s) IV Push every 6 hours  heparin  Infusion.  Unit(s)/Hr (7.5 mL/Hr) IV Continuous <Continuous>  influenza   Vaccine 0.5 milliLiter(s) IntraMuscular once  lidocaine   4% Patch 1 Patch Transdermal every 24 hours  simvastatin 20 milliGRAM(s) Oral at bedtime    MEDICATIONS  (PRN):  heparin   Injectable 3800 Unit(s) IV Push every 6 hours PRN For aPTT less than 40    LABS:    trop                        13.7   7.83  )-----------( 178      ( 27 Oct 2021 05:14 )             42.0     10    139  |  112<H>  |  14  ----------------------------<  134<H>  4.4   |  23  |  0.68    Ca    8.3<L>      27 Oct 2021 03:41  Phos  1.2     10-27  Mg     2.4     10-27    TPro  5.1<L>  /  Alb  2.1<L>  /  TBili  0.2  /  DBili  x   /  AST  30  /  ALT  13  /  AlkPhos  57  10-27    PT/INR - ( 27 Oct 2021 14:30 )   PT: 15.0 sec;   INR: 1.31 ratio         PTT - ( 27 Oct 2021 14:30 )  PTT:37.3 sec  Urinalysis Basic - ( 25 Oct 2021 18:40 )    Color: Yellow / Appearance: Clear / S.020 / pH: x  Gluc: x / Ketone: Moderate  / Bili: Negative / Urobili: Negative mg/dL   Blood: x / Protein: 15 mg/dL / Nitrite: Negative   Leuk Esterase: Negative / RBC: 0-2 /HPF / WBC 0-2   Sq Epi: x / Non Sq Epi: Occasional / Bacteria: Moderate      CAPILLARY BLOOD GLUCOSE            Urinalysis Basic - ( 25 Oct 2021 18:40 )    Color: Yellow / Appearance: Clear / S.020 / pH: x  Gluc: x / Ketone: Moderate  / Bili: Negative / Urobili: Negative mg/dL   Blood: x / Protein: 15 mg/dL / Nitrite: Negative   Leuk Esterase: Negative / RBC: 0-2 /HPF / WBC 0-2   Sq Epi: x / Non Sq Epi: Occasional / Bacteria: Moderate        RADIOLOGY & ADDITIONAL TESTS:    Imaging Personally Reviewed:  [ ] YES  [ ] NO    Consultant(s) Notes Reviewed:  [x ] YES  [ ] NO    PHYSICAL EXAM:  GENERAL: NAD  NERVOUS SYSTEM:  Alert & Oriented X3  CHEST/LUNG: Clear to auscultation b/l.  HEART: Regular rate and rhythm; No murmurs, rubs, or gallops  ABDOMEN: Soft, Nontender, Nondistended; Bowel sounds present  EXTREMITIES:  No edema    A & P:        Care Discussed with Consultants/Other Providers [ x] YES  [ ] NO

## 2021-10-27 NOTE — CONSULT NOTE ADULT - ASSESSMENT
75 yo WF with h/o asthma, AAA repaired x2 years ago,  HLD, OA, and , anxiety  BIBEMS 2 to lower back pain x2-3 weeks. Pt seen at [facility] yesterday for similar complaints, had negative workup and was discharged. States her back pain worsened yesterday while laying on the table for her CT scan. States she has had back pain in the past but this is worse. Reports pain is to the point where she cannot get herself out of bed. In the ER pt found to be febrile 102.2 and in A fib with RVR with borderline low BP but not requiring pressors. Pt Rx'd with IVF, IV and Po Cardizem, IV Amio and then loaded with IV Amio. MRI today showed possible discitis with abscess. Reviewed MRI with Dr. Taylor and DDx includes a compression fracture with extruded disk with less likely being osteomyelitis or abscess.   2 sets of blood cultures negative.   She had a rash after getting ceftriaxone   also allergic to sulfa drugs   had fevers on admission but since resolved   UA negative   covid negative   CT A/P without evidence of infection   Transthoracic Echocardiogram no evidence of IE, no CHF, borderline pulm Hypertension   EKG initially with Afib with RVR  unclear where fevers are coming. With low esr and MRI findings this is less likely a discitis. Would hold antibiotics and repeat 2 sets of blood cultures       Fever/ rule out discitis/OM   Afib with RVR  NSTEMI     Plan:  #Fever/ rule out discitis/OM    monitor off antibiotics   blood cultures x2  follow up initial blood cultures sent until final   Ortho follow up   if develops high persist fevers, start vancomycin and meropenem  pain control   trend wbc       #Afib with RVR  management per ICU  cardiology consult  AC for afib if not contraindicated considering possible back/spine surgery     #NSTEMI   elevated troponin level   could be elevated in the setting of afib with rvr   possibly related to active infection/sepsis  starting to trend down   continue to monitor of arrythmia   cardiology consult     #Allergic reaction, initial encounter  likely had allergic reation to ceftriaxone  continue treatment for rash, pruritis including steroids  if antibiotics needed for gram negative coverage would consider either aztreonam or carbapenem        D/W Dr. Muse and Dr. Pancho Corral, DO  Infectious Disease Attending  Pager [phone]  After 5pm/weekends please call [phone] for all inquiries and new consults

## 2021-10-27 NOTE — DIETITIAN INITIAL EVALUATION ADULT. - PERTINENT MEDS FT
MEDICATIONS  (STANDING):  ALBUTerol    90 MICROgram(s) HFA Inhaler 2 Puff(s) Inhalation every 6 hours  budesonide 160 MICROgram(s)/formoterol 4.5 MICROgram(s) Inhaler 2 Puff(s) Inhalation two times a day  chlorhexidine 2% Cloths 1 Application(s) Topical <User Schedule>  diphenhydrAMINE Injectable 25 milliGRAM(s) IV Push every 6 hours  enoxaparin Injectable 60 milliGRAM(s) SubCutaneous every 12 hours  influenza   Vaccine 0.5 milliLiter(s) IntraMuscular once  lidocaine   4% Patch 1 Patch Transdermal every 24 hours  methylPREDNISolone sodium succinate Injectable 40 milliGRAM(s) IV Push every 8 hours  simvastatin 20 milliGRAM(s) Oral at bedtime    MEDICATIONS  (PRN):
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
## 2021-10-28 LAB
ANION GAP SERPL CALC-SCNC: 4 MMOL/L — LOW (ref 5–17)
APTT BLD: 63.8 SEC — HIGH (ref 27.5–35.5)
APTT BLD: 64 SEC — HIGH (ref 27.5–35.5)
BASOPHILS # BLD AUTO: 0.02 K/UL — SIGNIFICANT CHANGE UP (ref 0–0.2)
BASOPHILS NFR BLD AUTO: 0.2 % — SIGNIFICANT CHANGE UP (ref 0–2)
BUN SERPL-MCNC: 21 MG/DL — SIGNIFICANT CHANGE UP (ref 7–23)
CALCIUM SERPL-MCNC: 8.3 MG/DL — LOW (ref 8.5–10.1)
CHLORIDE SERPL-SCNC: 112 MMOL/L — HIGH (ref 96–108)
CO2 SERPL-SCNC: 27 MMOL/L — SIGNIFICANT CHANGE UP (ref 22–31)
CREAT SERPL-MCNC: 0.73 MG/DL — SIGNIFICANT CHANGE UP (ref 0.5–1.3)
CRP SERPL-MCNC: 47 MG/L — HIGH
CULTURE RESULTS: NO GROWTH — SIGNIFICANT CHANGE UP
EOSINOPHIL # BLD AUTO: 0 K/UL — SIGNIFICANT CHANGE UP (ref 0–0.5)
EOSINOPHIL NFR BLD AUTO: 0 % — SIGNIFICANT CHANGE UP (ref 0–6)
ERYTHROCYTE [SEDIMENTATION RATE] IN BLOOD: 11 MM/HR — SIGNIFICANT CHANGE UP (ref 0–20)
GLUCOSE SERPL-MCNC: 167 MG/DL — HIGH (ref 70–99)
HCT VFR BLD CALC: 38.1 % — SIGNIFICANT CHANGE UP (ref 34.5–45)
HGB BLD-MCNC: 12.5 G/DL — SIGNIFICANT CHANGE UP (ref 11.5–15.5)
IMM GRANULOCYTES NFR BLD AUTO: 0.5 % — SIGNIFICANT CHANGE UP (ref 0–1.5)
INR BLD: 1.13 RATIO — SIGNIFICANT CHANGE UP (ref 0.88–1.16)
LYMPHOCYTES # BLD AUTO: 0.9 K/UL — LOW (ref 1–3.3)
LYMPHOCYTES # BLD AUTO: 8.3 % — LOW (ref 13–44)
MAGNESIUM SERPL-MCNC: 2.5 MG/DL — SIGNIFICANT CHANGE UP (ref 1.6–2.6)
MCHC RBC-ENTMCNC: 27.5 PG — SIGNIFICANT CHANGE UP (ref 27–34)
MCHC RBC-ENTMCNC: 32.8 GM/DL — SIGNIFICANT CHANGE UP (ref 32–36)
MCV RBC AUTO: 83.7 FL — SIGNIFICANT CHANGE UP (ref 80–100)
MONOCYTES # BLD AUTO: 0.68 K/UL — SIGNIFICANT CHANGE UP (ref 0–0.9)
MONOCYTES NFR BLD AUTO: 6.3 % — SIGNIFICANT CHANGE UP (ref 2–14)
NEUTROPHILS # BLD AUTO: 9.22 K/UL — HIGH (ref 1.8–7.4)
NEUTROPHILS NFR BLD AUTO: 84.7 % — HIGH (ref 43–77)
NRBC # BLD: 0 /100 WBCS — SIGNIFICANT CHANGE UP (ref 0–0)
PHOSPHATE SERPL-MCNC: 2.3 MG/DL — LOW (ref 2.5–4.5)
PLATELET # BLD AUTO: 194 K/UL — SIGNIFICANT CHANGE UP (ref 150–400)
POTASSIUM SERPL-MCNC: 4 MMOL/L — SIGNIFICANT CHANGE UP (ref 3.5–5.3)
POTASSIUM SERPL-SCNC: 4 MMOL/L — SIGNIFICANT CHANGE UP (ref 3.5–5.3)
PROTHROM AB SERPL-ACNC: 13 SEC — SIGNIFICANT CHANGE UP (ref 10.6–13.6)
RBC # BLD: 4.55 M/UL — SIGNIFICANT CHANGE UP (ref 3.8–5.2)
RBC # FLD: 14.9 % — HIGH (ref 10.3–14.5)
SODIUM SERPL-SCNC: 143 MMOL/L — SIGNIFICANT CHANGE UP (ref 135–145)
SPECIMEN SOURCE: SIGNIFICANT CHANGE UP
WBC # BLD: 10.87 K/UL — HIGH (ref 3.8–10.5)
WBC # FLD AUTO: 10.87 K/UL — HIGH (ref 3.8–10.5)

## 2021-10-28 PROCEDURE — 99232 SBSQ HOSP IP/OBS MODERATE 35: CPT

## 2021-10-28 PROCEDURE — 99233 SBSQ HOSP IP/OBS HIGH 50: CPT

## 2021-10-28 PROCEDURE — 99221 1ST HOSP IP/OBS SF/LOW 40: CPT

## 2021-10-28 RX ORDER — POTASSIUM PHOSPHATE, MONOBASIC POTASSIUM PHOSPHATE, DIBASIC 236; 224 MG/ML; MG/ML
15 INJECTION, SOLUTION INTRAVENOUS ONCE
Refills: 0 | Status: COMPLETED | OUTPATIENT
Start: 2021-10-28 | End: 2021-10-28

## 2021-10-28 RX ORDER — METOPROLOL TARTRATE 50 MG
25 TABLET ORAL
Refills: 0 | Status: DISCONTINUED | OUTPATIENT
Start: 2021-10-28 | End: 2021-11-03

## 2021-10-28 RX ADMIN — HEPARIN SODIUM 750 UNIT(S)/HR: 5000 INJECTION INTRAVENOUS; SUBCUTANEOUS at 00:45

## 2021-10-28 RX ADMIN — LIDOCAINE 1 PATCH: 4 CREAM TOPICAL at 21:44

## 2021-10-28 RX ADMIN — CHLORHEXIDINE GLUCONATE 1 APPLICATION(S): 213 SOLUTION TOPICAL at 06:53

## 2021-10-28 RX ADMIN — ALBUTEROL 2 PUFF(S): 90 AEROSOL, METERED ORAL at 17:16

## 2021-10-28 RX ADMIN — ALBUTEROL 2 PUFF(S): 90 AEROSOL, METERED ORAL at 06:50

## 2021-10-28 RX ADMIN — Medication 25 MILLIGRAM(S): at 17:16

## 2021-10-28 RX ADMIN — Medication 40 MILLIGRAM(S): at 11:50

## 2021-10-28 RX ADMIN — BUDESONIDE AND FORMOTEROL FUMARATE DIHYDRATE 2 PUFF(S): 160; 4.5 AEROSOL RESPIRATORY (INHALATION) at 17:16

## 2021-10-28 RX ADMIN — LIDOCAINE 1 PATCH: 4 CREAM TOPICAL at 08:32

## 2021-10-28 RX ADMIN — LIDOCAINE 1 PATCH: 4 CREAM TOPICAL at 09:56

## 2021-10-28 RX ADMIN — ALBUTEROL 2 PUFF(S): 90 AEROSOL, METERED ORAL at 11:50

## 2021-10-28 RX ADMIN — POTASSIUM PHOSPHATE, MONOBASIC POTASSIUM PHOSPHATE, DIBASIC 62.5 MILLIMOLE(S): 236; 224 INJECTION, SOLUTION INTRAVENOUS at 06:49

## 2021-10-28 RX ADMIN — ALBUTEROL 2 PUFF(S): 90 AEROSOL, METERED ORAL at 00:00

## 2021-10-28 RX ADMIN — HEPARIN SODIUM 750 UNIT(S)/HR: 5000 INJECTION INTRAVENOUS; SUBCUTANEOUS at 08:34

## 2021-10-28 RX ADMIN — SIMVASTATIN 20 MILLIGRAM(S): 20 TABLET, FILM COATED ORAL at 21:41

## 2021-10-28 RX ADMIN — BUDESONIDE AND FORMOTEROL FUMARATE DIHYDRATE 2 PUFF(S): 160; 4.5 AEROSOL RESPIRATORY (INHALATION) at 06:49

## 2021-10-28 NOTE — PROVIDER CONTACT NOTE (EICU) - SITUATION
asked by primary team for assistance with orders. evaluated by primary team and pt stable for downgrade to regular floor.

## 2021-10-28 NOTE — PROGRESS NOTE ADULT - ASSESSMENT
75 yo WF with h/o asthma, AAA repaired x2 years ago,  HLD, OA, and , anxiety  BIBEMS 2 to lower back pain x2-3 weeks. Pt seen at [facility] yesterday for similar complaints, had negative workup and was discharged. States her back pain worsened yesterday while laying on the table for her CT scan. States she has had back pain in the past but this is worse. Reports pain is to the point where she cannot get herself out of bed. In the ER pt found to be febrile 102.2 and in A fib with RVR with borderline low BP but not requiring pressors. Pt Rx'd with IVF, IV and Po Cardizem, IV Amio and then loaded with IV Amio. MRI today showed possible discitis with abscess. Reviewed MRI with Dr. Taylor and DDx includes a compression fracture with extruded disk with less likely being osteomyelitis or abscess.   2 sets of blood cultures negative.   She had a rash after getting ceftriaxone   also allergic to sulfa drugs   had fevers on admission but since resolved   UA negative   covid negative   CT A/P without evidence of infection   Transthoracic Echocardiogram no evidence of IE, no CHF, borderline pulm Hypertension   EKG initially with Afib with RVR  unclear where fevers are coming. With low esr and MRI findings this is less likely a discitis. Would hold antibiotics and repeat 2 sets of blood cultures     10/28: downgraded to floors, negative blood cultures, mri yesterday showed acute compression fracture, no osteomyelitis, no fevers and she reports less pain     Fever/ rule out discitis/OM   Afib with RVR  NSTEMI     Plan:  #Fever/ rule out discitis/OM    monitor off antibiotics   f/u blood cultures x2 until final   follow up initial blood cultures sent until final   Ortho follow up appreciated, no definitive plan as of now for surgery    if develops high persist fevers, start vancomycin and meropenem  pain control   trend wbc       #Afib with RVR  management per ICU  cardiology consult  AC for afib if not contraindicated considering possible back/spine surgery     #NSTEMI   elevated troponin level   could be elevated in the setting of afib with rvr   possibly related to active infection/sepsis  starting to trend down   continue to monitor of arrythmia   cardiology consult     #Allergic reaction, initial encounter  likely had allergic reaction to ceftriaxone  continue treatment for rash, pruritis including steroids  if antibiotics needed for gram negative coverage would consider either aztreonam or carbapenem        Patricio Corarl DO  Infectious Disease Attending  Pager [phone]  After 5pm/weekends please call [phone] for all inquiries and new consults

## 2021-10-28 NOTE — PROGRESS NOTE ADULT - SUBJECTIVE AND OBJECTIVE BOX
Orthopedics      Patient seen and examined at bedside. Having some back pain unchanged, however denies lower extremity numbness/tingling. No fevers ovrnight. No n/v. No acute events overnight.    Vital Signs Last 24 Hrs  T(C): 36 (10-28-21 @ 04:00), Max: 36.9 (10-27-21 @ 12:00)  T(F): 96.8 (10-28-21 @ 04:00), Max: 98.4 (10-27-21 @ 12:00)  HR: 84 (10-28-21 @ 05:00) (68 - 90)  BP: 121/66 (10-28-21 @ 05:00) (101/48 - 142/81)  BP(mean): 80 (10-28-21 @ 05:00) (57 - 111)  RR: 22 (10-28-21 @ 05:00) (16 - 24)  SpO2: 96% (10-28-21 @ 05:00) (92% - 100%)                        12.5   10.87 )-----------( 194      ( 28 Oct 2021 00:17 )             38.1     28 Oct 2021 00:17    143    |  112    |  21     ----------------------------<  167    4.0     |  27     |  0.73     Ca    8.3        28 Oct 2021 00:17  Phos  2.3       28 Oct 2021 00:17  Mg     2.5       28 Oct 2021 00:17    TPro  5.1    /  Alb  2.1    /  TBili  0.2    /  DBili  x      /  AST  30     /  ALT  13     /  AlkPhos  57     27 Oct 2021 03:41    PT/INR - ( 28 Oct 2021 00:17 )   PT: 13.0 sec;   INR: 1.13 ratio         PTT - ( 28 Oct 2021 00:17 )  PTT:63.8 sec    PHYSICAL EXAM:  General; Awake and alert, Oriented x 3  Spine: +TTP over spinous processes and paraspinal muscles at L spine. No palpable step off.     Able to actively SLR BL.   + Passive/Active rectal tone  No saddle anesthesia    Negative -release Test            Motor exam:        C5       C6       C7       C8       T1   R  5/5      5/5     5/5     5/5      5/5  L   5/5      5/5     5/5     5/5      5/5         L2        L3       L4       L5      S1  R  5/5      5/5     5/5     5/5    5/5  L   5/5     5/5      5/5     5/5    5/5    Sensory:  (0=absent, 1=impaired, 2=normal, NT=not testable)      C5    C6   C7   C8   T1         R   2     2      2     2      2  L    2     2      2     2      2        L2    L3   L4   L5   S1   R   2     2     2     2     2  L    2     2     2     2     2      Patient is a 76yFemale now with suspected L1 VCF with T12-L1 sequestered HNP versus less likely OM w/epidural abscess    -Clinical presentation and physical exam are not consistent w/ acute cord compression/cauda equina. Does not demonstrate red flag symptoms such as bowel/bladder incontinence, saddle anesthesia, significant acute weakness.   -Will discuss with attending regarding new MRI findings, if treating as sequestered disc on imposed VCF, will require bracing and likely IV steroids   -Anticipate continued conservative mgmt   -ICU management appreciated  -Continue Q2hr neuro-checks for now, will likely downgrade today  -Patient currently afebrile, trend ESR/CRP q48 hours  -ID Following, lower suspicion for infection, holding abx unless patient spikes fevers  -Multimodal analgesia - recommend low dose opioids, acetaminophen, muscle relaxant as tolerated  -All patient's questions answered. Patient understands and agrees w/ above plan.  -Imaging and clinical presentation discussed w/ Dr. Saunders who is aware and agrees w/ above plan.

## 2021-10-28 NOTE — PROGRESS NOTE ADULT - ASSESSMENT
76 year old female  w/ PMH of AAA repaired x2 years ago,  HLD, OA, asthma, anxiety presents to the ED BIBEMS for lower back pain x2  3 weeks. Found to have AF and now has MRi that concerned for fracture (prior read was initially concerned for abscess/OM but was changed)  Plan for transfer      Neuro  exam and mri not c/w cord compression  cont to monitor neuro exam  MRI w/ contrast of spine ordered not c/w infection/OM  f/u Ortho/spine reccs    CVS  cont a/c for AF  rate controlled  trop tending down and no CP  Cardiology consult appreciated    ID  repeat cx, so far ngtd  hold empiric abx   f/u ID reccs     Skin  cont skin care as per nursing  rash improved from past day with steroids (    Pulm  pt feels slightly inc SOB  cont extra dose of pred 40mg x2 days   cont symbicort and prn alb

## 2021-10-28 NOTE — CONSULT NOTE ADULT - ASSESSMENT
76 year old female w/ PMH of AAA repaired x2 years ago,  HLD, OA, asthma, anxiety presents to the ED BIBEMS for lower back pain x2  3 weeks.   Presented to the ER 10/25/21 with worsening  pain, had difficulty in getting out if bed .    In the ER pt found to be febrile 102.2 and in A fib with RVR with borderline low BP but not requiring pressors.  Pt Rx'd with IVF, IV and Po Cardizem, IV Amio and then loaded with IV Amio.   Elevated troponins 2/2 demand ischemia 2/2 A fib with RVR -> on Heparin gtt.       MRI  Bone marrow edema and enhancement in the L1 vertebral body suspicious for osteomyelitis. Compression deformity of the superior L1 vertebral body. Peripherally enhancing epidural collection in the ventral and left lateral aspect of the spinal canal at the T12-L1 level extending superiorly along the posterior aspect of the T12 vertebral body measuring 1.8 x 0.8 x 2.9 cm, suspicious for epidural abscess. This contributes to spinal canal narrowing. No cord deformity or cord signal abnormality.    Per ortho concern for epidural abscess and OM on MRI.   ID Following, lower suspicion for infection, holding abx unless patient spikes fevers.  Overnight, pt converted back to NSR with HRs 80-90s    -ID/osteo management as per primary team  -cont hep gtt for AC for afib... CHADSVASC score 5; can hold heparin if sx necessary in the future  -would add metoprolol to promote sinus rhythm   -can d/c amio

## 2021-10-28 NOTE — CONSULT NOTE ADULT - SUBJECTIVE AND OBJECTIVE BOX
CARDIOLOGY CONSULT NOTE    Patient is a 76y Female with a known history of :    HPI:  76 year old female w/ PMH of AAA repaired x2 years ago,  HLD, OA, asthma, anxiety presents to the ED BIBEMS for lower back pain x2  3 weeks.   Presented to the ER 10/25/21 with worsening  pain, had difficulty in getting out if bed .    In the ER pt found to be febrile 102.2 and in A fib with RVR with borderline low BP but not requiring pressors.  Pt Rx'd with IVF, IV and Po Cardizem, IV Amio and then loaded with IV Amio.   Elevated troponins 2/2 demand ischemia 2/2 A fib with RVR -> on Heparin gtt.       MRI  Bone marrow edema and enhancement in the L1 vertebral body suspicious for osteomyelitis. Compression deformity of the superior L1 vertebral body. Peripherally enhancing epidural collection in the ventral and left lateral aspect of the spinal canal at the T12-L1 level extending superiorly along the posterior aspect of the T12 vertebral body measuring 1.8 x 0.8 x 2.9 cm, suspicious for epidural abscess. This contributes to spinal canal narrowing. No cord deformity or cord signal abnormality.    Per ortho concern for epidural abscess and OM on MRI.   ID Following, lower suspicion for infection, holding abx unless patient spikes fevers.  Overnight, pt converted back to NSR with HRs 80-90s      REVIEW OF SYSTEMS:  CONSTITUTIONAL: No fever, weight loss, or fatigue  EYES: No eye pain, visual disturbances, or discharge  ENMT:  No difficulty hearing, tinnitus, vertigo; No sinus or throat pain  NECK: No pain or stiffness  BREASTS: No pain, masses, or nipple discharge  RESPIRATORY: No cough, wheezing, chills or hemoptysis; No shortness of breath  CARDIOVASCULAR: No chest pain, palpitations, dizziness, or leg swelling  GASTROINTESTINAL: No abdominal or epigastric pain. No nausea, vomiting, or hematemesis; No diarrhea or constipation. No melena or hematochezia.  GENITOURINARY: No dysuria, frequency, hematuria, or incontinence  NEUROLOGICAL: No headaches, memory loss, loss of strength, numbness, or tremors  SKIN: No itching, burning, rashes, or lesions   LYMPH NODES: No enlarged glands  ENDOCRINE: No heat or cold intolerance; No hair loss  MUSCULOSKELETAL: No joint pain or swelling; No muscle, back, or extremity pain  PSYCHIATRIC: No depression, anxiety, mood swings, or difficulty sleeping  HEME/LYMPH: No easy bruising, or bleeding gums  ALLERGY AND IMMUNOLOGIC: No hives or eczema    MEDICATIONS  (STANDING):  ALBUTerol    90 MICROgram(s) HFA Inhaler 2 Puff(s) Inhalation every 6 hours  budesonide 160 MICROgram(s)/formoterol 4.5 MICROgram(s) Inhaler 2 Puff(s) Inhalation two times a day  heparin  Infusion.  Unit(s)/Hr (7.5 mL/Hr) IV Continuous <Continuous>  influenza   Vaccine 0.5 milliLiter(s) IntraMuscular once  lidocaine   4% Patch 1 Patch Transdermal every 24 hours  predniSONE   Tablet 40 milliGRAM(s) Oral daily  simvastatin 20 milliGRAM(s) Oral at bedtime    MEDICATIONS  (PRN):  heparin   Injectable 3800 Unit(s) IV Push every 6 hours PRN For aPTT less than 40      ALLERGIES: aspirin (Short breath)  ceftriaxone (Rash (Moderate); Pruritus)  penicillin (Stomach Upset)  Sulfacetamide Sodium-Sulfur (Stomach Upset)      FAMILY HISTORY:      PHYSICAL EXAMINATION:  -----------------------------  T(C): 36.8 (10-28-21 @ 15:35), Max: 36.8 (10-27-21 @ 19:00)  HR: 76 (10-28-21 @ 15:35) (68 - 88)  BP: 133/69 (10-28-21 @ 15:35) (96/80 - 142/81)  RR: 18 (10-28-21 @ 15:35) (10 - 25)  SpO2: 96% (10-28-21 @ 15:35) (92% - 100%)      Constitutional: well developed, normal appearance, well groomed, well nourished, no deformities and no acute distress.   Eyes: the conjunctiva exhibited no abnormalities and the eyelids demonstrated no xanthelasmas.   HEENT: normal oral mucosa, no oral pallor and no oral cyanosis.   Neck: normal jugular venous A waves present, normal jugular venous V waves present and no jugular venous greenwood A waves.   Pulmonary: no respiratory distress, normal respiratory rhythm and effort, no accessory muscle use and lungs were clear to auscultation bilaterally.   Cardiovascular: heart rate and rhythm were normal, normal S1 and S2 and no murmur, gallop, rub, heave or thrill are present.   Abdomen: soft, non-tender, no hepato-splenomegaly and no abdominal mass palpated.   Musculoskeletal: the gait could not be assessed..   Extremities: no clubbing of the fingernails, no localized cyanosis, no petechial hemorrhages and no ischemic changes.   Skin: normal skin color and pigmentation, no rash, no venous stasis, no skin lesions, no skin ulcer and no xanthoma was observed.   Psychiatric: oriented to person, place, and time, the affect was normal, the mood was normal and not feeling anxious.       LABS:   --------  10-28    143  |  112<H>  |  21  ----------------------------<  167<H>  4.0   |  27  |  0.73    Ca    8.3<L>      28 Oct 2021 00:17  Phos  2.3     10-28  Mg     2.5     10-28    TPro  5.1<L>  /  Alb  2.1<L>  /  TBili  0.2  /  DBili  x   /  AST  30  /  ALT  13  /  AlkPhos  57  10-27                         12.5   10.87 )-----------( 194      ( 28 Oct 2021 00:17 )             38.1     PT/INR - ( 28 Oct 2021 00:17 )   PT: 13.0 sec;   INR: 1.13 ratio         PTT - ( 28 Oct 2021 07:56 )  PTT:64.0 sec        Culture Results:   No growth (10-27 @ 09:13)      RADIOLOGY:  -----------------    ECG:  Afib 178bpm -> sinus 94bpm, LVH, anterolateral TWIs    < from: TTE Echo Complete w/o Contrast w/ Doppler (10.26.21 @ 08:15) >  Summary:   1. Left ventricular ejection fraction, by visual estimation, is 60 to 65%.   2. Normal global left ventricular systolic function.   3. Normal left ventricular internal cavity size.   4. Normal left ventricular size and wall thicknesses, with normal systolic and diastolic function.   5. Normal right ventricular size and function.   6. Normal left atrial size.   7. Normal right atrial size.   8. There is no evidence of pericardial effusion.   9. Mild mitral valve regurgitation.  10. Structurally normal mitral valve, with normalleaflet excursion.  11. Mild tricuspid regurgitation.  12. Sclerotic aortic valve with normal opening.  13. Estimated pulmonary artery systolic pressure is 36.7 mmHg assuming a right atrial pressure of 5 mmHg, which is consistent with borderline pulmonary hypertension.    Isacc Sam MD FACC, FASE, FACP  Electronically signed on 10/26/2021 at 2:14:01 PM    < end of copied text >

## 2021-10-28 NOTE — PROGRESS NOTE ADULT - SUBJECTIVE AND OBJECTIVE BOX
HPI:   76 year old female  w/ PMH of AAA repaired x2 years ago,  HLD, OA, asthma, anxiety presents to the ED [facility] for lower back pain x2  3 weeks.   Pt seen at [facility] yesterday for similar complaints, had negative workup, including cr scan / report unavailable  now,  and was discharged. home  yesterday  States her back pain worsened yesterday while laying on the table for her CT scan.  now  with worsening  pain,  and  has   difficulty in betting out if bed denies  leg weakness    Denies fever/chills, cough, SOB, CP, abdominal pain, paresthesia or N/V/D.      24 hr events:  HR remains normal      ROS  Pt denies CP  denies SOB  denies abd pain  denies LE weakness  denies change incontinence (reports many years of stress incontinence)  denies sensation deficit    ## Labs:   CBC Full  -  ( 28 Oct 2021 00:17 )  WBC Count : 10.87 K/uL  RBC Count : 4.55 M/uL  Hemoglobin : 12.5 g/dL  Hematocrit : 38.1 %  Platelet Count - Automated : 194 K/uL  Mean Cell Volume : 83.7 fl  Mean Cell Hemoglobin : 27.5 pg  Mean Cell Hemoglobin Concentration : 32.8 gm/dL  Auto Neutrophil # : 9.22 K/uL  Auto Lymphocyte # : 0.90 K/uL  Auto Monocyte # : 0.68 K/uL  Auto Eosinophil # : 0.00 K/uL  Auto Basophil # : 0.02 K/uL  Auto Neutrophil % : 84.7 %  Auto Lymphocyte % : 8.3 %  Auto Monocyte % : 6.3 %  Auto Eosinophil % : 0.0 %  Auto Basophil % : 0.2 %        10-28    143  |  112<H>  |  21  ----------------------------<  167<H>  4.0   |  27  |  0.73    Ca    8.3<L>      28 Oct 2021 00:17  Phos  2.3     10-28  Mg     2.5     10-28    TPro  5.1<L>  /  Alb  2.1<L>  /  TBili  0.2  /  DBili  x   /  AST  30  /  ALT  13  /  AlkPhos  57  10-27      MEDICATIONS  (STANDING):  ALBUTerol    90 MICROgram(s) HFA Inhaler 2 Puff(s) Inhalation every 6 hours  budesonide 160 MICROgram(s)/formoterol 4.5 MICROgram(s) Inhaler 2 Puff(s) Inhalation two times a day  heparin  Infusion.  Unit(s)/Hr (7.5 mL/Hr) IV Continuous <Continuous>  influenza   Vaccine 0.5 milliLiter(s) IntraMuscular once  lidocaine   4% Patch 1 Patch Transdermal every 24 hours  metoprolol tartrate 25 milliGRAM(s) Oral two times a day  predniSONE   Tablet 40 milliGRAM(s) Oral daily  simvastatin 20 milliGRAM(s) Oral at bedtime         EXAM:  MR SPINE LUMBAR IC                            *** ADDENDUM 10/27/2021  ***    Alternatively, if there is no clinical suspicion for infection, findings could represent an acute compression fracture of the L1 vertebral body with enhancement. Finding in the ventral spinal canal at the T12 and L1 levels could represent a sequestered disc fragment.    --- End of Report ---    *** END OF ADDENDUM 10/27/2021  ***      PROCEDURE DATE:  10/27/2021          INTERPRETATION:  CLINICAL INFORMATION: fevers/  back apin  r/o osteo. back pain/  fevers. .    TECHNIQUE: Multiplanar multisequence MR of the lumbar spine was performed with and without contrast. 8 mL Gadavist administered with 2 mL discarded.    COMPARISON: CT abdomen and pelvis 10/25/2021    FINDINGS:    Bone marrow edema and enhancement in the L1 vertebral body suspicious for osteomyelitis. Compression deformity of the superior L1 vertebral body. Peripherally enhancing epidural collection in the ventral and left lateral aspect of thespinal canal at the T12-L1 level extending superiorly along the posterior aspect of the T12 vertebral body measuring 1.8 x 0.8 x 2.9 cm, suspicious for epidural abscess. This contributes to spinal canal narrowing. No cord deformity or cord signal abnormality.    Lumbar alignment is maintained. Remaining vertebral body heights are within normal limits. Multilevel Schmorl's nodes. Multilevel intervertebral disc desiccation.    Evaluation of individual levels demonstrates:    L1-L2: Annular fissure.No significant spinal canal or neuroforaminal narrowing.    L2-L3: Disc bulge. Mild indentation of the thecal sac. No significant neuroforaminal narrowing.    L3-L4: Disc bulge. Bilateral facet arthrosis and ligamentum flavum hypertrophy. Moderate spinal canal narrowing. Mild bilateral neuroforaminal narrowing.    L4-L5: Disc bulge. Bilateral facet arthrosis and ligamentum flavum hypertrophy. Moderate spinal canal narrowing. Mild bilateral neuroforaminal narrowing.    L5-S1: Disc bulge. No significant spinal canal narrowing. Moderate bilateral neuroforaminal narrowing.    The conus is normal in position and morphology at the L1 level.    Redemonstration of abdominal aortic aneurysm with aortobiiliac stent graft.      IMPRESSION: Bone marrow edema and enhancement in the L1 vertebral body suspicious for osteomyelitis. Compression deformity of the superior L1 vertebral body. Peripherally enhancing epidural collection in the ventral and left lateral aspect of the spinal canal at the T12-L1 level extending superiorly along the posterior aspect of the T12 vertebral body measuring 1.8 x 0.8 x 2.9 cm, suspicious for epidural abscess. This contributes to spinal canal narrowing. No cord deformity or cord signal abnormality.    These findings were discussed with Dr. Muse at 10/27/2021 12:45 PM by Dr. Gurpreet Armas with read back confirmation.    --- End of Report ---    ***Please see the addendum at the top of this report. It may contain additional important information or changes.****        GURPREET ARMAS MD; Attending Radiologist  This document has been electronically signed. Oct 27 2021 12:47PM  Addend:GURPREET ARMAS MD; Attending Radiologist  This addendum was electronically signed on: Oct 27 2021  1:40PM.    < end of copied text >      ICU Vital Signs Last 24 Hrs  T(C): 36.8 (28 Oct 2021 15:35), Max: 36.8 (27 Oct 2021 19:00)  T(F): 98.2 (28 Oct 2021 15:35), Max: 98.2 (27 Oct 2021 19:00)  HR: 76 (28 Oct 2021 15:35) (68 - 88)  BP: 133/69 (28 Oct 2021 15:35) (96/80 - 142/81)  BP(mean): 71 (28 Oct 2021 15:00) (57 - 124)  ABP: --  ABP(mean): --  RR: 18 (28 Oct 2021 15:35) (10 - 25)  SpO2: 96% (28 Oct 2021 15:35) (92% - 100%)      ## P/E:  Gen: lying comfortably in bed in no apparent distress  Mouth: mmm  Neck: no accessory muscle use  Lungs: b/l cta  Heart: s1s2 reg no murmur  Abd: +BS soft nontender  Ext: no edema  Neuro: aox3,   Skin: rash resolving on trunk, no mucosal lesions

## 2021-10-28 NOTE — PROGRESS NOTE ADULT - SUBJECTIVE AND OBJECTIVE BOX
MAYTE PEREIRA  MRN-[id number]      Follow Up:  rule out osteomyelitis     Interval History: patient seen and examined. blood cultures negative and afebrile, she reports much less pain in her back, no fevers or chills, no numbness or tingling or weakness.    ROS:    [ ] Unobtainable because:  [X ] All other systems negative except as noted above        Allergies  aspirin (Short breath)  ceftriaxone (Rash (Moderate); Pruritus)  penicillin (Stomach Upset)  Sulfacetamide Sodium-Sulfur (Stomach Upset)        ANTIMICROBIALS:      OTHER MEDS:  budesonide 160 MICROgram(s)/formoterol 4.5 MICROgram(s) Inhaler 2 Puff(s) Inhalation two times a day  heparin   Injectable 3800 Unit(s) IV Push every 6 hours PRN  heparin  Infusion.  Unit(s)/Hr IV Continuous <Continuous>  lidocaine   4% Patch 1 Patch Transdermal every 24 hours  metoprolol tartrate 25 milliGRAM(s) Oral two times a day  predniSONE   Tablet 40 milliGRAM(s) Oral daily  simvastatin 20 milliGRAM(s) Oral at bedtime      Physical Exam:  Vital Signs Last 24 Hrs  T(C): 36.8 (28 Oct 2021 15:35), Max: 36.8 (28 Oct 2021 15:35)  T(F): 98.2 (28 Oct 2021 15:35), Max: 98.2 (28 Oct 2021 15:35)  HR: 76 (28 Oct 2021 15:35) (68 - 88)  BP: 133/69 (28 Oct 2021 15:35) (96/80 - 141/72)  BP(mean): 71 (28 Oct 2021 15:00) (60 - 124)  RR: 18 (28 Oct 2021 15:35) (10 - 25)  SpO2: 96% (28 Oct 2021 15:35) (92% - 100%)    General:    NAD,  non toxic  Head: atraumatic, normocephalic  Eye: normal sclera and conjunctiva  ENT:    no oral lesions, neck supple  Cardio:     regular S1, S2,  no murmur  Respiratory:    clear b/l,    no wheezing  abd:     soft,   BS +,   no tenderness  :   no CVAT,  no suprapubic tenderness,   no  jimenez  Musculoskeletal:   no joint swelling,   no edema  vascular: no central lines, +PIV   Skin:    resolving rash on torso  Neurologic:     no focal deficit  psych: normal affect    WBC Count: 10.87 K/uL (10-28 @ 00:17)  WBC Count: 7.83 K/uL (10-27 @ 05:14)  WBC Count: 11.80 K/uL (10-26 @ 00:33)  WBC Count: 11.94 K/uL (10-25 @ 14:38)                            12.5   10.87 )-----------( 194      ( 28 Oct 2021 00:17 )             38.1       10-28    143  |  112<H>  |  21  ----------------------------<  167<H>  4.0   |  27  |  0.73    Ca    8.3<L>      28 Oct 2021 00:17  Phos  2.3     10-28  Mg     2.5     10-28    TPro  5.1<L>  /  Alb  2.1<L>  /  TBili  0.2  /  DBili  x   /  AST  30  /  ALT  13  /  AlkPhos  57  10-27          Creatinine Trend: 0.73<--, 0.68<--, 0.70<--, 1.00<--      MICROBIOLOGY:  v  .Blood Blood  10-27-21   No growth to date.  --  --      Clean Catch Clean Catch (Midstream)  10-27-21   No growth  --  --      Clean Catch Clean Catch (Midstream)  10-26-21   No growth  --  --      .Blood Blood-Peripheral  10-25-21   No growth to date.  --  --        C-Reactive Protein, Serum: 47 (10-28)  C-Reactive Protein, Serum: 145 (10-26)        Procalcitonin, Serum: 0.26 (10-27-21 @ 02:35)      RADIOLOGY:  < from: MR Thoracic Spine w/wo IV Cont (10.27.21 @ 15:26) >    IMPRESSION:    AGAIN NOTED IS THE ACUTE COMPRESSION DEFORMITY AT L1 VERTEBRAL BODY WITHOUT SIGNIFICANT RETROPULSION INTO THE SPINAL CANAL. SOFT TISSUE COMPONENT IN THE VENTRAL EPIDURAL SPACE EXTENDING SUPERIORLY TO THE LEFT SUBARTICULAR ZONE OF T11-T12 IS HIGHLY SUSPICIOUS FOR EXTRUDED DISC WITH SUPERIOR MIGRATION AND SEQUESTRATION.

## 2021-10-28 NOTE — CHART NOTE - NSCHARTNOTEFT_GEN_A_CORE
76 year old female  w/ PMH of AAA repaired x2 years ago,  HLD, OA, asthma, anxiety presents to the ED [facility] for lower back pain x2  3 weeks.  Pt seen at [facility] yesterday for similar complaints, had negative workup, including ct scan / report unavailable  now, and was discharged  home yesterday  She reported her back pain worsened yesterday while laying on the table for her CT scan.  Presented today to ER 10/25/21 with worsening  pain, had difficulty in betting out if bed .  Denies leg weakness.  fever/chills, cough, SOB, CP, abdominal pain, paresthesia or N/V/D.   In the ER pt found to be febrile 102.2 and in A fib with RVR with borderline low BP but not requiring pressors. Pt Rx'd with IVF, IV and Po Cardizem, IV Amio and then loaded with IV Amio. Elevated troponins probable demand ischemia  2 to A fib with RVR Heparin gtt.   Febrile but source of infection not clear.   Lower back pain seen by ortho spine for close observation and neuro checks q2 hours.IMPRESSION: Bone marrow edema and enhancement in the L1 vertebral body suspicious for osteomyelitis. Compression deformity of the superior L1 vertebral body. Peripherally enhancing epidural collection in the ventral and left lateral aspect of the spinal canal at the T12-L1 level extending superiorly along the posterior aspect of the T12 vertebral body measuring 1.8 x 0.8 x 2.9 cm, suspicious for epidural abscess. This contributes to spinal canal narrowing. No cord deformity or cord signal abnormality.    These findings were discussed with Dr. Muse at 10/27/2021 12:45 PM by Dr. Gurpreet Donahue with read back confirmation.*** ADDENDUM 10/27/2021  ***    Alternatively, if there is no clinical suspicion for infection, findings could represent an acute compression fracture of the L1 vertebral body with enhancement. Finding in the ventral spinal canal at the T12 and L1 levels could represent a sequestered disc fragment.    --- End of Report ---    *** END OF ADDENDUM 10/27/2021  ***Found to have AF and now w/ concern for epidural abscess and OM on MRI.exam and mri not c/w cord compressionMRI w/ contrast of spine ordered  f/u Ortho/spine reccscont a/c for AF  rate controlled  trop tending down and no CPCardiology consult 76 year old female  w/ PMH of AAA repaired x2 years ago,  HLD, OA, asthma, anxiety presents to the ED [facility] for lower back pain x2  3 weeks.  Pt seen at [facility] yesterday for similar complaints, had negative workup, including ct scan / report unavailable  now, and was discharged  home yesterday  She reported her back pain worsened yesterday while laying on the table for her CT scan.  Presented today to ER 10/25/21 with worsening  pain, had difficulty in betting out if bed .  Denies leg weakness.  fever/chills, cough, SOB, CP, abdominal pain, paresthesia or N/V/D.   In the ER pt found to be febrile 102.2 and in A fib with RVR with borderline low BP but not requiring pressors. Pt Rx'd with IVF, IV and Po Cardizem, IV Amio and then loaded with IV Amio. Elevated troponins probable demand ischemia  2 to A fib with RVR Heparin gtt.   Febrile but source of infection not clear.   Lower back pain seen by ortho spine for close observation and neuro checks q2 hours        IMPRESSION: Bone marrow edema and enhancement in the L1 vertebral body suspicious for osteomyelitis. Compression deformity of the superior L1 vertebral body. Peripherally enhancing epidural collection in the ventral and left lateral aspect of the spinal canal at the T12-L1 level extending superiorly along the posterior aspect of the T12 vertebral body measuring 1.8 x 0.8 x 2.9 cm, suspicious for epidural abscess. This contributes to spinal canal narrowing. No cord deformity or cord signal abnormality.    These findings were discussed with Dr. Muse at 10/27/2021 12:45 PM by Dr. Gurpreet Donahue with read back confirmation.*** ADDENDUM 10/27/2021  ***    Alternatively, if there is no clinical suspicion for infection, findings could represent an acute compression fracture of the L1 vertebral body with enhancement. Finding in the ventral spinal canal at the T12 and L1 levels could represent a sequestered disc fragment.    --- End of Report ---    *** END OF ADDENDUM 10/27/2021  ***Found to have AF and now w/ concern for epidural abscess and OM on MRI. exam and mri not c/w cord compressionMRI w/ contrast of spine ordered  f/u Ortho/spine reccscont a/c for AF  rate controlled  trop tending down and no CPCardiology consult 76 year old female  w/ PMH of AAA repaired x2 years ago,  HLD, OA, asthma, anxiety presents to the ED [facility] for lower back pain x2  3 weeks.  Pt seen at [facility] yesterday for similar complaints, had negative workup, including ct scan / report unavailable  now, and was discharged  home yesterday  She reported her back pain worsened yesterday while laying on the table for her CT scan.  Presented today to ER 10/25/21 with worsening  pain, had difficulty in betting out if bed .  Denies leg weakness.  fever/chills, cough, SOB, CP, abdominal pain, paresthesia or N/V/D.   In the ER pt found to be febrile 102.2 and in A fib with RVR with borderline low BP but not requiring pressors. Pt Rx'd with IVF, IV and Po Cardizem, IV Amio and then loaded with IV Amio. Elevated troponins probable demand ischemia  2 to A fib with RVR Heparin gtt.   Febrile but source of infection not clear.   Lower back pain seen by ortho spine for close observation and neuro checks q2 hours    MRI  IMPRESSION: Bone marrow edema and enhancement in the L1 vertebral body suspicious for osteomyelitis. Compression deformity of the superior L1 vertebral body. Peripherally enhancing epidural collection in the ventral and left lateral aspect of the spinal canal at the T12-L1 level extending superiorly along the posterior aspect of the T12 vertebral body measuring 1.8 x 0.8 x 2.9 cm, suspicious for epidural abscess. This contributes to spinal canal narrowing. No cord deformity or cord signal abnormality.  ** ADDENDUM 10/27/2021  ***  Alternatively, if there is no clinical suspicion for infection, findings could represent an acute compression fracture of the L1 vertebral body with enhancement. Finding in the ventral spinal canal at the T12 and L1 levels could represent a sequestered disc fragment.  --- End of Report ---    Per ortho concern for epidural abscess and OM on MRI. exam and mri not c/w cord compression MRI w/ contrast of spine ordered. will need ESR and cpk q 48 hours- (ordered).   Continue to follow spine recommendations cont a/c for AF patient rate controlled. Troponin tending down and no CP,  Cardiology consulted.  ID Following, lower suspicion for infection, holding abx unless patient spikes fevers. Pain management for analgesia recommend low dose opioids, acetaminophen, muscle relaxant as tolerated. Patient seen and examined by Ortho this am and discussed changing neuro checks to q 4 hours as patient remains stable and able to transfer to  medicine service. Patient seen by ICU attending    Report given to Dr. Payne hospitalist service  [facility]  critical care

## 2021-10-29 LAB
ALBUMIN SERPL ELPH-MCNC: 2.4 G/DL — LOW (ref 3.3–5)
ALP SERPL-CCNC: 66 U/L — SIGNIFICANT CHANGE UP (ref 40–120)
ALT FLD-CCNC: 16 U/L — SIGNIFICANT CHANGE UP (ref 12–78)
ANION GAP SERPL CALC-SCNC: 6 MMOL/L — SIGNIFICANT CHANGE UP (ref 5–17)
APTT BLD: 70.4 SEC — HIGH (ref 27.5–35.5)
AST SERPL-CCNC: 29 U/L — SIGNIFICANT CHANGE UP (ref 15–37)
BASOPHILS # BLD AUTO: 0.03 K/UL — SIGNIFICANT CHANGE UP (ref 0–0.2)
BASOPHILS NFR BLD AUTO: 0.2 % — SIGNIFICANT CHANGE UP (ref 0–2)
BILIRUB SERPL-MCNC: 0.2 MG/DL — SIGNIFICANT CHANGE UP (ref 0.2–1.2)
BUN SERPL-MCNC: 27 MG/DL — HIGH (ref 7–23)
CALCIUM SERPL-MCNC: 8.7 MG/DL — SIGNIFICANT CHANGE UP (ref 8.5–10.1)
CHLORIDE SERPL-SCNC: 107 MMOL/L — SIGNIFICANT CHANGE UP (ref 96–108)
CK SERPL-CCNC: 39 U/L — SIGNIFICANT CHANGE UP (ref 26–192)
CO2 SERPL-SCNC: 27 MMOL/L — SIGNIFICANT CHANGE UP (ref 22–31)
CREAT SERPL-MCNC: 0.55 MG/DL — SIGNIFICANT CHANGE UP (ref 0.5–1.3)
EOSINOPHIL # BLD AUTO: 0.03 K/UL — SIGNIFICANT CHANGE UP (ref 0–0.5)
EOSINOPHIL NFR BLD AUTO: 0.2 % — SIGNIFICANT CHANGE UP (ref 0–6)
ERYTHROCYTE [SEDIMENTATION RATE] IN BLOOD: 6 MM/HR — SIGNIFICANT CHANGE UP (ref 0–20)
ERYTHROCYTE [SEDIMENTATION RATE] IN BLOOD: 7 MM/HR — SIGNIFICANT CHANGE UP (ref 0–20)
GLUCOSE SERPL-MCNC: 94 MG/DL — SIGNIFICANT CHANGE UP (ref 70–99)
HCT VFR BLD CALC: 39.3 % — SIGNIFICANT CHANGE UP (ref 34.5–45)
HGB BLD-MCNC: 12.6 G/DL — SIGNIFICANT CHANGE UP (ref 11.5–15.5)
IMM GRANULOCYTES NFR BLD AUTO: 1 % — SIGNIFICANT CHANGE UP (ref 0–1.5)
LYMPHOCYTES # BLD AUTO: 1.8 K/UL — SIGNIFICANT CHANGE UP (ref 1–3.3)
LYMPHOCYTES # BLD AUTO: 12.6 % — LOW (ref 13–44)
MAGNESIUM SERPL-MCNC: 2.6 MG/DL — SIGNIFICANT CHANGE UP (ref 1.6–2.6)
MCHC RBC-ENTMCNC: 27.4 PG — SIGNIFICANT CHANGE UP (ref 27–34)
MCHC RBC-ENTMCNC: 32.1 GM/DL — SIGNIFICANT CHANGE UP (ref 32–36)
MCV RBC AUTO: 85.4 FL — SIGNIFICANT CHANGE UP (ref 80–100)
MONOCYTES # BLD AUTO: 0.99 K/UL — HIGH (ref 0–0.9)
MONOCYTES NFR BLD AUTO: 6.9 % — SIGNIFICANT CHANGE UP (ref 2–14)
NEUTROPHILS # BLD AUTO: 11.28 K/UL — HIGH (ref 1.8–7.4)
NEUTROPHILS NFR BLD AUTO: 79.1 % — HIGH (ref 43–77)
NRBC # BLD: 0 /100 WBCS — SIGNIFICANT CHANGE UP (ref 0–0)
PHOSPHATE SERPL-MCNC: 2.6 MG/DL — SIGNIFICANT CHANGE UP (ref 2.5–4.5)
PLATELET # BLD AUTO: 210 K/UL — SIGNIFICANT CHANGE UP (ref 150–400)
POTASSIUM SERPL-MCNC: 4 MMOL/L — SIGNIFICANT CHANGE UP (ref 3.5–5.3)
POTASSIUM SERPL-SCNC: 4 MMOL/L — SIGNIFICANT CHANGE UP (ref 3.5–5.3)
PROT SERPL-MCNC: 5.3 GM/DL — LOW (ref 6–8.3)
RBC # BLD: 4.6 M/UL — SIGNIFICANT CHANGE UP (ref 3.8–5.2)
RBC # FLD: 15.4 % — HIGH (ref 10.3–14.5)
SODIUM SERPL-SCNC: 140 MMOL/L — SIGNIFICANT CHANGE UP (ref 135–145)
WBC # BLD: 14.27 K/UL — HIGH (ref 3.8–10.5)
WBC # FLD AUTO: 14.27 K/UL — HIGH (ref 3.8–10.5)

## 2021-10-29 PROCEDURE — 99232 SBSQ HOSP IP/OBS MODERATE 35: CPT

## 2021-10-29 PROCEDURE — 99233 SBSQ HOSP IP/OBS HIGH 50: CPT

## 2021-10-29 RX ORDER — APIXABAN 2.5 MG/1
5 TABLET, FILM COATED ORAL
Refills: 0 | Status: DISCONTINUED | OUTPATIENT
Start: 2021-10-29 | End: 2021-11-03

## 2021-10-29 RX ADMIN — LIDOCAINE 1 PATCH: 4 CREAM TOPICAL at 06:19

## 2021-10-29 RX ADMIN — Medication 25 MILLIGRAM(S): at 06:19

## 2021-10-29 RX ADMIN — BUDESONIDE AND FORMOTEROL FUMARATE DIHYDRATE 2 PUFF(S): 160; 4.5 AEROSOL RESPIRATORY (INHALATION) at 18:05

## 2021-10-29 RX ADMIN — SIMVASTATIN 20 MILLIGRAM(S): 20 TABLET, FILM COATED ORAL at 21:28

## 2021-10-29 RX ADMIN — LIDOCAINE 1 PATCH: 4 CREAM TOPICAL at 12:15

## 2021-10-29 RX ADMIN — APIXABAN 5 MILLIGRAM(S): 2.5 TABLET, FILM COATED ORAL at 18:04

## 2021-10-29 RX ADMIN — BUDESONIDE AND FORMOTEROL FUMARATE DIHYDRATE 2 PUFF(S): 160; 4.5 AEROSOL RESPIRATORY (INHALATION) at 06:21

## 2021-10-29 RX ADMIN — Medication 25 MILLIGRAM(S): at 18:05

## 2021-10-29 RX ADMIN — LIDOCAINE 1 PATCH: 4 CREAM TOPICAL at 21:27

## 2021-10-29 RX ADMIN — Medication 40 MILLIGRAM(S): at 06:18

## 2021-10-29 RX ADMIN — HEPARIN SODIUM 750 UNIT(S)/HR: 5000 INJECTION INTRAVENOUS; SUBCUTANEOUS at 09:17

## 2021-10-29 NOTE — PROGRESS NOTE ADULT - ASSESSMENT
77 yo WF with h/o asthma, AAA repaired x2 years ago,  HLD, OA, and , anxiety  BIBEMS 2 to lower back pain x2-3 weeks. Pt seen at [facility] yesterday for similar complaints, had negative workup and was discharged. States her back pain worsened yesterday while laying on the table for her CT scan. States she has had back pain in the past but this is worse. Reports pain is to the point where she cannot get herself out of bed. In the ER pt found to be febrile 102.2 and in A fib with RVR with borderline low BP but not requiring pressors. Pt Rx'd with IVF, IV and Po Cardizem, IV Amio and then loaded with IV Amio. MRI today showed possible discitis with abscess. Reviewed MRI with Dr. Taylor and DDx includes a compression fracture with extruded disk with less likely being osteomyelitis or abscess.   2 sets of blood cultures negative.   She had a rash after getting ceftriaxone   also allergic to sulfa drugs   had fevers on admission but since resolved   UA negative   covid negative   CT A/P without evidence of infection   Transthoracic Echocardiogram no evidence of IE, no CHF, borderline pulm Hypertension   EKG initially with Afib with RVR  unclear where fevers are coming. With low esr and MRI findings this is less likely a discitis. Would hold antibiotics and repeat 2 sets of blood cultures     10/28: downgraded to floors, negative blood cultures, mri yesterday showed acute compression fracture, no osteomyelitis, no fevers and she reports less pain   10/29: improving, no fevers, on RA, WBC increased 14.27, Cr and LFTs ok, all UC and BC with no growth to date, continue to observe off antibiotics.     Fever/ rule out discitis/OM   Afib with RVR  NSTEMI     Plan:  #Fever/ rule out discitis/OM   continue to monitor off antibiotics   follow all culture data   Ortho follow up appreciated - no intervention at this time   if develops high persistent fevers, start vancomycin and meropenem  pain control   trend wbc       #Afib with RVR  management per cardiology  cardiology consult appreciated     #NSTEMI   elevated troponin level   could be elevated in the setting of afib with rvr   possibly related to active infection/sepsis  starting to trend down   continue to monitor of arrythmia   cardiology consult appreciated     #Allergic reaction, initial encounter  likely had allergic reaction to ceftriaxone  continue treatment for rash, pruritis including steroids  if antibiotics needed for gram negative coverage would consider either aztreonam or carbapenem   77 yo WF with h/o asthma, AAA repaired x2 years ago,  HLD, OA, and , anxiety  BIBEMS 2 to lower back pain x2-3 weeks. Pt seen at [facility] yesterday for similar complaints, had negative workup and was discharged. States her back pain worsened yesterday while laying on the table for her CT scan. States she has had back pain in the past but this is worse. Reports pain is to the point where she cannot get herself out of bed. In the ER pt found to be febrile 102.2 and in A fib with RVR with borderline low BP but not requiring pressors. Pt Rx'd with IVF, IV and Po Cardizem, IV Amio and then loaded with IV Amio. MRI today showed possible discitis with abscess. Reviewed MRI with Dr. Taylor and DDx includes a compression fracture with extruded disk with less likely being osteomyelitis or abscess.   2 sets of blood cultures negative.   She had a rash after getting ceftriaxone   also allergic to sulfa drugs   had fevers on admission but since resolved   UA negative   covid negative   CT A/P without evidence of infection   Transthoracic Echocardiogram no evidence of IE, no CHF, borderline pulm Hypertension   EKG initially with Afib with RVR  unclear where fevers are coming. With low esr and MRI findings this is less likely a discitis. Would hold antibiotics and repeat 2 sets of blood cultures     10/28: downgraded to floors, negative blood cultures, mri yesterday showed acute compression fracture, no osteomyelitis, no fevers and she reports less pain   10/29: improving, no fevers, on RA, WBC increased 14.27, Cr and LFTs ok, all UC and BC with no growth to date, continue to observe off antibiotics.     Fever/ rule out discitis/OM   Afib with RVR  NSTEMI     Plan:  #Fever/ rule out discitis/OM   continue to monitor off antibiotics   follow all culture data   Ortho follow up appreciated - no intervention at this time   if develops high persistent fevers, start vancomycin and meropenem  pain control   trend wbc   please arrange for physical therapy    #Leukocytosis  rise in wbc likely from steroids   elevated wbc can also be from allergic reaction   continue to monitor off antibiotics   trend wbc       #Afib with RVR  management per cardiology  cardiology consult appreciated     #NSTEMI   elevated troponin level   could be elevated in the setting of afib with rvr   possibly related to active infection/sepsis  starting to trend down   continue to monitor of arrythmia   cardiology consult appreciated     #Allergic reaction, initial encounter  likely had allergic reaction to ceftriaxone  continue treatment for rash, pruritis including steroids  if antibiotics needed for gram negative coverage would consider either aztreonam or carbapenem

## 2021-10-29 NOTE — PROGRESS NOTE ADULT - ATTENDING COMMENTS
AAA repair, AF with RVR, epidural abscess, on AC, statin, bb.  signing off, call back prn.
agree with above plan   needs PT eval   dc planning   hold antibiotics   consider discontinuation of steroids   pain control     no ID contraindication to discharge  will sign off. Please call with questions  Dr. Hurley will be covering this weekend. To reach him, please call [phone]     Patricio Corral DO  Infectious Disease Attending  Pager [phone]  After 5pm/weekends please call [phone] for all inquiries and new consults

## 2021-10-29 NOTE — PROGRESS NOTE ADULT - ASSESSMENT
75 yo WF with h/o asthma, AAA repaired x2 years ago,  HLD, OA, and , anxiety  BIBEMS 2 to lower back pain x2-3 weeks. Pt seen at [facility] yesterday for similar complaints, had negative workup and was discharged. States her back pain worsened while laying on the table for her CT scan. States she has had back pain in the past but this is worse. Reports pain is to the point where she cannot get herself out of bed. In the ER pt found to be febrile 102.2 and in A fib with RVR with borderline low BP but not requiring pressors. Pt Rx'd with IVF, IV and Po Cardizem, IV Amio and then loaded with IV Amio. Patient now downgraded from the ICU.  MRI today showed possible discitis with abscess. Reviewed MRI with Dr. Taylor and DDx includes a compression fracture with extruded disk with less likely being osteomyelitis or abscess.     1. Lower back pain sec [id number] fracture  MRI: AGAIN NOTED IS THE ACUTE COMPRESSION DEFORMITY AT L1 VERTEBRAL BODY WITHOUT SIGNIFICANT RETROPULSION INTO THE SPINAL CANAL. SOFT TISSUE COMPONENT IN THE VENTRAL EPIDURAL SPACE EXTENDING SUPERIORLY TO THE LEFT SUBARTICULAR ZONE OF T11-T12 IS HIGHLY SUSPICIOUS FOR EXTRUDED DISC WITH SUPERIOR MIGRATION AND SEQUESTRATION.  less likely OM/abscess  ID on board, not on antibiotics  blood cx NGTD  If develops high persistent fevers, start vanco and ole as per ID  Ortho on board, no plan for surgery.  c/w PO steroids.  pain control  PT eval  Please wear TLSO brace on ambulation with PT.    2. AFib  Rate controlled with metoprolol  on heparin drip change to Eliquis.    3. HLD  c/w simvastatin    4. Leucocytosis  most likely sec to steroids.  monitor wbc.           77 yo WF with h/o asthma, AAA repaired x2 years ago,  HLD, OA, and , anxiety  BIBEMS 2 to lower back pain x2-3 weeks. Pt seen at [facility] yesterday for similar complaints, had negative workup and was discharged. States her back pain worsened while laying on the table for her CT scan. States she has had back pain in the past but this is worse. Reports pain is to the point where she cannot get herself out of bed. In the ER pt found to be febrile 102.2 and in A fib with RVR with borderline low BP but not requiring pressors. Pt Rx'd with IVF, IV and Po Cardizem, IV Amio and then loaded with IV Amio. Patient now downgraded from the ICU.  MRI today showed possible discitis with abscess. Reviewed MRI with Dr. Taylor and DDx includes a compression fracture with extruded disk with less likely being osteomyelitis or abscess.     1. Lower back pain sec [id number] fracture  MRI: AGAIN NOTED IS THE ACUTE COMPRESSION DEFORMITY AT L1 VERTEBRAL BODY WITHOUT SIGNIFICANT RETROPULSION INTO THE SPINAL CANAL. SOFT TISSUE COMPONENT IN THE VENTRAL EPIDURAL SPACE EXTENDING SUPERIORLY TO THE LEFT SUBARTICULAR ZONE OF T11-T12 IS HIGHLY SUSPICIOUS FOR EXTRUDED DISC WITH SUPERIOR MIGRATION AND SEQUESTRATION.  less likely OM/abscess  ID on board, not on antibiotics  blood cx NGTD  If develops high persistent fevers, start vanco and ole as per ID  Ortho on board, no plan for surgery.  c/w PO steroids.  pain control  PT eval  Please wear TLSO brace on ambulation with PT.    2. AFib  Rate controlled with metoprolol  on heparin drip change to Eliquis.  ECHO WNL    3. HLD  c/w simvastatin    4. Leucocytosis  most likely sec to steroids.  monitor wbc.

## 2021-10-29 NOTE — PROGRESS NOTE ADULT - SUBJECTIVE AND OBJECTIVE BOX
INTERVAL HPI/OVERNIGHT EVENTS: Pt seen and examined at bedside. States her pain is controlled. No events overnight.    76y  Vital Signs Last 24 Hrs  T(C): 36.4 (29 Oct 2021 12:18), Max: 36.8 (28 Oct 2021 15:35)  T(F): 97.6 (29 Oct 2021 12:18), Max: 98.2 (28 Oct 2021 15:35)  HR: 65 (29 Oct 2021 12:18) (63 - 76)  BP: 121/71 (29 Oct 2021 12:18) (105/60 - 142/79)  BP(mean): 71 (28 Oct 2021 15:00) (71 - 77)  RR: 18 (29 Oct 2021 12:18) (17 - 22)  SpO2: 95% (29 Oct 2021 12:18) (95% - 96%)  I&O's Summary    28 Oct 2021 07:01  -  29 Oct 2021 07:00  --------------------------------------------------------  IN: 400 mL / OUT: 0 mL / NET: 400 mL      MEDICATIONS  (STANDING):  apixaban 5 milliGRAM(s) Oral two times a day  budesonide 160 MICROgram(s)/formoterol 4.5 MICROgram(s) Inhaler 2 Puff(s) Inhalation two times a day  heparin  Infusion.  Unit(s)/Hr (7.5 mL/Hr) IV Continuous <Continuous>  lidocaine   4% Patch 1 Patch Transdermal every 24 hours  metoprolol tartrate 25 milliGRAM(s) Oral two times a day  predniSONE   Tablet 40 milliGRAM(s) Oral daily  simvastatin 20 milliGRAM(s) Oral at bedtime    MEDICATIONS  (PRN):  heparin   Injectable 3800 Unit(s) IV Push every 6 hours PRN For aPTT less than 40    LABS:    trop                        12.6   14.27 )-----------( 210      ( 29 Oct 2021 08:55 )             39.3     10-29    140  |  107  |  27<H>  ----------------------------<  94  4.0   |  27  |  0.55    Ca    8.7      29 Oct 2021 08:55  Phos  2.6     10-29  Mg     2.6     10-29    TPro  5.3<L>  /  Alb  2.4<L>  /  TBili  0.2  /  DBili  x   /  AST  29  /  ALT  16  /  AlkPhos  66  10-29    PT/INR - ( 28 Oct 2021 00:17 )   PT: 13.0 sec;   INR: 1.13 ratio         PTT - ( 29 Oct 2021 08:55 )  PTT:70.4 sec    CAPILLARY BLOOD GLUCOSE              REVIEW OF SYSTEMS:  CONSTITUTIONAL: No fever, weight loss, or fatigue  RESPIRATORY: No cough, wheezing, chills or hemoptysis; No shortness of breath  CARDIOVASCULAR: No chest pain, palpitations, dizziness, or leg swelling  GASTROINTESTINAL: No abdominal or epigastric pain. No nausea, vomiting, or hematemesis; No diarrhea or constipation. No melena or hematochezia.  GENITOURINARY: No dysuria, frequency, hematuria, or incontinence  NEUROLOGICAL: No headaches, memory loss, loss of strength, numbness, or tremors  MUSCULOSKELETAL: No joint pain or swelling; No muscle, back, or extremity pain      RADIOLOGY & ADDITIONAL TESTS:    Imaging Personally Reviewed:  [ ] YES  [ ] NO    Consultant(s) Notes Reviewed:  [x ] YES  [ ] NO    PHYSICAL EXAM:  GENERAL: NAD  NERVOUS SYSTEM:  Alert & Oriented X3, moves all extremities.  CHEST/LUNG: Clear to auscultation b/l.  HEART: Irregular rate and rhythm; No murmurs, rubs, or gallops  ABDOMEN: Soft, Nontender, Nondistended; Bowel sounds present  EXTREMITIES:  2+ Peripheral Pulses, No clubbing, cyanosis, or edema      A & P:        Care Discussed with Consultants/Other Providers [ x] YES  [ ] NO

## 2021-10-29 NOTE — PROGRESS NOTE ADULT - ASSESSMENT
76 year old female w/ PMH of AAA repaired x2 years ago,  HLD, OA, asthma, anxiety presents to the ED BIBEMS for lower back pain x2  3 weeks.   Presented to the ER 10/25/21 with worsening  pain, had difficulty in getting out if bed .    In the ER pt found to be febrile 102.2 and in A fib with RVR with borderline low BP but not requiring pressors.  Pt Rx'd with IVF, IV and Po Cardizem, IV Amio and then loaded with IV Amio.   Elevated troponins 2/2 demand ischemia 2/2 A fib with RVR -> on Heparin gtt.       MRI  Bone marrow edema and enhancement in the L1 vertebral body suspicious for osteomyelitis. Compression deformity of the superior L1 vertebral body. Peripherally enhancing epidural collection in the ventral and left lateral aspect of the spinal canal at the T12-L1 level extending superiorly along the posterior aspect of the T12 vertebral body measuring 1.8 x 0.8 x 2.9 cm, suspicious for epidural abscess. This contributes to spinal canal narrowing. No cord deformity or cord signal abnormality.    Per ortho concern for epidural abscess and OM on MRI.   ID Following, lower suspicion for infection, holding abx unless patient spikes fevers.  Overnight, pt converted back to NSR with HRs 80-90s    -ID/osteo management as per primary team  -cont hep gtt for AC for afib... CHADSVASC score 5; can hold heparin if sx necessary in the future  -would add metoprolol to promote sinus rhythm   -cont Statin  -will sign off, please call if have questions.

## 2021-10-29 NOTE — PROGRESS NOTE ADULT - SUBJECTIVE AND OBJECTIVE BOX
Patient seen and examined at bedside. Pain well controlled with medication. Patient denies any numbness, tingling, weakness, or any other orthopaedic complaint.       PHYSICAL EXAM:  General; Awake and alert, Oriented x 3  Spine: +TTP over spinous processes and paraspinal muscles at L spine. No palpable step off.     Able to actively SLR BL.   + Passive/Active rectal tone  No saddle anesthesia    Negative -release Test            Motor exam:        C5       C6       C7       C8       T1   R  5/5      5/5     5/5     5/5      5/5  L   5/5      5/5     5/5     5/5      5/5         L2        L3       L4       L5      S1  R  5/5      5/5     5/5     5/5    5/5  L   5/5     5/5      5/5     5/5    5/5    Sensory:  (0=absent, 1=impaired, 2=normal, NT=not testable)      C5    C6   C7   C8   T1         R   2     2      2     2      2  L    2     2      2     2      2        L2    L3   L4   L5   S1   R   2     2     2     2     2  L    2     2     2     2     2      Patient is a 76yFemale now with suspected L1 VCF with T12-L1 sequestered HNP    -Clinical presentation and physical exam are not consistent w/ acute cord compression/cauda equina. Does not demonstrate red flag symptoms such as bowel/bladder incontinence, saddle anesthesia, significant acute weakness.   -PT/OW WBAT  -TLSO at bedside, please wear brace with ambulation and when working with PT  -Anticipate continued conservative mgmt   -Medical management appreciated  -ID Following, lower suspicion for infection, holding abx unless patient spikes fevers  -Multimodal analgesia - recommend low dose opioids, acetaminophen, muscle relaxant as tolerated  -Cw PO steroids  -All patient's questions answered. Patient understands and agrees w/ above plan.  -No acute orthopaedic surgical intervention indicated at this time. This patient is orthopaedically stable for discharge.   -Patient to follow up with Dr. Saunders as an outpatient for further evaluation and management.   -Will discuss case w/ Dr. Saunders and advise with any changes to plan.

## 2021-10-29 NOTE — PROGRESS NOTE ADULT - SUBJECTIVE AND OBJECTIVE BOX
Patient is a 76y old  Female who presents with a chief complaint of     PAST MEDICAL & SURGICAL HISTORY:  History of abdominal aortic aneurysm (AAA)  Stent placed 3/22/19    Anxiety disorder    OA (osteoarthritis)  Right Hip    HLD (hyperlipidemia)    Asthma    S/P AAA repair  Stent Placed 3/22/19    S/P left knee surgery    INTERVAL HISTORY: Heparin infusion ongoing, no c/o chest pain or palpitations.  	  MEDICATIONS:  MEDICATIONS  (STANDING):  apixaban 5 milliGRAM(s) Oral two times a day  budesonide 160 MICROgram(s)/formoterol 4.5 MICROgram(s) Inhaler 2 Puff(s) Inhalation two times a day  lidocaine   4% Patch 1 Patch Transdermal every 24 hours  metoprolol tartrate 25 milliGRAM(s) Oral two times a day  predniSONE   Tablet 40 milliGRAM(s) Oral daily  simvastatin 20 milliGRAM(s) Oral at bedtime    MEDICATIONS  (PRN):      Vitals:  T(F): 97.6 (10-29-21 @ 16:00), Max: 97.6 (10-29-21 @ 12:18)  HR: 68 (10-29-21 @ 16:00) (63 - 70)  BP: 148/79 (10-29-21 @ 16:00) (121/71 - 148/79)  RR: 18 (10-29-21 @ 16:00) (18 - 18)  SpO2: 98% (10-29-21 @ 16:00) (95% - 98%)  Wt(kg): --64.9kg    10-28 @ 07:01  -  10-29 @ 07:00  --------------------------------------------------------  IN:    Heparin Infusion: 150 mL    IV PiggyBack: 250 mL  Total IN: 400 mL    OUT:  Total OUT: 0 mL    Total NET: 400 mL    PHYSICAL EXAM:  Neuro: Awake, responsive  CV: S1 S2 irregular   Lungs: CTABL  GI: Soft, BS +, ND, NT  Extremities: No edema    RADIOLOGY: < from: Xray Chest 1 View- PORTABLE-Urgent (Xray Chest 1 View- PORTABLE-Urgent .) (10.25.21 @ 15:12) >  FINDINGS:    The visualized lungs are clear of airspace consolidations or effusions.   No pneumothorax.    The heart and mediastinum size and configuration are within normal limits.    Visualized osseous structures are intact.    IMPRESSION:   No radiographic evidence of active chest disease.      < end of copied text >      DIAGNOSTIC TESTING:    [x ] Echocardiogram: < from: TTE Echo Complete w/o Contrast w/ Doppler (10.26.21 @ 08:15) >  Summary:   1. Left ventricular ejection fraction, by visual estimation, is 60 to 65%.   2. Normal global left ventricular systolic function.   3. Normal left ventricular internal cavity size.   4. Normal left ventricular size and wall thicknesses, with normal systolic and diastolic function.   5. Normal right ventricular size and function.   6. Normal left atrial size.   7. Normal right atrial size.   8. There is no evidence of pericardial effusion.   9. Mild mitral valve regurgitation.  10. Structurally normal mitral valve, with normalleaflet excursion.  11. Mild tricuspid regurgitation.  12. Sclerotic aortic valve with normal opening.  13. Estimated pulmonary artery systolic pressure is 36.7 mmHg assuming a right atrial pressure of 5 mmHg, which is consistent with borderline pulmonary hypertension.    < end of copied text >    LABS:	 	    29 Oct 2021 08:55    140    |  107    |  27     ----------------------------<  94     4.0     |  27     |  0.55   28 Oct 2021 00:17    143    |  112    |  21     ----------------------------<  167    4.0     |  27     |  0.73   27 Oct 2021 03:41    139    |  112    |  14     ----------------------------<  134    4.4     |  23     |  0.68     Ca    8.7        29 Oct 2021 08:55  Phos  2.6       29 Oct 2021 08:55  Mg     2.6       29 Oct 2021 08:55    TPro  5.3    /  Alb  2.4    /  TBili  0.2    /  DBili  x      /  AST  29     /  ALT  16     /  AlkPhos  66     29 Oct 2021 08:55                          12.6   14.27 )-----------( 210      ( 29 Oct 2021 08:55 )             39.3 ,                       12.5   10.87 )-----------( 194      ( 28 Oct 2021 00:17 )             38.1 ,                       13.7   7.83  )-----------( 178      ( 27 Oct 2021 05:14 )             42.0      TSH: Thyroid Stimulating Hormone, Serum: 0.337 uU/mL (10-26 @ 00:33)    PT/PTT- ( 29 Oct 2021 08:55 )   PT: x    ;  PTT: 70.4 sec    INR: 1.13 ratio (10-28 @ 00:17)  INR: 1.31 ratio (10-27 @ 14:30)

## 2021-10-29 NOTE — PROGRESS NOTE ADULT - SUBJECTIVE AND OBJECTIVE BOX
MAYTE PEREIRA  MRN-[id number]    Follow Up:  r/o osteomyelitis, leukocytosis    Interval History: The pt was seen and examined earlier, no distress, states that she hasn't been up, hence, she does not know if she is still has back pain, feeling better overall. The pt is afebrile, on RA, WBC increased 14.22, Cr and LFTs ok.     PAST MEDICAL & SURGICAL HISTORY:  Asthma    HLD (hyperlipidemia)    OA (osteoarthritis)  Right Hip    Anxiety disorder    History of abdominal aortic aneurysm (AAA)  Stent placed 3/22/19    S/P AAA repair  Stent Placed 3/22/19    S/P left knee surgery        ROS:    [ ] Unobtainable because:  [x ] All other systems negative    Constitutional: no fever, no chills  Head: no trauma  Eyes: no vision changes, no eye pain  ENT:  no sore throat, no rhinorrhea  Cardiovascular:  no chest pain, no palpitation  Respiratory:  no SOB, no cough  GI:  no abd pain, no vomiting, no diarrhea  urinary: no dysuria, no hematuria, no flank pain  musculoskeletal:  no joint pain, no joint swelling  skin:  no rash  neurology:  no headache, no seizure, no change in mental status  psych: no anxiety, no depression         Allergies  aspirin (Short breath)  ceftriaxone (Rash (Moderate); Pruritus)  penicillin (Stomach Upset)  Sulfacetamide Sodium-Sulfur (Stomach Upset)        ANTIMICROBIALS:      OTHER MEDS:  apixaban 5 milliGRAM(s) Oral two times a day  budesonide 160 MICROgram(s)/formoterol 4.5 MICROgram(s) Inhaler 2 Puff(s) Inhalation two times a day  heparin   Injectable 3800 Unit(s) IV Push every 6 hours PRN  heparin  Infusion.  Unit(s)/Hr IV Continuous <Continuous>  lidocaine   4% Patch 1 Patch Transdermal every 24 hours  metoprolol tartrate 25 milliGRAM(s) Oral two times a day  predniSONE   Tablet 40 milliGRAM(s) Oral daily  simvastatin 20 milliGRAM(s) Oral at bedtime      Vital Signs Last 24 Hrs  T(C): 36.4 (29 Oct 2021 12:18), Max: 36.8 (28 Oct 2021 15:35)  T(F): 97.6 (29 Oct 2021 12:18), Max: 98.2 (28 Oct 2021 15:35)  HR: 65 (29 Oct 2021 12:18) (63 - 76)  BP: 121/71 (29 Oct 2021 12:18) (105/60 - 142/79)  BP(mean): 71 (28 Oct 2021 15:00) (71 - 71)  RR: 18 (29 Oct 2021 12:18) (18 - 22)  SpO2: 95% (29 Oct 2021 12:18) (95% - 96%)    Physical Exam:  General:    NAD,  non toxic  Head: atraumatic, normocephalic  Eye: normal sclera and conjunctiva  ENT:    no oral lesions, neck supple  Cardio:     regular S1, S2,  no murmur  Respiratory:    clear b/l,    no wheezing  abd:     soft,   BS +,   no tenderness  :   no CVAT,  no suprapubic tenderness,   no  jimenez  Musculoskeletal:   no joint swelling,   no edema  vascular: no central lines, +PIV   Skin:    resolving rash on torso  Neurologic:     no focal deficit  psych: normal affect    WBC Count: 14.27 K/uL (10-29 @ 08:55)  WBC Count: 10.87 K/uL (10-28 @ 00:17)  WBC Count: 7.83 K/uL (10-27 @ 05:14)  WBC Count: 11.80 K/uL (10-26 @ 00:33)  WBC Count: 11.94 K/uL (10-25 @ 14:38)                            12.6   14.27 )-----------( 210      ( 29 Oct 2021 08:55 )             39.3       10-29    140  |  107  |  27<H>  ----------------------------<  94  4.0   |  27  |  0.55    Ca    8.7      29 Oct 2021 08:55  Phos  2.6     10-29  Mg     2.6     10-29    TPro  5.3<L>  /  Alb  2.4<L>  /  TBili  0.2  /  DBili  x   /  AST  29  /  ALT  16  /  AlkPhos  66  10-29          Creatinine Trend: 0.55<--, 0.73<--, 0.68<--, 0.70<--, 1.00<--      MICROBIOLOGY:  v  .Blood Blood  10-27-21   No growth to date.  --  --      Clean Catch Clean Catch (Midstream)  10-27-21   No growth  --  --      Clean Catch Clean Catch (Midstream)  10-26-21   No growth  --  --      .Blood Blood-Peripheral  10-25-21   No growth to date.  --  --      C-Reactive Protein, Serum: 47 (10-28)  C-Reactive Protein, Serum: 145 (10-26)          Procalcitonin, Serum: 0.26 (10-27-21 @ 02:35)      RADIOLOGY:    < from: MR Thoracic Spine w/wo IV Cont (10.27.21 @ 15:26) >    EXAM:  MR SPINE THORACIC WAW IC                            PROCEDURE DATE:  10/27/2021          INTERPRETATION:  MR OF THE CERVICAL AND THORACIC SPINE WITH AND WITHOUT CONTRAST.    CLINICAL INDICATION: Fever, back pain, abdominal aortic aneurysm status post EVAR.    TECHNIQUE: Multiplanar, multisequence MR imaging of the cervical and thoracic spine was performed with and without contrast.    CONTRAST: 7 mL of intravenous contrast Gadavist was administered without complications..    COMPARISON: Relevant portions of prior MRI lumbar spine, 10/27/2021 and CT chest, abdomen/pelvis, 10/25/2021.    FINDINGS:    MRI cervical spine:    There is maintenance of the usual cervical lordosis without fracture or traumatic malalignment. The vertebral bodyheights are maintained. The intervertebral disc spaces demonstrate loss of height at C5-C6 and C6-C7. The pre and paravertebral soft tissues are within limits of normal without significant edema.    The posterior cranial fossa is unremarkable.    There is diffuse disc bulge at C3-C4 with ossification of the posterior longitudinal ligamentum/disc osteophyte complexes from C4-C5 to C5-C6 contributing to moderate central canal narrowing without cord compression. There is multilevel neural foraminal narrowing bilaterally.    There is no enhancement in the epidural space or in the cord. There is no spondylo- discitis.      MR thoracic spine:  There is maintenance of the usual thoracic kyphosis. The vertebral body heights are maintained. The bone marrow signal demonstrates T2/STIR signal hyperintensity at T9 with peripheral postcontrast enhancement which corresponds to a vertebral hemangioma on CT.    Again noted is the acute compression deformity at L1 vertebral body with edema and enhancement. The enhancing ventral epidural component is again noted and measures 1.8 x 0.7 cm x 2.7 cm in in TRV, AP and craniocaudal dimensions, extending into the left subarticular recess of T11-T12.    In the current examination there is uniform enhancement of the ventral epidural soft tissue density as opposed to the peripheral enhancement seen in the previous examination and this may be related to additional contrast administration to perform the current examination.    There is moderate central canal narrowing without conus compression. No other fractures are seen.        IMPRESSION:    AGAIN NOTED IS THE ACUTE COMPRESSION DEFORMITY AT L1 VERTEBRAL BODY WITHOUT SIGNIFICANT RETROPULSION INTO THE SPINAL CANAL. SOFT TISSUE COMPONENT IN THE VENTRAL EPIDURAL SPACE EXTENDING SUPERIORLY TO THE LEFT SUBARTICULAR ZONE OF T11-T12 IS HIGHLY SUSPICIOUS FOR EXTRUDED DISC WITH SUPERIOR MIGRATION AND SEQUESTRATION.      THERE IS NO EVIDENCE OF SPONDYLODISCITIS IN THE CERVICAL OR THORACIC SPINE.    --- End ofReport ---            BILLY BANERJEE MD; Attending Radiologist  This document has been electronically signed. Oct 27 2021  4:29PM    < end of copied text >     MAYTE PEREIRA  MRN-[id number]    Follow Up:  r/o osteomyelitis, leukocytosis    Interval History: The pt was seen and examined earlier, no distress, states that she hasn't been up, hence, she does not know if she is still has back pain, feeling better overall. The pt is afebrile, on RA, WBC increased 14.22, Cr and LFTs ok.     PAST MEDICAL & SURGICAL HISTORY:  Asthma    HLD (hyperlipidemia)    OA (osteoarthritis)  Right Hip    Anxiety disorder    History of abdominal aortic aneurysm (AAA)  Stent placed 3/22/19    S/P AAA repair  Stent Placed 3/22/19    S/P left knee surgery        ROS:    [ ] Unobtainable because:  [x ] All other systems negative    Constitutional: no fever, no chills  Head: no trauma  Eyes: no vision changes, no eye pain  ENT:  no sore throat, no rhinorrhea  Cardiovascular:  no chest pain, no palpitation  Respiratory:  no SOB, no cough  GI:  no abd pain, no vomiting, no diarrhea  urinary: no dysuria, no hematuria, no flank pain  musculoskeletal:  no joint pain, no joint swelling  skin:  no rash  neurology:  no headache, no seizure, no change in mental status  psych: no anxiety, no depression         Allergies  aspirin (Short breath)  ceftriaxone (Rash (Moderate); Pruritus)  penicillin (Stomach Upset)  Sulfacetamide Sodium-Sulfur (Stomach Upset)        ANTIMICROBIALS:      OTHER MEDS:  apixaban 5 milliGRAM(s) Oral two times a day  budesonide 160 MICROgram(s)/formoterol 4.5 MICROgram(s) Inhaler 2 Puff(s) Inhalation two times a day  heparin   Injectable 3800 Unit(s) IV Push every 6 hours PRN  heparin  Infusion.  Unit(s)/Hr IV Continuous <Continuous>  lidocaine   4% Patch 1 Patch Transdermal every 24 hours  metoprolol tartrate 25 milliGRAM(s) Oral two times a day  predniSONE   Tablet 40 milliGRAM(s) Oral daily  simvastatin 20 milliGRAM(s) Oral at bedtime      Vital Signs Last 24 Hrs  T(C): 36.4 (29 Oct 2021 12:18), Max: 36.8 (28 Oct 2021 15:35)  T(F): 97.6 (29 Oct 2021 12:18), Max: 98.2 (28 Oct 2021 15:35)  HR: 65 (29 Oct 2021 12:18) (63 - 76)  BP: 121/71 (29 Oct 2021 12:18) (105/60 - 142/79)  BP(mean): 71 (28 Oct 2021 15:00) (71 - 71)  RR: 18 (29 Oct 2021 12:18) (18 - 22)  SpO2: 95% (29 Oct 2021 12:18) (95% - 96%)    Physical Exam:  General:    NAD,  non toxic  Head: atraumatic, normocephalic  Eye: normal sclera and conjunctiva  ENT:    no oral lesions, neck supple  Cardio:     regular S1, S2,  no murmur  Respiratory:    clear b/l,    no wheezing  abd:     soft,   BS +,   no tenderness  :   no CVAT,  no suprapubic tenderness,   no  jimenez  Musculoskeletal:   no joint swelling,   no edema  vascular: no central lines, +PIV   Skin:    resolving rash on torso  Neurologic:     no focal deficit  psych: normal affect    WBC Count: 14.27 K/uL (10-29 @ 08:55)  WBC Count: 10.87 K/uL (10-28 @ 00:17)  WBC Count: 7.83 K/uL (10-27 @ 05:14)  WBC Count: 11.80 K/uL (10-26 @ 00:33)  WBC Count: 11.94 K/uL (10-25 @ 14:38)                            12.6   14.27 )-----------( 210      ( 29 Oct 2021 08:55 )             39.3       10-29    140  |  107  |  27<H>  ----------------------------<  94  4.0   |  27  |  0.55    Ca    8.7      29 Oct 2021 08:55  Phos  2.6     10-29  Mg     2.6     10-29    TPro  5.3<L>  /  Alb  2.4<L>  /  TBili  0.2  /  DBili  x   /  AST  29  /  ALT  16  /  AlkPhos  66  10-29          Creatinine Trend: 0.55<--, 0.73<--, 0.68<--, 0.70<--, 1.00<--      MICROBIOLOGY:  v  .Blood Blood  10-27-21   No growth to date.  --  --      Clean Catch Clean Catch (Midstream)  10-27-21   No growth  --  --      Clean Catch Clean Catch (Midstream)  10-26-21   No growth  --  --      .Blood Blood-Peripheral  10-25-21   No growth to date.  --  --      C-Reactive Protein, Serum: 47 (10-28)  C-Reactive Protein, Serum: 145 (10-26)          Procalcitonin, Serum: 0.26 (10-27-21 @ 02:35)      RADIOLOGY:    < from: MR Thoracic Spine w/wo IV Cont (10.27.21 @ 15:26) >    EXAM:  MR SPINE THORACIC WAW IC                            PROCEDURE DATE:  10/27/2021          INTERPRETATION:  MR OF THE CERVICAL AND THORACIC SPINE WITH AND WITHOUT CONTRAST.    CLINICAL INDICATION: Fever, back pain, abdominal aortic aneurysm status post EVAR.    TECHNIQUE: Multiplanar, multisequence MR imaging of the cervical and thoracic spine was performed with and without contrast.    CONTRAST: 7 mL of intravenous contrast Gadavist was administered without complications..    COMPARISON: Relevant portions of prior MRI lumbar spine, 10/27/2021 and CT chest, abdomen/pelvis, 10/25/2021.    FINDINGS:    MRI cervical spine:    There is maintenance of the usual cervical lordosis without fracture or traumatic malalignment. The vertebral bodyheights are maintained. The intervertebral disc spaces demonstrate loss of height at C5-C6 and C6-C7. The pre and paravertebral soft tissues are within limits of normal without significant edema.    The posterior cranial fossa is unremarkable.    There is diffuse disc bulge at C3-C4 with ossification of the posterior longitudinal ligamentum/disc osteophyte complexes from C4-C5 to C5-C6 contributing to moderate central canal narrowing without cord compression. There is multilevel neural foraminal narrowing bilaterally.    There is no enhancement in the epidural space or in the cord. There is no spondylo- discitis.      MR thoracic spine:  There is maintenance of the usual thoracic kyphosis. The vertebral body heights are maintained. The bone marrow signal demonstrates T2/STIR signal hyperintensity at T9 with peripheral postcontrast enhancement which corresponds to a vertebral hemangioma on CT.    Again noted is the acute compression deformity at L1 vertebral body with edema and enhancement. The enhancing ventral epidural component is again noted and measures 1.8 x 0.7 cm x 2.7 cm in in TRV, AP and craniocaudal dimensions, extending into the left subarticular recess of T11-T12.    In the current examination there is uniform enhancement of the ventral epidural soft tissue density as opposed to the peripheral enhancement seen in the previous examination and this may be related to additional contrast administration to perform the current examination.    There is moderate central canal narrowing without conus compression. No other fractures are seen.        IMPRESSION:    AGAIN NOTED IS THE ACUTE COMPRESSION DEFORMITY AT L1 VERTEBRAL BODY WITHOUT SIGNIFICANT RETROPULSION INTO THE SPINAL CANAL. SOFT TISSUE COMPONENT IN THE VENTRAL EPIDURAL SPACE EXTENDING SUPERIORLY TO THE LEFT SUBARTICULAR ZONE OF T11-T12 IS HIGHLY SUSPICIOUS FOR EXTRUDED DISC WITH SUPERIOR MIGRATION AND SEQUESTRATION.      THERE IS NO EVIDENCE OF SPONDYLODISCITIS IN THE CERVICAL OR THORACIC SPINE.

## 2021-10-29 NOTE — PHYSICAL THERAPY INITIAL EVALUATION ADULT - STRENGTHENING, PT EVAL
Cardiac Patient will improve strength by 1 grade in 6-8 weeks to improve overall functional mobility including gait, transfers, bed mobility and decrease risk of falls.

## 2021-10-29 NOTE — PHYSICAL THERAPY INITIAL EVALUATION ADULT - PERTINENT HX OF CURRENT PROBLEM, REHAB EVAL
Pt is a 75 yo F BIBA  due to LBP admitted with afib, acute compression deformity and possible extrusion L1 and possible osteomyelitis.

## 2021-10-29 NOTE — PHYSICAL THERAPY INITIAL EVALUATION ADULT - ADDITIONAL COMMENTS
lives in a pvt house with 4 steps to enter and 7 steps to bathroom. Pt was independent prior to admission.

## 2021-10-30 LAB
ANION GAP SERPL CALC-SCNC: 7 MMOL/L — SIGNIFICANT CHANGE UP (ref 5–17)
APTT BLD: 29.7 SEC — SIGNIFICANT CHANGE UP (ref 27.5–35.5)
BUN SERPL-MCNC: 21 MG/DL — SIGNIFICANT CHANGE UP (ref 7–23)
CALCIUM SERPL-MCNC: 9.1 MG/DL — SIGNIFICANT CHANGE UP (ref 8.5–10.1)
CHLORIDE SERPL-SCNC: 103 MMOL/L — SIGNIFICANT CHANGE UP (ref 96–108)
CO2 SERPL-SCNC: 30 MMOL/L — SIGNIFICANT CHANGE UP (ref 22–31)
CREAT SERPL-MCNC: 0.53 MG/DL — SIGNIFICANT CHANGE UP (ref 0.5–1.3)
CULTURE RESULTS: SIGNIFICANT CHANGE UP
CULTURE RESULTS: SIGNIFICANT CHANGE UP
GLUCOSE SERPL-MCNC: 83 MG/DL — SIGNIFICANT CHANGE UP (ref 70–99)
HCT VFR BLD CALC: 43.8 % — SIGNIFICANT CHANGE UP (ref 34.5–45)
HGB BLD-MCNC: 13.9 G/DL — SIGNIFICANT CHANGE UP (ref 11.5–15.5)
MCHC RBC-ENTMCNC: 27.1 PG — SIGNIFICANT CHANGE UP (ref 27–34)
MCHC RBC-ENTMCNC: 31.7 GM/DL — LOW (ref 32–36)
MCV RBC AUTO: 85.5 FL — SIGNIFICANT CHANGE UP (ref 80–100)
NRBC # BLD: 0 /100 WBCS — SIGNIFICANT CHANGE UP (ref 0–0)
PLATELET # BLD AUTO: 212 K/UL — SIGNIFICANT CHANGE UP (ref 150–400)
POTASSIUM SERPL-MCNC: 3.9 MMOL/L — SIGNIFICANT CHANGE UP (ref 3.5–5.3)
POTASSIUM SERPL-SCNC: 3.9 MMOL/L — SIGNIFICANT CHANGE UP (ref 3.5–5.3)
RBC # BLD: 5.12 M/UL — SIGNIFICANT CHANGE UP (ref 3.8–5.2)
RBC # FLD: 15.2 % — HIGH (ref 10.3–14.5)
SODIUM SERPL-SCNC: 140 MMOL/L — SIGNIFICANT CHANGE UP (ref 135–145)
SPECIMEN SOURCE: SIGNIFICANT CHANGE UP
SPECIMEN SOURCE: SIGNIFICANT CHANGE UP
WBC # BLD: 14.35 K/UL — HIGH (ref 3.8–10.5)
WBC # FLD AUTO: 14.35 K/UL — HIGH (ref 3.8–10.5)

## 2021-10-30 PROCEDURE — 99232 SBSQ HOSP IP/OBS MODERATE 35: CPT

## 2021-10-30 RX ORDER — ALPRAZOLAM 0.25 MG
0.25 TABLET ORAL
Refills: 0 | Status: DISCONTINUED | OUTPATIENT
Start: 2021-10-30 | End: 2021-11-03

## 2021-10-30 RX ADMIN — APIXABAN 5 MILLIGRAM(S): 2.5 TABLET, FILM COATED ORAL at 18:18

## 2021-10-30 RX ADMIN — BUDESONIDE AND FORMOTEROL FUMARATE DIHYDRATE 2 PUFF(S): 160; 4.5 AEROSOL RESPIRATORY (INHALATION) at 05:40

## 2021-10-30 RX ADMIN — LIDOCAINE 1 PATCH: 4 CREAM TOPICAL at 08:19

## 2021-10-30 RX ADMIN — APIXABAN 5 MILLIGRAM(S): 2.5 TABLET, FILM COATED ORAL at 05:40

## 2021-10-30 RX ADMIN — Medication 25 MILLIGRAM(S): at 05:41

## 2021-10-30 RX ADMIN — BUDESONIDE AND FORMOTEROL FUMARATE DIHYDRATE 2 PUFF(S): 160; 4.5 AEROSOL RESPIRATORY (INHALATION) at 18:18

## 2021-10-30 RX ADMIN — Medication 25 MILLIGRAM(S): at 18:18

## 2021-10-30 RX ADMIN — LIDOCAINE 1 PATCH: 4 CREAM TOPICAL at 09:10

## 2021-10-30 RX ADMIN — LIDOCAINE 1 PATCH: 4 CREAM TOPICAL at 21:26

## 2021-10-30 RX ADMIN — Medication 40 MILLIGRAM(S): at 05:41

## 2021-10-30 RX ADMIN — SIMVASTATIN 20 MILLIGRAM(S): 20 TABLET, FILM COATED ORAL at 21:23

## 2021-10-30 NOTE — PROGRESS NOTE ADULT - SUBJECTIVE AND OBJECTIVE BOX
Patient: MAYTE PEREIRA [id number] 76y Female                            Hospitalist Attending Note     at bedside.  No complaints.  No back pain.  No chest pain / palpitations.   No weakness / numbness.     ____________________PHYSICAL EXAM:  GENERAL:  NAD Alert and Oriented x 3   HEENT: NCAT  CARDIOVASCULAR:  RRR S1, S2  LUNGS: CTAB  ABDOMEN:  soft, (-) tenderness, (-) distension, (+) bowel sounds, (-) guarding, (-) rebound (-) rigidity  EXTREMITIES:  no cyanosis / clubbing / edema.   ____________________     VITALS:  Vital Signs Last 24 Hrs  T(C): 36.4 (30 Oct 2021 11:02), Max: 36.4 (30 Oct 2021 00:00)  T(F): 97.5 (30 Oct 2021 11:02), Max: 97.5 (30 Oct 2021 00:00)  HR: 65 (30 Oct 2021 11:02) (62 - 65)  BP: 154/69 (30 Oct 2021 11:02) (143/85 - 154/69)  BP(mean): --  RR: 18 (30 Oct 2021 11:02) (18 - 18)  SpO2: 95% (30 Oct 2021 11:02) (92% - 95%) Daily     Daily Weight in k.4 (30 Oct 2021 05:17)  CAPILLARY BLOOD GLUCOSE        I&O's Summary      HISTORY:  PAST MEDICAL & SURGICAL HISTORY:  Asthma    HLD (hyperlipidemia)    OA (osteoarthritis)  Right Hip    Anxiety disorder    History of abdominal aortic aneurysm (AAA)  Stent placed 3/22/19    S/P AAA repair  Stent Placed 3/22/19    S/P left knee surgery    Allergies    aspirin (Short breath)  ceftriaxone (Rash (Moderate); Pruritus)  penicillin (Stomach Upset)  Sulfacetamide Sodium-Sulfur (Stomach Upset)    Intolerances       LABS:                        13.9   14.35 )-----------( 212      ( 30 Oct 2021 09:22 )             43.8       Culture - Blood (collected 27 Oct 2021 21:27)  Source: .Blood Blood  Preliminary Report (28 Oct 2021 22:01):    No growth to date.    Culture - Blood (collected 27 Oct 2021 21:27)  Source: .Blood Blood  Preliminary Report (28 Oct 2021 22:01):    No growth to date.    10    140  |  103  |  21  ----------------------------<  83  3.9   |  30  |  0.53    Ca    9.1      30 Oct 2021 09:22  Phos  2.6     10-29  Mg     2.6     10-29    TPro  5.3<L>  /  Alb  2.4<L>  /  TBili  0.2  /  DBili  x   /  AST  29  /  ALT  16  /  AlkPhos  66  10-29    PTT - ( 30 Oct 2021 09:22 )  PTT:29.7 sec  LIVER FUNCTIONS - ( 29 Oct 2021 08:55 )  Alb: 2.4 g/dL / Pro: 5.3 gm/dL / ALK PHOS: 66 U/L / ALT: 16 U/L / AST: 29 U/L / GGT: x             CARDIAC MARKERS ( 29 Oct 2021 08:55 )  x     / x     / 39 U/L / x     / x          Culture - Blood (collected 27 Oct 2021 21:27)  Source: .Blood Blood  Preliminary Report (28 Oct 2021 22:01):    No growth to date.    Culture - Blood (collected 27 Oct 2021 21:27)  Source: .Blood Blood  Preliminary Report (28 Oct 2021 22:01):    No growth to date.          MEDICATIONS:  MEDICATIONS  (STANDING):  apixaban 5 milliGRAM(s) Oral two times a day  budesonide 160 MICROgram(s)/formoterol 4.5 MICROgram(s) Inhaler 2 Puff(s) Inhalation two times a day  lidocaine   4% Patch 1 Patch Transdermal every 24 hours  metoprolol tartrate 25 milliGRAM(s) Oral two times a day  simvastatin 20 milliGRAM(s) Oral at bedtime    MEDICATIONS  (PRN):

## 2021-10-30 NOTE — PROGRESS NOTE ADULT - ASSESSMENT
75 yo WF with h/o asthma, AAA repaired x2 years ago,  HLD, OA, and , anxiety  BIBEMS 2 to lower back pain x2-3 weeks.  Initially admitted for possible discitis / abscess.  Seen by ID and orthopedics, taken off antibiotics and observed.  Symptoms have resolved.  Also noted to be in Afib with RVR.  s/p Amiodarone.  Rate controlled.    1. Lower back pain sec [id number] fracture  MRI: AGAIN NOTED IS THE ACUTE COMPRESSION DEFORMITY AT L1 VERTEBRAL BODY WITHOUT SIGNIFICANT RETROPULSION INTO THE SPINAL CANAL. SOFT TISSUE COMPONENT IN THE VENTRAL EPIDURAL SPACE EXTENDING SUPERIORLY TO THE LEFT SUBARTICULAR ZONE OF T11-T12 IS HIGHLY SUSPICIOUS FOR EXTRUDED DISC WITH SUPERIOR MIGRATION AND SEQUESTRATION.  less likely OM/abscess ID on board, not on antibiotics blood cx NGTD If develops high persistent fevers, start vanco and ole as per ID  Ortho on board, no plan for surgery. c/w PO steroids. pain control PT eval recommending RAMILA.    Please wear TLSO brace on ambulation with PT.    2. AFib Rate controlled with metoprolol on heparin drip change to Eliquis. ECHO WNL   3. HLD c/w simvastatin  4. Leucocytosis most likely sec to steroids. monitor wbc.    Stable for RAMILA placement.  Pt and  in agreement.

## 2021-10-31 LAB — CK SERPL-CCNC: 33 U/L — SIGNIFICANT CHANGE UP (ref 26–192)

## 2021-10-31 PROCEDURE — 99232 SBSQ HOSP IP/OBS MODERATE 35: CPT

## 2021-10-31 RX ORDER — POLYETHYLENE GLYCOL 3350 17 G/17G
17 POWDER, FOR SOLUTION ORAL DAILY
Refills: 0 | Status: DISCONTINUED | OUTPATIENT
Start: 2021-10-31 | End: 2021-11-03

## 2021-10-31 RX ADMIN — Medication 25 MILLIGRAM(S): at 18:42

## 2021-10-31 RX ADMIN — BUDESONIDE AND FORMOTEROL FUMARATE DIHYDRATE 2 PUFF(S): 160; 4.5 AEROSOL RESPIRATORY (INHALATION) at 05:52

## 2021-10-31 RX ADMIN — APIXABAN 5 MILLIGRAM(S): 2.5 TABLET, FILM COATED ORAL at 05:52

## 2021-10-31 RX ADMIN — APIXABAN 5 MILLIGRAM(S): 2.5 TABLET, FILM COATED ORAL at 18:42

## 2021-10-31 RX ADMIN — LIDOCAINE 1 PATCH: 4 CREAM TOPICAL at 09:04

## 2021-10-31 RX ADMIN — BUDESONIDE AND FORMOTEROL FUMARATE DIHYDRATE 2 PUFF(S): 160; 4.5 AEROSOL RESPIRATORY (INHALATION) at 18:42

## 2021-10-31 RX ADMIN — Medication 25 MILLIGRAM(S): at 05:51

## 2021-10-31 RX ADMIN — LIDOCAINE 1 PATCH: 4 CREAM TOPICAL at 22:36

## 2021-10-31 RX ADMIN — LIDOCAINE 1 PATCH: 4 CREAM TOPICAL at 07:48

## 2021-10-31 RX ADMIN — SIMVASTATIN 20 MILLIGRAM(S): 20 TABLET, FILM COATED ORAL at 22:33

## 2021-10-31 NOTE — PROGRESS NOTE ADULT - ASSESSMENT
75 yo WF with h/o asthma, AAA repaired x2 years ago,  HLD, OA, and , anxiety  BIBEMS 2 to lower back pain x2-3 weeks.  Initially admitted for possible discitis / abscess.  Seen by ID and orthopedics, taken off antibiotics and observed.  Symptoms have resolved.  Also noted to be in Afib with RVR.  s/p Amiodarone.  Rate controlled.    1. Lower back pain due to L1 Compression fracture - less likely OM/abscess ID on board, not on antibiotics blood cx NGTD.  No Fevers -  If develops high persistent fevers, start vanco and ole as per ID  Ortho on board, no plan for surgery. c/w PO steroids. pain control PT eval recommending RAMILA.   Please wear TLSO brace on ambulation with PT.  2. AFib Rate controlled with metoprolol,  on Eliquis. ECHO WNL   3. HLD c/w simvastatin  4. Leucocytosis most likely sec to steroids. monitor wbc.  5. Constipation - resolved.  Laxatives prn.     Stable for RAMILA placement.  Pt and  in agreement.

## 2021-10-31 NOTE — PROGRESS NOTE ADULT - SUBJECTIVE AND OBJECTIVE BOX
Patient: MAYTE PEREIRA [id number] 76y Female                            Hospitalist Attending Note    No complaints.  No back pain.  No chest pain / palpitations.   No weakness / numbness.     ____________________PHYSICAL EXAM:  GENERAL:  NAD Alert and Oriented x 3   HEENT: NCAT  CARDIOVASCULAR:  RRR S1, S2  LUNGS: CTAB  ABDOMEN:  soft, (-) tenderness, (-) distension, (+) bowel sounds, (-) guarding, (-) rebound (-) rigidity  EXTREMITIES:  no cyanosis / clubbing / edema.   ____________________      VITALS:  Vital Signs Last 24 Hrs  T(C): 36.5 (31 Oct 2021 12:00), Max: 36.6 (30 Oct 2021 17:20)  T(F): 97.7 (31 Oct 2021 12:00), Max: 97.9 (30 Oct 2021 17:20)  HR: 76 (31 Oct 2021 12:00) (60 - 76)  BP: 107/58 (31 Oct 2021 12:00) (107/58 - 162/99)  BP(mean): --  RR: 18 (31 Oct 2021 12:00) (16 - 18)  SpO2: 95% (31 Oct 2021 12:00) (94% - 97%) Daily     Daily   CAPILLARY BLOOD GLUCOSE        I&O's Summary      LABS:                        13.9   14.35 )-----------( 212      ( 30 Oct 2021 09:22 )             43.8     10-30    140  |  103  |  21  ----------------------------<  83  3.9   |  30  |  0.53    Ca    9.1      30 Oct 2021 09:22      PTT - ( 30 Oct 2021 09:22 )  PTT:29.7 sec      CARDIAC MARKERS ( 31 Oct 2021 07:53 )  x     / x     / 33 U/L / x     / x              MEDICATIONS:  ALPRAZolam 0.25 milliGRAM(s) Oral two times a day PRN  apixaban 5 milliGRAM(s) Oral two times a day  budesonide 160 MICROgram(s)/formoterol 4.5 MICROgram(s) Inhaler 2 Puff(s) Inhalation two times a day  lidocaine   4% Patch 1 Patch Transdermal every 24 hours  metoprolol tartrate 25 milliGRAM(s) Oral two times a day  polyethylene glycol 3350 17 Gram(s) Oral daily PRN  simvastatin 20 milliGRAM(s) Oral at bedtime

## 2021-11-01 ENCOUNTER — TRANSCRIPTION ENCOUNTER (OUTPATIENT)
Age: 76
End: 2021-11-01

## 2021-11-01 LAB
CULTURE RESULTS: SIGNIFICANT CHANGE UP
CULTURE RESULTS: SIGNIFICANT CHANGE UP
FLUAV AG NPH QL: SIGNIFICANT CHANGE UP
FLUBV AG NPH QL: SIGNIFICANT CHANGE UP
SARS-COV-2 RNA SPEC QL NAA+PROBE: SIGNIFICANT CHANGE UP
SPECIMEN SOURCE: SIGNIFICANT CHANGE UP
SPECIMEN SOURCE: SIGNIFICANT CHANGE UP

## 2021-11-01 PROCEDURE — 99232 SBSQ HOSP IP/OBS MODERATE 35: CPT

## 2021-11-01 RX ORDER — BUDESONIDE AND FORMOTEROL FUMARATE DIHYDRATE 160; 4.5 UG/1; UG/1
1 AEROSOL RESPIRATORY (INHALATION)
Qty: 0 | Refills: 0 | DISCHARGE
Start: 2021-11-01

## 2021-11-01 RX ORDER — APIXABAN 2.5 MG/1
1 TABLET, FILM COATED ORAL
Qty: 0 | Refills: 0 | DISCHARGE
Start: 2021-11-01

## 2021-11-01 RX ORDER — DIAZEPAM 5 MG
1 TABLET ORAL
Qty: 0 | Refills: 0 | DISCHARGE

## 2021-11-01 RX ORDER — METOPROLOL TARTRATE 50 MG
1 TABLET ORAL
Qty: 0 | Refills: 0 | DISCHARGE
Start: 2021-11-01

## 2021-11-01 RX ADMIN — SIMVASTATIN 20 MILLIGRAM(S): 20 TABLET, FILM COATED ORAL at 21:34

## 2021-11-01 RX ADMIN — LIDOCAINE 1 PATCH: 4 CREAM TOPICAL at 21:34

## 2021-11-01 RX ADMIN — BUDESONIDE AND FORMOTEROL FUMARATE DIHYDRATE 2 PUFF(S): 160; 4.5 AEROSOL RESPIRATORY (INHALATION) at 07:00

## 2021-11-01 RX ADMIN — BUDESONIDE AND FORMOTEROL FUMARATE DIHYDRATE 2 PUFF(S): 160; 4.5 AEROSOL RESPIRATORY (INHALATION) at 17:59

## 2021-11-01 RX ADMIN — LIDOCAINE 1 PATCH: 4 CREAM TOPICAL at 10:45

## 2021-11-01 RX ADMIN — APIXABAN 5 MILLIGRAM(S): 2.5 TABLET, FILM COATED ORAL at 17:59

## 2021-11-01 RX ADMIN — LIDOCAINE 1 PATCH: 4 CREAM TOPICAL at 07:55

## 2021-11-01 RX ADMIN — Medication 25 MILLIGRAM(S): at 07:00

## 2021-11-01 RX ADMIN — APIXABAN 5 MILLIGRAM(S): 2.5 TABLET, FILM COATED ORAL at 07:00

## 2021-11-01 NOTE — DISCHARGE NOTE PROVIDER - NSDCCPCAREPLAN_GEN_ALL_CORE_FT
PRINCIPAL DISCHARGE DIAGNOSIS  Diagnosis: Rapid atrial fibrillation  Assessment and Plan of Treatment:       SECONDARY DISCHARGE DIAGNOSES  Diagnosis: Elevated troponin  Assessment and Plan of Treatment:     Diagnosis: Fracture of lumbar vertebra, compression  Assessment and Plan of Treatment:

## 2021-11-01 NOTE — DISCHARGE NOTE PROVIDER - PROVIDER TOKENS
PROVIDER:[TOKEN:[[id number]]] PROVIDER:[TOKEN:[[id number]]],PROVIDER:[TOKEN:[[id number]]]

## 2021-11-01 NOTE — DISCHARGE NOTE PROVIDER - NSDCFUADDINST_GEN_ALL_CORE_FT
It is important to see your primary physician as well as the physicians noted below within the next week to perform a comprehensive medical review.  Call their offices for an appointment as soon as you leave the hospital.  You will also need to see them for renewal of your medications.  If you do not have a primary physician, contact the [facility] Physician Referral Service at [phone].  To obtain your results, you can access the [employer] Patient Portal at [email or web address].  Your medical issues appear to be stable at this time, but if your symptoms recur or worsen, contact your physicians and/or return to the hospital if necessary.  If you encounter any issues or questions with your medication, call your physicians before stopping the medication.  Do not drive.  Limit your diet to 2 grams of sodium daily.     TOIO BRACE WHILE OOB

## 2021-11-01 NOTE — PROGRESS NOTE ADULT - SUBJECTIVE AND OBJECTIVE BOX
Patient: MAYTE PEREIRA [id number] 76y Female                            Hospitalist Attending Note    No complaints.  No back pain.  No chest pain / palpitations.   No weakness / numbness.     ____________________PHYSICAL EXAM:  GENERAL:  NAD Alert and Oriented x 3   HEENT: NCAT  CARDIOVASCULAR:  RRR S1, S2  LUNGS: CTAB  ABDOMEN:  soft, (-) tenderness, (-) distension, (+) bowel sounds, (-) guarding, (-) rebound (-) rigidity  EXTREMITIES:  no cyanosis / clubbing / edema.   ____________________    VITALS:  Vital Signs Last 24 Hrs  T(C): 36.7 (01 Nov 2021 11:14), Max: 36.9 (31 Oct 2021 23:33)  T(F): 98 (01 Nov 2021 11:14), Max: 98.4 (31 Oct 2021 23:33)  HR: 71 (01 Nov 2021 11:14) (62 - 98)  BP: 111/73 (01 Nov 2021 11:14) (105/71 - 147/63)  BP(mean): --  RR: 18 (01 Nov 2021 11:14) (16 - 18)  SpO2: 96% (01 Nov 2021 11:14) (94% - 96%) Daily     Daily   CAPILLARY BLOOD GLUCOSE        I&O's Summary    31 Oct 2021 07:01  -  01 Nov 2021 07:00  --------------------------------------------------------  IN: 455 mL / OUT: 475 mL / NET: -20 mL        LABS:                CARDIAC MARKERS ( 31 Oct 2021 07:53 )  x     / x     / 33 U/L / x     / x              MEDICATIONS:  ALPRAZolam 0.25 milliGRAM(s) Oral two times a day PRN  apixaban 5 milliGRAM(s) Oral two times a day  budesonide 160 MICROgram(s)/formoterol 4.5 MICROgram(s) Inhaler 2 Puff(s) Inhalation two times a day  lidocaine   4% Patch 1 Patch Transdermal every 24 hours  metoprolol tartrate 25 milliGRAM(s) Oral two times a day  polyethylene glycol 3350 17 Gram(s) Oral daily PRN  simvastatin 20 milliGRAM(s) Oral at bedtime

## 2021-11-01 NOTE — DISCHARGE NOTE PROVIDER - CARE PROVIDER_API CALL
Nneka Saunders (DO)  Orthopaedic Surgery Surgery  [address] [facility], [address] [place] [address]  Phone: [phone]  Fax: [phone]  Follow Up Time:    Nneka Saunders (DO)  Orthopaedic Surgery Surgery  [address] [facility], [address]  [place] [address]  Phone: [phone]  Fax: [phone]  Follow Up Time:     Dharmesh Berry)  Cardiology; Interventional Cardiology  [address], [place] [id number]  Phone: [phone]  Fax: [phone]  Follow Up Time:

## 2021-11-01 NOTE — DISCHARGE NOTE PROVIDER - HOSPITAL COURSE
77 yo WF with h/o asthma, AAA repaired x2 years ago,  HLD, OA, and , anxiety  BIBEMS 2 to lower back pain x2-3 weeks.  Initially admitted for possible discitis / abscess.  Seen by ID and orthopedics, taken off antibiotics and observed.  Symptoms have resolved.  Also noted to be in Afib with RVR.  s/p Amiodarone.  Rate controlled.    1. Lower back pain due to L1 Compression fracture - less likely OM/abscess ID on board, not on antibiotics blood cx NGTD.  No Fevers -  If develops high persistent fevers, start vanco and ole as per ID  Ortho on board, no plan for surgery. c/w PO steroids. pain control PT eval recommending RAMILA.   Please wear TLSO brace on ambulation with PT.  2. AFib Rate controlled with metoprolol,  on Eliquis. ECHO WNL   3. HLD c/w simvastatin  4. Leucocytosis most likely sec to steroids. monitor wbc.  5. Constipation - resolved.  Laxatives prn.     Stable for RAMILA placement.  Pt and  in agreement.   Disposition: Stable for discharge.  Outpatient followup discussed.  Total time spent on discharge is  35  minutes. 75 yo WF with h/o asthma, AAA repaired x2 years ago,  HLD, OA, and , anxiety  BIBEMS 2 to lower back pain x2-3 weeks.  Initially admitted for possible discitis / abscess.  Seen by ID and orthopedics, taken off antibiotics and observed.  Symptoms have resolved.  Also noted to be in Afib with RVR.  s/p Amiodarone.  Rate controlled.    1. Lower back pain due to L1 Compression fracture - less likely OM/abscess ID on board, not on antibiotics blood cx NGTD.  No Fevers -  If develops high persistent fevers, start vanco and ole as per ID  Ortho on board, no plan for surgery. pain control PT eval recommending RAMILA.   Please wear TLSO brace on ambulation with PT.  2. AFib Rate controlled with metoprolol,  on Eliquis. ECHO WNL   3. HLD c/w simvastatin  4. Leukocytosis most likely sec to Solumedrol, Prednisone earlier in hospitalization.  No S/S of active infection, now off steroids.  monitor wbc.  5. Constipation - resolved.  Laxatives prn.     Stable for RAMILA placement.      Disposition: Stable for discharge.  Outpatient followup discussed.  Total time spent on discharge is  40 minutes.

## 2021-11-01 NOTE — DISCHARGE NOTE PROVIDER - DETAILS OF MALNUTRITION DIAGNOSIS/DIAGNOSES
This patient has been assessed with a concern for Malnutrition and was treated during this hospitalization for the following Nutrition diagnosis/diagnoses:     -  10/27/2021: Moderate protein-calorie malnutrition

## 2021-11-01 NOTE — PROGRESS NOTE ADULT - ASSESSMENT
77 yo WF with h/o asthma, AAA repaired x2 years ago,  HLD, OA, and , anxiety  BIBEMS 2 to lower back pain x2-3 weeks.  Initially admitted for possible discitis / abscess.  Seen by ID and orthopedics, taken off antibiotics and observed.  Symptoms have resolved.  Also noted to be in Afib with RVR.  s/p Amiodarone.  Rate controlled.    1. Lower back pain due to L1 Compression fracture - less likely OM/abscess ID on board, not on antibiotics blood cx NGTD.  No Fevers -  If develops high persistent fevers, start vanco and ole as per ID  Ortho on board, no plan for surgery. pain control PT eval recommending RAMILA.   Please wear TLSO brace on ambulation with PT.  2. AFib Rate controlled with metoprolol,  on Eliquis. ECHO WNL   3. HLD c/w simvastatin  4. Leucocytosis most likely sec to steroids. monitor wbc.  5. Constipation - resolved.  Laxatives prn.     Stable for RAMILA placement.  Pt and  in agreement.

## 2021-11-01 NOTE — DISCHARGE NOTE PROVIDER - NSDCMRMEDTOKEN_GEN_ALL_CORE_FT
apixaban 5 mg oral tablet: 1 tab(s) orally 2 times a day  budesonide-formoterol 160 mcg-4.5 mcg/inh inhalation aerosol: 1 puff(s) inhaled 2 times a day  metoprolol tartrate 25 mg oral tablet: 1 tab(s) orally 2 times a day  pantoprazole 40 mg oral delayed release tablet: 1 tab(s) orally once a day (before a meal) MDD:1  polyethylene glycol 3350 oral powder for reconstitution: 17 gram(s) orally once a day  ProAir HFA 90 mcg/inh inhalation aerosol: 2 puff(s) inhaled 4 times a day, As Needed  simvastatin 20 mg oral tablet: 1 tab(s) orally once a day (at bedtime)  TLSO: TLSO  To be worn with ambulation  Dx: Inability to ambulate  ICD 10: R26.2

## 2021-11-01 NOTE — DISCHARGE NOTE PROVIDER - CARE PROVIDERS DIRECT ADDRESSES
,DirectAddress_Unknown ,DirectAddress_Unknown,[email or web address]

## 2021-11-02 LAB
ANION GAP SERPL CALC-SCNC: 7 MMOL/L — SIGNIFICANT CHANGE UP (ref 5–17)
BUN SERPL-MCNC: 21 MG/DL — SIGNIFICANT CHANGE UP (ref 7–23)
CALCIUM SERPL-MCNC: 9.1 MG/DL — SIGNIFICANT CHANGE UP (ref 8.5–10.1)
CHLORIDE SERPL-SCNC: 100 MMOL/L — SIGNIFICANT CHANGE UP (ref 96–108)
CO2 SERPL-SCNC: 29 MMOL/L — SIGNIFICANT CHANGE UP (ref 22–31)
CREAT SERPL-MCNC: 0.65 MG/DL — SIGNIFICANT CHANGE UP (ref 0.5–1.3)
GLUCOSE SERPL-MCNC: 92 MG/DL — SIGNIFICANT CHANGE UP (ref 70–99)
HCT VFR BLD CALC: 48.4 % — HIGH (ref 34.5–45)
HGB BLD-MCNC: 15.5 G/DL — SIGNIFICANT CHANGE UP (ref 11.5–15.5)
MCHC RBC-ENTMCNC: 26.8 PG — LOW (ref 27–34)
MCHC RBC-ENTMCNC: 32 GM/DL — SIGNIFICANT CHANGE UP (ref 32–36)
MCV RBC AUTO: 83.6 FL — SIGNIFICANT CHANGE UP (ref 80–100)
NRBC # BLD: 0 /100 WBCS — SIGNIFICANT CHANGE UP (ref 0–0)
PLATELET # BLD AUTO: 298 K/UL — SIGNIFICANT CHANGE UP (ref 150–400)
POTASSIUM SERPL-MCNC: 4 MMOL/L — SIGNIFICANT CHANGE UP (ref 3.5–5.3)
POTASSIUM SERPL-SCNC: 4 MMOL/L — SIGNIFICANT CHANGE UP (ref 3.5–5.3)
RBC # BLD: 5.79 M/UL — HIGH (ref 3.8–5.2)
RBC # FLD: 14.9 % — HIGH (ref 10.3–14.5)
SODIUM SERPL-SCNC: 136 MMOL/L — SIGNIFICANT CHANGE UP (ref 135–145)
WBC # BLD: 16.89 K/UL — HIGH (ref 3.8–10.5)
WBC # FLD AUTO: 16.89 K/UL — HIGH (ref 3.8–10.5)

## 2021-11-02 PROCEDURE — 99232 SBSQ HOSP IP/OBS MODERATE 35: CPT

## 2021-11-02 RX ADMIN — BUDESONIDE AND FORMOTEROL FUMARATE DIHYDRATE 2 PUFF(S): 160; 4.5 AEROSOL RESPIRATORY (INHALATION) at 06:33

## 2021-11-02 RX ADMIN — LIDOCAINE 1 PATCH: 4 CREAM TOPICAL at 07:30

## 2021-11-02 RX ADMIN — LIDOCAINE 1 PATCH: 4 CREAM TOPICAL at 21:11

## 2021-11-02 RX ADMIN — APIXABAN 5 MILLIGRAM(S): 2.5 TABLET, FILM COATED ORAL at 06:33

## 2021-11-02 RX ADMIN — APIXABAN 5 MILLIGRAM(S): 2.5 TABLET, FILM COATED ORAL at 17:50

## 2021-11-02 RX ADMIN — LIDOCAINE 1 PATCH: 4 CREAM TOPICAL at 11:17

## 2021-11-02 RX ADMIN — Medication 25 MILLIGRAM(S): at 06:33

## 2021-11-02 RX ADMIN — POLYETHYLENE GLYCOL 3350 17 GRAM(S): 17 POWDER, FOR SOLUTION ORAL at 11:21

## 2021-11-02 RX ADMIN — SIMVASTATIN 20 MILLIGRAM(S): 20 TABLET, FILM COATED ORAL at 21:12

## 2021-11-02 RX ADMIN — BUDESONIDE AND FORMOTEROL FUMARATE DIHYDRATE 2 PUFF(S): 160; 4.5 AEROSOL RESPIRATORY (INHALATION) at 17:50

## 2021-11-02 NOTE — PROGRESS NOTE ADULT - ASSESSMENT
77 yo WF with h/o asthma, AAA repaired x2 years ago,  HLD, OA, and , anxiety  BIBEMS 2 to lower back pain x2-3 weeks.  Initially admitted for possible discitis / abscess.  Seen by ID and orthopedics, taken off antibiotics and observed.  Symptoms have resolved.  Also noted to be in Afib with RVR.  s/p Amiodarone.  Rate controlled.    1. Lower back pain due to L1 Compression fracture - less likely OM/abscess ID on board, not on antibiotics blood cx NGTD.  No Fevers -  If develops high persistent fevers, start vanco and ole as per ID  Ortho on board, no plan for surgery. pain control PT eval recommending RAMILA.   Please wear TLSO brace on ambulation with PT.  2. AFib Rate controlled with metoprolol,  on Eliquis. ECHO WNL   3. HLD c/w simvastatin  4. Leukocytosis most likely sec to Solumedrol, Prednisone earlier in hospitalization.  No S/S of active infection, now off steroids.  monitor wbc.  5. Constipation - resolved.  Laxatives prn.     Stable for RAMILA placement.

## 2021-11-02 NOTE — PROGRESS NOTE ADULT - SUBJECTIVE AND OBJECTIVE BOX
Patient: MAYTE PEREIRA [id number] 76y Female                            Hospitalist Attending Note    No complaints.  No back pain.  No chest pain / palpitations.   No weakness / numbness.     ____________________PHYSICAL EXAM:  GENERAL:  NAD Alert and Oriented x 3   HEENT: NCAT  CARDIOVASCULAR:  RRR S1, S2  LUNGS: CTAB  ABDOMEN:  soft, (-) tenderness, (-) distension, (+) bowel sounds, (-) guarding, (-) rebound (-) rigidity  EXTREMITIES:  no cyanosis / clubbing / edema.   ____________________    VITALS:  Vital Signs Last 24 Hrs  T(C): 36.7 (2021 11:47), Max: 36.7 (2021 11:47)  T(F): 98.1 (2021 11:47), Max: 98.1 (2021 11:47)  HR: 80 (2021 14:16) (70 - 103)  BP: 105/69 (2021 14:16) (105/69 - 137/66)  BP(mean): --  RR: 19 (2021 14:16) (18 - 20)  SpO2: 95% (2021 14:16) (94% - 100%) Daily     Daily Weight in k.3 (2021 05:15)  CAPILLARY BLOOD GLUCOSE        I&O's Summary      LABS:                        15.5   16.89 )-----------( 298      ( 2021 06:27 )             48.4     11-02    136  |  100  |  21  ----------------------------<  92  4.0   |  29  |  0.65    Ca    9.1      2021 06:27                    MEDICATIONS:  ALPRAZolam 0.25 milliGRAM(s) Oral two times a day PRN  apixaban 5 milliGRAM(s) Oral two times a day  budesonide 160 MICROgram(s)/formoterol 4.5 MICROgram(s) Inhaler 2 Puff(s) Inhalation two times a day  lidocaine   4% Patch 1 Patch Transdermal every 24 hours  metoprolol tartrate 25 milliGRAM(s) Oral two times a day  polyethylene glycol 3350 17 Gram(s) Oral daily PRN  simvastatin 20 milliGRAM(s) Oral at bedtime

## 2021-11-03 ENCOUNTER — TRANSCRIPTION ENCOUNTER (OUTPATIENT)
Age: 76
End: 2021-11-03

## 2021-11-03 VITALS
DIASTOLIC BLOOD PRESSURE: 65 MMHG | HEART RATE: 82 BPM | RESPIRATION RATE: 18 BRPM | TEMPERATURE: 98 F | SYSTOLIC BLOOD PRESSURE: 95 MMHG | OXYGEN SATURATION: 94 %

## 2021-11-03 PROCEDURE — 99239 HOSP IP/OBS DSCHRG MGMT >30: CPT

## 2021-11-03 RX ADMIN — LIDOCAINE 1 PATCH: 4 CREAM TOPICAL at 08:02

## 2021-11-03 RX ADMIN — POLYETHYLENE GLYCOL 3350 17 GRAM(S): 17 POWDER, FOR SOLUTION ORAL at 10:04

## 2021-11-03 RX ADMIN — Medication 0.25 MILLIGRAM(S): at 10:00

## 2021-11-03 RX ADMIN — APIXABAN 5 MILLIGRAM(S): 2.5 TABLET, FILM COATED ORAL at 06:00

## 2021-11-03 RX ADMIN — LIDOCAINE 1 PATCH: 4 CREAM TOPICAL at 10:02

## 2021-11-03 RX ADMIN — BUDESONIDE AND FORMOTEROL FUMARATE DIHYDRATE 2 PUFF(S): 160; 4.5 AEROSOL RESPIRATORY (INHALATION) at 06:00

## 2021-11-03 NOTE — PROGRESS NOTE ADULT - SUBJECTIVE AND OBJECTIVE BOX
Patient: MAYTE PEREIRA [id number] 76y Female                            Hospitalist Attending Note    No complaints.  No pain/ weakness / numbness.   No chest pain / palpitations.     ____________________PHYSICAL EXAM:  GENERAL:  NAD Alert and Oriented x 3   HEENT: NCAT  CARDIOVASCULAR:  RRR S1, S2  LUNGS: CTAB  ABDOMEN:  soft, (-) tenderness, (-) distension, (+) bowel sounds, (-) guarding, (-) rebound (-) rigidity  EXTREMITIES:  no cyanosis / clubbing / edema.   ____________________    VITALS:  Vital Signs Last 24 Hrs  T(C): 36.9 (2021 12:26), Max: 37.3 (2021 00:29)  T(F): 98.5 (2021 12:), Max: 99.2 (2021 00:29)  HR: 82 (:) (73 - 82)  BP: 95/65 (2021 12:) (95/65 - 108/71)  BP(mean): --  RR: 18 (2021 12:) (18 - 19)  SpO2: 94% (2021 12:26) (94% - 97%) Daily     Daily Weight in k.2 (2021 06:01)  CAPILLARY BLOOD GLUCOSE        I&O's Summary    2021 07:01  -  2021 07:00  --------------------------------------------------------  IN: 0 mL / OUT: 500 mL / NET: -500 mL        LABS:                        15.5   16.89 )-----------( 298      ( 2021 06:27 )             48.4     11    136  |  100  |  21  ----------------------------<  92  4.0   |  29  |  0.65    Ca    9.1      2021 06:27                    MEDICATIONS:  ALPRAZolam 0.25 milliGRAM(s) Oral two times a day PRN  apixaban 5 milliGRAM(s) Oral two times a day  budesonide 160 MICROgram(s)/formoterol 4.5 MICROgram(s) Inhaler 2 Puff(s) Inhalation two times a day  lidocaine   4% Patch 1 Patch Transdermal every 24 hours  metoprolol tartrate 25 milliGRAM(s) Oral two times a day  polyethylene glycol 3350 17 Gram(s) Oral daily PRN  simvastatin 20 milliGRAM(s) Oral at bedtime

## 2021-11-03 NOTE — PROGRESS NOTE ADULT - NUTRITIONAL ASSESSMENT
This patient has been assessed with a concern for Malnutrition and has been determined to have a diagnosis/diagnoses of Moderate protein-calorie malnutrition.    This patient is being managed with:   Diet Regular-  Entered: Oct 28 2021  8:26AM    
This patient has been assessed with a concern for Malnutrition and has been determined to have a diagnosis/diagnoses of Moderate protein-calorie malnutrition.    This patient is being managed with:   Diet NPO after Midnight-     NPO Start Date: 27-Oct-2021   NPO Start Time: 23:59  Except Medications  Entered: Oct 27 2021  1:50PM    
This patient has been assessed with a concern for Malnutrition and has been determined to have a diagnosis/diagnoses of Moderate protein-calorie malnutrition.    This patient is being managed with:   Diet Regular-  Entered: Oct 27 2021  6:51PM    Diet NPO after Midnight-     NPO Start Date: 27-Oct-2021   NPO Start Time: 23:59  Except Medications  Entered: Oct 27 2021  1:50PM    
This patient has been assessed with a concern for Malnutrition and has been determined to have a diagnosis/diagnoses of Moderate protein-calorie malnutrition.    This patient is being managed with:   Diet Regular-  Entered: Oct 28 2021  8:26AM    

## 2021-11-03 NOTE — CHART NOTE - NSCHARTNOTEFT_GEN_A_CORE
Per chart pt with PMH AAA, HLD, OA, asthma, anxiety, presents with lower back pain x 3 weeks, found with septic shock and L1 compression fracture. Pending discharge to [facility] today ().     Factors impacting intake: [ ] none [ ] nausea  [ ] vomiting [ ] diarrhea [x] constipation  [ ]chewing problems [ ] swallowing issues  [x] other: pain; discomfort    Diet Prescription: Diet, Regular (10-28-21 @ 08:26)    Intake: 50-90% of documented meals per flow sheets. Pt reports she is consuming ~50% of meals due to being uncomfortable in bed throughout hospital stay. pt complains of constipation; states she has been drinking prune juice and is noted on bowel regimen. Encouraged pt to consume prune juice and fluids on bedside table. Refuses Ensure Enlive but amenable to receiving Ensure Clear x 2/day (provides 480 kcal, 16 g protein). Preferences taken and relayed to diet office. Pt made aware RD remains available.     Current Weight: () 59.2kg (10/25) 64.9kg   % Weight Change: ?accuracy of bed scale weights    No noted edema as per flow sheets.     Physical Appearance: Nutrition focused physical exam conducted:  Subcutaneous fat loss: [moderate] Orbital fat pads region, [none ]Buccal fat region, [mild ]Triceps region,  [unable ]Ribs region.  Muscle wasting: [none ]Temples region, [ moderate]Clavicle region, [mild]Shoulder region, [ unable]Scapula region, [unable ]Interosseous region,  [severe ]thigh region, [severe ]Calf region    Pertinent Medications: MEDICATIONS  (STANDING):  apixaban 5 milliGRAM(s) Oral two times a day  budesonide 160 MICROgram(s)/formoterol 4.5 MICROgram(s) Inhaler 2 Puff(s) Inhalation two times a day  lidocaine   4% Patch 1 Patch Transdermal every 24 hours  metoprolol tartrate 25 milliGRAM(s) Oral two times a day  simvastatin 20 milliGRAM(s) Oral at bedtime    MEDICATIONS  (PRN):  ALPRAZolam 0.25 milliGRAM(s) Oral two times a day PRN Anxiety  polyethylene glycol 3350 17 Gram(s) Oral daily PRN Constipation    Pertinent Labs:  Na136 mmol/L Glu 92 mg/dL K+ 4.0 mmol/L Cr  0.65 mg/dL BUN 21 mg/dL 10- Phos 2.6 mg/dL 10- Alb 2.4 g/dL<L>      Skin: no pressure injuries per flow sheets    Estimated Needs:   [x] no change since previous assessment for energy: 10/27  [x] recalculated for protein using IBW 47.6k-1.2g protein/kg= 48-56g protein/day    Previous Nutrition Diagnosis:   [x] Malnutrition moderate malnutrition in context of chronic illness.     Etiology inadequate protein-energy intake in setting of hx of anxiety disorder, OA, debility.     Signs/Symptoms physical findings of mild-moderate fat loss and mild-severe muscle wasting as noted above.     Goal/Expected Outcome pt to consume >75% of meals & supplement. (not consistently met)    Nutrition Diagnosis is [x] ongoing  [ ] resolved [ ] not applicable     New Nutrition Diagnosis: [x] not applicable      Interventions:   Recommend  [x] Continue current diet as ordered   [ ] Change Diet To:  [x] Nutrition Supplement: Add Ensure Clear x 2/day (provides 480 kcal, 16 g protein)   [ ] Nutrition Support  [x] Other: Continue to provide assistance and encouragement with PO intake     Monitoring and Evaluation:   [x] PO intake [ x ] Tolerance to diet prescription [ x ] weights [ x ] labs[ x ] follow up per protocol  [ ] other:

## 2021-11-03 NOTE — PROGRESS NOTE ADULT - PROVIDER SPECIALTY LIST ADULT
Critical Care
Hospitalist
Orthopedics
Critical Care
Hospitalist
Infectious Disease
Orthopedics
Cardiology
Hospitalist
Hospitalist
Infectious Disease
Hospitalist

## 2021-11-03 NOTE — DISCHARGE NOTE NURSING/CASE MANAGEMENT/SOCIAL WORK - PATIENT PORTAL LINK FT
You can access the FollowMyHealth Patient Portal offered by [facility] by registering at the following website: [email or web address]. By joining [facility]’s FollowMyHealth portal, you will also be able to view your health information using other applications (apps) compatible with our system.

## 2021-11-05 ENCOUNTER — NON-APPOINTMENT (OUTPATIENT)
Age: 76
End: 2021-11-05

## 2021-11-08 DIAGNOSIS — R26.2 DIFFICULTY IN WALKING, NOT ELSEWHERE CLASSIFIED: ICD-10-CM

## 2021-11-08 DIAGNOSIS — M19.90 UNSPECIFIED OSTEOARTHRITIS, UNSPECIFIED SITE: ICD-10-CM

## 2021-11-08 DIAGNOSIS — Z88.8 ALLERGY STATUS TO OTHER DRUGS, MEDICAMENTS AND BIOLOGICAL SUBSTANCES STATUS: ICD-10-CM

## 2021-11-08 DIAGNOSIS — J45.909 UNSPECIFIED ASTHMA, UNCOMPLICATED: ICD-10-CM

## 2021-11-08 DIAGNOSIS — F41.9 ANXIETY DISORDER, UNSPECIFIED: ICD-10-CM

## 2021-11-08 DIAGNOSIS — E78.5 HYPERLIPIDEMIA, UNSPECIFIED: ICD-10-CM

## 2021-11-08 DIAGNOSIS — I21.4 NON-ST ELEVATION (NSTEMI) MYOCARDIAL INFARCTION: ICD-10-CM

## 2021-11-08 DIAGNOSIS — K59.00 CONSTIPATION, UNSPECIFIED: ICD-10-CM

## 2021-11-08 DIAGNOSIS — M54.50 LOW BACK PAIN, UNSPECIFIED: ICD-10-CM

## 2021-11-08 DIAGNOSIS — Z79.01 LONG TERM (CURRENT) USE OF ANTICOAGULANTS: ICD-10-CM

## 2021-11-08 DIAGNOSIS — M48.56XA COLLAPSED VERTEBRA, NOT ELSEWHERE CLASSIFIED, LUMBAR REGION, INITIAL ENCOUNTER FOR FRACTURE: ICD-10-CM

## 2021-11-08 DIAGNOSIS — I48.91 UNSPECIFIED ATRIAL FIBRILLATION: ICD-10-CM
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
## 2021-11-08 DIAGNOSIS — Z88.2 ALLERGY STATUS TO SULFONAMIDES: ICD-10-CM

## 2021-11-08 DIAGNOSIS — Z88.0 ALLERGY STATUS TO PENICILLIN: ICD-10-CM

## 2021-11-08 DIAGNOSIS — D72.829 ELEVATED WHITE BLOOD CELL COUNT, UNSPECIFIED: ICD-10-CM

## 2021-11-08 DIAGNOSIS — T36.1X5A ADVERSE EFFECT OF CEPHALOSPORINS AND OTHER BETA-LACTAM ANTIBIOTICS, INITIAL ENCOUNTER: ICD-10-CM

## 2021-11-22 ENCOUNTER — APPOINTMENT (OUTPATIENT)
Dept: CARDIOLOGY | Facility: CLINIC | Age: 76
End: 2021-11-22

## 2021-12-06 ENCOUNTER — RX RENEWAL (OUTPATIENT)
Age: 76
End: 2021-12-06

## 2021-12-14 ENCOUNTER — APPOINTMENT (OUTPATIENT)
Dept: INTERNAL MEDICINE | Facility: CLINIC | Age: 76
End: 2021-12-14
Payer: MEDICARE

## 2021-12-14 VITALS
RESPIRATION RATE: 17 BRPM | HEIGHT: 61 IN | OXYGEN SATURATION: 98 % | HEART RATE: 68 BPM | WEIGHT: 125 LBS | BODY MASS INDEX: 23.6 KG/M2 | DIASTOLIC BLOOD PRESSURE: 74 MMHG | SYSTOLIC BLOOD PRESSURE: 120 MMHG | TEMPERATURE: 98.7 F

## 2021-12-14 DIAGNOSIS — M86.9 OSTEOMYELITIS, UNSPECIFIED: ICD-10-CM

## 2021-12-14 DIAGNOSIS — K59.00 CONSTIPATION, UNSPECIFIED: ICD-10-CM

## 2021-12-14 PROCEDURE — 36415 COLL VENOUS BLD VENIPUNCTURE: CPT

## 2021-12-14 PROCEDURE — 99215 OFFICE O/P EST HI 40 MIN: CPT | Mod: 25

## 2021-12-16 LAB
ALBUMIN SERPL ELPH-MCNC: 4 G/DL
ALP BLD-CCNC: 63 U/L
ALT SERPL-CCNC: 6 U/L
ANION GAP SERPL CALC-SCNC: 13 MMOL/L
AST SERPL-CCNC: 12 U/L
BASOPHILS # BLD AUTO: 0.06 K/UL
BASOPHILS NFR BLD AUTO: 0.5 %
BILIRUB SERPL-MCNC: 0.2 MG/DL
BUN SERPL-MCNC: 26 MG/DL
CALCIUM SERPL-MCNC: 9 MG/DL
CHLORIDE SERPL-SCNC: 103 MMOL/L
CO2 SERPL-SCNC: 24 MMOL/L
CREAT SERPL-MCNC: 0.9 MG/DL
EOSINOPHIL # BLD AUTO: 0.03 K/UL
EOSINOPHIL NFR BLD AUTO: 0.2 %
GLUCOSE SERPL-MCNC: 114 MG/DL
HCT VFR BLD CALC: 40.4 %
HGB BLD-MCNC: 12.7 G/DL
IMM GRANULOCYTES NFR BLD AUTO: 0.4 %
LYMPHOCYTES # BLD AUTO: 1.77 K/UL
LYMPHOCYTES NFR BLD AUTO: 14.6 %
MAN DIFF?: NORMAL
MCHC RBC-ENTMCNC: 27.5 PG
MCHC RBC-ENTMCNC: 31.4 GM/DL
MCV RBC AUTO: 87.6 FL
MONOCYTES # BLD AUTO: 0.97 K/UL
MONOCYTES NFR BLD AUTO: 8 %
NEUTROPHILS # BLD AUTO: 9.27 K/UL
NEUTROPHILS NFR BLD AUTO: 76.3 %
PLATELET # BLD AUTO: 227 K/UL
POTASSIUM SERPL-SCNC: 4.3 MMOL/L
PROT SERPL-MCNC: 5.9 G/DL
RBC # BLD: 4.61 M/UL
RBC # FLD: 16.7 %
SODIUM SERPL-SCNC: 140 MMOL/L
WBC # FLD AUTO: 12.15 K/UL

## 2021-12-17 PROBLEM — K59.00 CONSTIPATION: Status: ACTIVE | Noted: 2021-09-21

## 2021-12-17 PROBLEM — M86.9 OSTEOMYELITIS: Status: ACTIVE | Noted: 2021-12-14

## 2021-12-17 NOTE — HISTORY OF PRESENT ILLNESS
[FreeTextEntry2] : 76 year old female presents for Transitional Care visit\par Admitted with back pain / disc issues /  possible L1 osteomyelitis given IV ABX\par Lumbar disc herniation.  going for Ortho followup\par A- Fib put on Eliquis.    to followup with Cardio later this month.  \par echo showed borderline Pulm HTN\par CT angio was normal\par was in rehab 18 days. \par went home a few days before thanksgiving.

## 2021-12-17 NOTE — REVIEW OF SYSTEMS
[Constipation] : constipation [Back Pain] : back pain [Fever] : no fever [Chills] : no chills [Recent Change In Weight] : ~T no recent weight change [Vision Problems] : no vision problems [Earache] : no earache [Nasal Discharge] : no nasal discharge [Sore Throat] : no sore throat [Chest Pain] : no chest pain [Palpitations] : no palpitations [Shortness Of Breath] : no shortness of breath [Wheezing] : no wheezing [Abdominal Pain] : no abdominal pain [Nausea] : no nausea [Diarrhea] : diarrhea [Vomiting] : no vomiting [Dysuria] : no dysuria [Hematuria] : no hematuria [Frequency] : no frequency [Joint Pain] : no joint pain [Joint Swelling] : no joint swelling [Skin Rash] : no skin rash [Headache] : no headache [Dizziness] : no dizziness [Anxiety] : no anxiety [Depression] : no depression [Swollen Glands] : no swollen glands

## 2022-01-03 ENCOUNTER — APPOINTMENT (OUTPATIENT)
Dept: INTERNAL MEDICINE | Facility: CLINIC | Age: 77
End: 2022-01-03
Payer: MEDICARE

## 2022-01-03 VITALS
TEMPERATURE: 98.2 F | HEIGHT: 61 IN | OXYGEN SATURATION: 98 % | DIASTOLIC BLOOD PRESSURE: 82 MMHG | BODY MASS INDEX: 23.98 KG/M2 | RESPIRATION RATE: 18 BRPM | SYSTOLIC BLOOD PRESSURE: 124 MMHG | HEART RATE: 84 BPM | WEIGHT: 127 LBS

## 2022-01-03 DIAGNOSIS — M51.26 OTHER INTERVERTEBRAL DISC DISPLACEMENT, LUMBAR REGION: ICD-10-CM

## 2022-01-03 PROCEDURE — 81003 URINALYSIS AUTO W/O SCOPE: CPT | Mod: QW

## 2022-01-03 PROCEDURE — 99214 OFFICE O/P EST MOD 30 MIN: CPT | Mod: 25

## 2022-01-03 NOTE — HISTORY OF PRESENT ILLNESS
[FreeTextEntry1] : UTI [de-identified] : 75 y/o female presents with concerns for a several day history of dysuria and urinary frequency for the past several day. She states she also recently had an appointment with her cardiologist and is no longer taking Eliquis. She feels otherwise well and offers no other acute complaints or concerns. ROS as documented below.

## 2022-01-03 NOTE — REVIEW OF SYSTEMS
[Dysuria] : dysuria [Frequency] : frequency [Fever] : no fever [Chills] : no chills [Recent Change In Weight] : ~T no recent weight change [Vision Problems] : no vision problems [Earache] : no earache [Nasal Discharge] : no nasal discharge [Sore Throat] : no sore throat [Chest Pain] : no chest pain [Palpitations] : no palpitations [Shortness Of Breath] : no shortness of breath [Wheezing] : no wheezing [Abdominal Pain] : no abdominal pain [Nausea] : no nausea [Diarrhea] : diarrhea [Vomiting] : no vomiting [Hematuria] : no hematuria [Joint Pain] : no joint pain [Joint Swelling] : no joint swelling [Skin Rash] : no skin rash [Headache] : no headache [Dizziness] : no dizziness [Anxiety] : no anxiety [Depression] : no depression [Swollen Glands] : no swollen glands

## 2022-01-07 LAB — BACTERIA UR CULT: ABNORMAL

## 2022-01-10 ENCOUNTER — APPOINTMENT (OUTPATIENT)
Dept: INTERNAL MEDICINE | Facility: CLINIC | Age: 77
End: 2022-01-10

## 2022-02-17 ENCOUNTER — APPOINTMENT (OUTPATIENT)
Dept: INTERNAL MEDICINE | Facility: CLINIC | Age: 77
End: 2022-02-17
Payer: MEDICARE

## 2022-02-17 VITALS
WEIGHT: 125 LBS | TEMPERATURE: 98.3 F | BODY MASS INDEX: 23.6 KG/M2 | HEIGHT: 61 IN | DIASTOLIC BLOOD PRESSURE: 68 MMHG | HEART RATE: 78 BPM | SYSTOLIC BLOOD PRESSURE: 130 MMHG | OXYGEN SATURATION: 99 % | RESPIRATION RATE: 17 BRPM

## 2022-02-17 DIAGNOSIS — D72.829 ELEVATED WHITE BLOOD CELL COUNT, UNSPECIFIED: ICD-10-CM

## 2022-02-17 DIAGNOSIS — J06.9 ACUTE UPPER RESPIRATORY INFECTION, UNSPECIFIED: ICD-10-CM

## 2022-02-17 DIAGNOSIS — R79.89 OTHER SPECIFIED ABNORMAL FINDINGS OF BLOOD CHEMISTRY: ICD-10-CM

## 2022-02-17 PROCEDURE — 36415 COLL VENOUS BLD VENIPUNCTURE: CPT

## 2022-02-17 PROCEDURE — 99214 OFFICE O/P EST MOD 30 MIN: CPT | Mod: 25

## 2022-02-22 LAB
ALBUMIN SERPL ELPH-MCNC: 4.1 G/DL
ALP BLD-CCNC: 67 U/L
ALT SERPL-CCNC: 50 U/L
ANION GAP SERPL CALC-SCNC: 12 MMOL/L
AST SERPL-CCNC: 55 U/L
BASOPHILS # BLD AUTO: 0.02 K/UL
BASOPHILS NFR BLD AUTO: 0.2 %
BILIRUB SERPL-MCNC: 0.3 MG/DL
BUN SERPL-MCNC: 25 MG/DL
CALCIUM SERPL-MCNC: 9.2 MG/DL
CHLORIDE SERPL-SCNC: 102 MMOL/L
CO2 SERPL-SCNC: 24 MMOL/L
CREAT SERPL-MCNC: 1.14 MG/DL
EOSINOPHIL # BLD AUTO: 0 K/UL
EOSINOPHIL NFR BLD AUTO: 0 %
GLUCOSE SERPL-MCNC: 125 MG/DL
HCT VFR BLD CALC: 44.3 %
HGB BLD-MCNC: 13.5 G/DL
IMM GRANULOCYTES NFR BLD AUTO: 0.6 %
LYMPHOCYTES # BLD AUTO: 0.8 K/UL
LYMPHOCYTES NFR BLD AUTO: 7.2 %
MAN DIFF?: NORMAL
MCHC RBC-ENTMCNC: 28.4 PG
MCHC RBC-ENTMCNC: 30.5 GM/DL
MCV RBC AUTO: 93.3 FL
MONOCYTES # BLD AUTO: 0.33 K/UL
MONOCYTES NFR BLD AUTO: 3 %
NEUTROPHILS # BLD AUTO: 9.83 K/UL
NEUTROPHILS NFR BLD AUTO: 89 %
PLATELET # BLD AUTO: 240 K/UL
POTASSIUM SERPL-SCNC: 5 MMOL/L
PROT SERPL-MCNC: 5.8 G/DL
RBC # BLD: 4.75 M/UL
RBC # FLD: 15.9 %
SODIUM SERPL-SCNC: 139 MMOL/L
WBC # FLD AUTO: 11.05 K/UL

## 2022-02-23 PROBLEM — J06.9 URI, ACUTE: Status: RESOLVED | Noted: 2022-02-17 | Resolved: 2022-03-19

## 2022-02-23 PROBLEM — R79.89 LFT ELEVATION: Status: ACTIVE | Noted: 2021-09-21

## 2022-02-23 PROBLEM — D72.829 ELEVATED WBC COUNT: Status: ACTIVE | Noted: 2021-12-17

## 2022-02-23 NOTE — PHYSICAL EXAM
[No Acute Distress] : no acute distress [Well Nourished] : well nourished [Well Developed] : well developed [Well-Appearing] : well-appearing [Normal Sclera/Conjunctiva] : normal sclera/conjunctiva [PERRL] : pupils equal round and reactive to light [EOMI] : extraocular movements intact [Normal Outer Ear/Nose] : the outer ears and nose were normal in appearance [Normal Oropharynx] : the oropharynx was normal [No JVD] : no jugular venous distention [No Lymphadenopathy] : no lymphadenopathy [Supple] : supple [Thyroid Normal, No Nodules] : the thyroid was normal and there were no nodules present [No Respiratory Distress] : no respiratory distress  [No Accessory Muscle Use] : no accessory muscle use [Normal Rate] : normal rate  [Regular Rhythm] : with a regular rhythm [Normal S1, S2] : normal S1 and S2 [No Murmur] : no murmur heard [No Carotid Bruits] : no carotid bruits [No Abdominal Bruit] : a ~M bruit was not heard ~T in the abdomen [No Varicosities] : no varicosities [Pedal Pulses Present] : the pedal pulses are present [No Edema] : there was no peripheral edema [No Palpable Aorta] : no palpable aorta [No Extremity Clubbing/Cyanosis] : no extremity clubbing/cyanosis [Soft] : abdomen soft [Non Tender] : non-tender [Non-distended] : non-distended [No Masses] : no abdominal mass palpated [No HSM] : no HSM [Normal Bowel Sounds] : normal bowel sounds [Normal Posterior Cervical Nodes] : no posterior cervical lymphadenopathy [Normal Anterior Cervical Nodes] : no anterior cervical lymphadenopathy [No CVA Tenderness] : no CVA  tenderness [No Spinal Tenderness] : no spinal tenderness [No Joint Swelling] : no joint swelling [Grossly Normal Strength/Tone] : grossly normal strength/tone [No Rash] : no rash [Coordination Grossly Intact] : coordination grossly intact [No Focal Deficits] : no focal deficits [Normal Gait] : normal gait [Deep Tendon Reflexes (DTR)] : deep tendon reflexes were 2+ and symmetric [Normal Affect] : the affect was normal [Normal Insight/Judgement] : insight and judgment were intact [de-identified] : coarse breath sounds at bilateral bases, scant diffuse wheezes in bilateral lung fields

## 2022-02-23 NOTE — REVIEW OF SYSTEMS
[Wheezing] : wheezing [Cough] : cough [Fever] : no fever [Chills] : no chills [Recent Change In Weight] : ~T no recent weight change [Vision Problems] : no vision problems [Earache] : no earache [Nasal Discharge] : no nasal discharge [Sore Throat] : no sore throat [Chest Pain] : no chest pain [Palpitations] : no palpitations [Shortness Of Breath] : no shortness of breath [Abdominal Pain] : no abdominal pain [Nausea] : no nausea [Diarrhea] : diarrhea [Vomiting] : no vomiting [Dysuria] : no dysuria [Hematuria] : no hematuria [Frequency] : no frequency [Joint Pain] : no joint pain [Joint Swelling] : no joint swelling [Skin Rash] : no skin rash [Headache] : no headache [Dizziness] : no dizziness [Anxiety] : no anxiety [Depression] : no depression [Swollen Glands] : no swollen glands

## 2022-02-23 NOTE — HISTORY OF PRESENT ILLNESS
[FreeTextEntry1] : sick visit  [de-identified] : 75 y/o female presents with concerns for a several day history of productive cough / phlegm. She is also due for repeat blood work. She feels otherwise well and offers no other acute complaints or concerns. ROS as documented below.

## 2022-03-03 ENCOUNTER — NON-APPOINTMENT (OUTPATIENT)
Age: 77
End: 2022-03-03

## 2022-03-07 ENCOUNTER — APPOINTMENT (OUTPATIENT)
Dept: INTERNAL MEDICINE | Facility: CLINIC | Age: 77
End: 2022-03-07
Payer: MEDICARE

## 2022-03-07 VITALS
DIASTOLIC BLOOD PRESSURE: 80 MMHG | TEMPERATURE: 97.9 F | WEIGHT: 125 LBS | HEART RATE: 60 BPM | HEIGHT: 61 IN | BODY MASS INDEX: 23.6 KG/M2 | RESPIRATION RATE: 18 BRPM | SYSTOLIC BLOOD PRESSURE: 122 MMHG | OXYGEN SATURATION: 98 %

## 2022-03-07 DIAGNOSIS — Z79.2 LONG TERM (CURRENT) USE OF ANTIBIOTICS: ICD-10-CM

## 2022-03-07 DIAGNOSIS — I24.9 ACUTE ISCHEMIC HEART DISEASE, UNSPECIFIED: ICD-10-CM

## 2022-03-07 PROCEDURE — 99214 OFFICE O/P EST MOD 30 MIN: CPT | Mod: 25

## 2022-03-09 PROBLEM — Z79.2 PROPHYLACTIC ANTIBIOTIC: Status: ACTIVE | Noted: 2022-03-07

## 2022-03-09 PROBLEM — I24.9 ACS (ACUTE CORONARY SYNDROME): Status: RESOLVED | Noted: 2022-03-09 | Resolved: 2022-03-09

## 2022-03-09 NOTE — PHYSICAL EXAM
[No Acute Distress] : no acute distress [Well Nourished] : well nourished [Well Developed] : well developed [Well-Appearing] : well-appearing [Normal Sclera/Conjunctiva] : normal sclera/conjunctiva [PERRL] : pupils equal round and reactive to light [EOMI] : extraocular movements intact [Normal Outer Ear/Nose] : the outer ears and nose were normal in appearance [Normal Oropharynx] : the oropharynx was normal [No JVD] : no jugular venous distention [No Lymphadenopathy] : no lymphadenopathy [Supple] : supple [Thyroid Normal, No Nodules] : the thyroid was normal and there were no nodules present [No Respiratory Distress] : no respiratory distress  [No Accessory Muscle Use] : no accessory muscle use [Normal Rate] : normal rate  [Regular Rhythm] : with a regular rhythm [Normal S1, S2] : normal S1 and S2 [No Murmur] : no murmur heard [No Carotid Bruits] : no carotid bruits [No Abdominal Bruit] : a ~M bruit was not heard ~T in the abdomen [No Varicosities] : no varicosities [Pedal Pulses Present] : the pedal pulses are present [No Edema] : there was no peripheral edema [No Palpable Aorta] : no palpable aorta [No Extremity Clubbing/Cyanosis] : no extremity clubbing/cyanosis [Soft] : abdomen soft [Non Tender] : non-tender [Non-distended] : non-distended [No Masses] : no abdominal mass palpated [No HSM] : no HSM [Normal Bowel Sounds] : normal bowel sounds [Normal Posterior Cervical Nodes] : no posterior cervical lymphadenopathy [Normal Anterior Cervical Nodes] : no anterior cervical lymphadenopathy [No CVA Tenderness] : no CVA  tenderness [No Spinal Tenderness] : no spinal tenderness [No Joint Swelling] : no joint swelling [Grossly Normal Strength/Tone] : grossly normal strength/tone [No Rash] : no rash [Coordination Grossly Intact] : coordination grossly intact [No Focal Deficits] : no focal deficits [Normal Gait] : normal gait [Deep Tendon Reflexes (DTR)] : deep tendon reflexes were 2+ and symmetric [Normal Affect] : the affect was normal [Normal Insight/Judgement] : insight and judgment were intact

## 2022-03-14 NOTE — HISTORY OF PRESENT ILLNESS
[FreeTextEntry1] : hospital follow up [de-identified] : 75 y/o female presents for hospital discharge follow up. She was seen at [facility] from 2/24-2/28  for  ACS and had  5 stents placed. She is currently on Eliquis / Plavix now. She reports feeling  improved and denies chest pressure currently.  She has a cardiology follow up scheduled for next week. Patient also reports having a crack in her denture and she is requesting Rx for Clindamycin 300 mg 2 tabs 1 hour prior to dental procedure. She feels otherwise well and offers no other acute complaints or concerns. ROS as documented below.

## 2022-03-14 NOTE — REVIEW OF SYSTEMS
[Fever] : no fever [Chills] : no chills [Recent Change In Weight] : ~T no recent weight change [Vision Problems] : no vision problems [Earache] : no earache [Nasal Discharge] : no nasal discharge [Sore Throat] : no sore throat [Chest Pain] : no chest pain [Palpitations] : no palpitations [Shortness Of Breath] : no shortness of breath [Abdominal Pain] : no abdominal pain [Nausea] : no nausea [Diarrhea] : diarrhea [Vomiting] : no vomiting [Dysuria] : no dysuria [Hematuria] : no hematuria [Frequency] : no frequency [Joint Pain] : no joint pain [Joint Swelling] : no joint swelling [Skin Rash] : no skin rash [Headache] : no headache [Dizziness] : no dizziness [Anxiety] : no anxiety [Depression] : no depression [Swollen Glands] : no swollen glands

## 2022-03-16 ENCOUNTER — LABORATORY RESULT (OUTPATIENT)
Age: 77
End: 2022-03-16

## 2022-03-16 ENCOUNTER — APPOINTMENT (OUTPATIENT)
Dept: INTERNAL MEDICINE | Facility: CLINIC | Age: 77
End: 2022-03-16
Payer: MEDICARE

## 2022-03-16 DIAGNOSIS — R39.9 UNSPECIFIED SYMPTOMS AND SIGNS INVOLVING THE GENITOURINARY SYSTEM: ICD-10-CM

## 2022-03-16 PROCEDURE — 99213 OFFICE O/P EST LOW 20 MIN: CPT

## 2022-03-16 NOTE — REVIEW OF SYSTEMS
[Dysuria] : dysuria [Frequency] : frequency [Fever] : no fever [Chills] : no chills [Recent Change In Weight] : ~T no recent weight change [Vision Problems] : no vision problems [Earache] : no earache [Nasal Discharge] : no nasal discharge [Sore Throat] : no sore throat [Chest Pain] : no chest pain [Palpitations] : no palpitations [Shortness Of Breath] : no shortness of breath [Abdominal Pain] : no abdominal pain [Nausea] : no nausea [Diarrhea] : diarrhea [Vomiting] : no vomiting [Hematuria] : no hematuria [Joint Pain] : no joint pain [Joint Swelling] : no joint swelling [Skin Rash] : no skin rash [Headache] : no headache [Dizziness] : no dizziness [Anxiety] : no anxiety [Depression] : no depression [Swollen Glands] : no swollen glands

## 2022-03-16 NOTE — HISTORY OF PRESENT ILLNESS
[FreeTextEntry8] : 77 y/o female presents with concerns for dysuria and urinary frequency over the past few days.  She feels otherwise well and offers no other acute complaints or concerns. ROS as documented below.

## 2022-03-18 ENCOUNTER — NON-APPOINTMENT (OUTPATIENT)
Age: 77
End: 2022-03-18

## 2022-03-18 ENCOUNTER — APPOINTMENT (OUTPATIENT)
Dept: INTERNAL MEDICINE | Facility: CLINIC | Age: 77
End: 2022-03-18
Payer: MEDICARE

## 2022-03-18 VITALS
RESPIRATION RATE: 17 BRPM | DIASTOLIC BLOOD PRESSURE: 66 MMHG | TEMPERATURE: 98.7 F | OXYGEN SATURATION: 97 % | SYSTOLIC BLOOD PRESSURE: 110 MMHG | HEART RATE: 74 BPM

## 2022-03-18 PROCEDURE — 93000 ELECTROCARDIOGRAM COMPLETE: CPT

## 2022-03-18 PROCEDURE — 99213 OFFICE O/P EST LOW 20 MIN: CPT | Mod: 25

## 2022-03-18 RX ORDER — L. ACIDOPHILUS/L.BULGARICUS 1MM CELL
TABLET ORAL TWICE DAILY
Qty: 14 | Refills: 0 | Status: ACTIVE | COMMUNITY
Start: 2022-03-18 | End: 1900-01-01

## 2022-03-22 LAB
APPEARANCE: ABNORMAL
BILIRUBIN URINE: NEGATIVE
BLOOD URINE: NEGATIVE
COLOR: NORMAL
GLUCOSE QUALITATIVE U: NEGATIVE
KETONES URINE: NEGATIVE
LEUKOCYTE ESTERASE URINE: ABNORMAL
NITRITE URINE: POSITIVE
PH URINE: 6
PROTEIN URINE: NEGATIVE
SPECIFIC GRAVITY URINE: 1.01
UROBILINOGEN URINE: NORMAL

## 2022-03-23 NOTE — REVIEW OF SYSTEMS
[Chills] : chills [Dysuria] : dysuria [Frequency] : frequency [Fever] : no fever [Recent Change In Weight] : ~T no recent weight change [Vision Problems] : no vision problems [Earache] : no earache [Nasal Discharge] : no nasal discharge [Sore Throat] : no sore throat [Chest Pain] : no chest pain [Palpitations] : no palpitations [Shortness Of Breath] : no shortness of breath [Abdominal Pain] : no abdominal pain [Nausea] : no nausea [Diarrhea] : diarrhea [Vomiting] : no vomiting [Hematuria] : no hematuria [Joint Pain] : no joint pain [Joint Swelling] : no joint swelling [Skin Rash] : no skin rash [Headache] : no headache [Dizziness] : no dizziness [Depression] : no depression [Anxiety] : no anxiety [Swollen Glands] : no swollen glands

## 2022-03-26 ENCOUNTER — NON-APPOINTMENT (OUTPATIENT)
Age: 77
End: 2022-03-26

## 2022-03-27 ENCOUNTER — APPOINTMENT (OUTPATIENT)
Dept: INTERNAL MEDICINE | Facility: CLINIC | Age: 77
End: 2022-03-27

## 2022-03-27 DIAGNOSIS — Z87.440 PERSONAL HISTORY OF URINARY (TRACT) INFECTIONS: ICD-10-CM

## 2022-03-27 DIAGNOSIS — Z00.00 ENCOUNTER FOR GENERAL ADULT MEDICAL EXAMINATION W/OUT ABNORMAL FINDINGS: ICD-10-CM

## 2022-03-27 NOTE — HEALTH RISK ASSESSMENT
[Very Good] : ~his/her~  mood as very good [Never] : Never [Yes] : Yes [Monthly or less (1 pt)] : Monthly or less (1 point) [1 or 2 (0 pts)] : 1 or 2 (0 points) [0] : 2) Feeling down, depressed, or hopeless: Not at all (0) [PHQ-2 Negative - No further assessment needed] : PHQ-2 Negative - No further assessment needed [None] : None [Fully functional (bathing, dressing, toileting, transferring, walking, feeding)] : Fully functional (bathing, dressing, toileting, transferring, walking, feeding) [Fully functional (using the telephone, shopping, preparing meals, housekeeping, doing laundry, using] : Fully functional and needs no help or supervision to perform IADLs (using the telephone, shopping, preparing meals, housekeeping, doing laundry, using transportation, managing medications and managing finances) [de-identified] : None [Change in mental status noted] : No change in mental status noted [Reports changes in hearing] : Reports no changes in hearing [Reports changes in vision] : Reports no changes in vision [Reports changes in dental health] : Reports no changes in dental health

## 2022-03-27 NOTE — HISTORY OF PRESENT ILLNESS
[FreeTextEntry1] : Wellness [de-identified] : Was at the [place] emergency room on March 16 for urine issues, diagnosed with acute pyelonephritis given ceftriaxone, and Suprax to take home\par \par Otherwise well, no complaints\par \par

## 2022-03-27 NOTE — PHYSICAL EXAM
[No Acute Distress] : no acute distress [Well Nourished] : well nourished [Well Developed] : well developed [Well-Appearing] : well-appearing [Normal Sclera/Conjunctiva] : normal sclera/conjunctiva [PERRL] : pupils equal round and reactive to light [EOMI] : extraocular movements intact [Normal Outer Ear/Nose] : the outer ears and nose were normal in appearance [Normal Oropharynx] : the oropharynx was normal [No Lymphadenopathy] : no lymphadenopathy [Supple] : supple [Thyroid Normal, No Nodules] : the thyroid was normal and there were no nodules present [No Respiratory Distress] : no respiratory distress  [No Accessory Muscle Use] : no accessory muscle use [Clear to Auscultation] : lungs were clear to auscultation bilaterally [Normal Rate] : normal rate  [Regular Rhythm] : with a regular rhythm [Normal S1, S2] : normal S1 and S2 [No Murmur] : no murmur heard [No Edema] : there was no peripheral edema [No Extremity Clubbing/Cyanosis] : no extremity clubbing/cyanosis [Soft] : abdomen soft [Non Tender] : non-tender [Non-distended] : non-distended [No Masses] : no abdominal mass palpated [No HSM] : no HSM [Normal Bowel Sounds] : normal bowel sounds [Normal Posterior Cervical Nodes] : no posterior cervical lymphadenopathy [Normal Anterior Cervical Nodes] : no anterior cervical lymphadenopathy [No CVA Tenderness] : no CVA  tenderness [No Spinal Tenderness] : no spinal tenderness [No Joint Swelling] : no joint swelling [Grossly Normal Strength/Tone] : grossly normal strength/tone [No Rash] : no rash [Coordination Grossly Intact] : coordination grossly intact [No Focal Deficits] : no focal deficits [Normal Gait] : normal gait [Deep Tendon Reflexes (DTR)] : deep tendon reflexes were 2+ and symmetric [Normal Affect] : the affect was normal [Alert and Oriented x3] : oriented to person, place, and time [Normal Insight/Judgement] : insight and judgment were intact

## 2022-03-28 ENCOUNTER — LABORATORY RESULT (OUTPATIENT)
Age: 77
End: 2022-03-28

## 2022-03-28 ENCOUNTER — APPOINTMENT (OUTPATIENT)
Dept: INTERNAL MEDICINE | Facility: CLINIC | Age: 77
End: 2022-03-28
Payer: MEDICARE

## 2022-03-28 VITALS
WEIGHT: 125 LBS | OXYGEN SATURATION: 97 % | DIASTOLIC BLOOD PRESSURE: 84 MMHG | BODY MASS INDEX: 23.6 KG/M2 | RESPIRATION RATE: 17 BRPM | HEIGHT: 61 IN | TEMPERATURE: 97.6 F | HEART RATE: 75 BPM | SYSTOLIC BLOOD PRESSURE: 136 MMHG

## 2022-03-28 DIAGNOSIS — R05.8 OTHER SPECIFIED COUGH: ICD-10-CM

## 2022-03-28 PROCEDURE — 99213 OFFICE O/P EST LOW 20 MIN: CPT | Mod: 25

## 2022-03-28 PROCEDURE — 99496 TRANSJ CARE MGMT HIGH F2F 7D: CPT

## 2022-03-29 LAB
APPEARANCE: CLEAR
BILIRUBIN URINE: NEGATIVE
BLOOD URINE: NEGATIVE
COLOR: COLORLESS
GLUCOSE QUALITATIVE U: NEGATIVE
KETONES URINE: NEGATIVE
LEUKOCYTE ESTERASE URINE: ABNORMAL
NITRITE URINE: NEGATIVE
PH URINE: 6.5
PROTEIN URINE: NEGATIVE
SPECIFIC GRAVITY URINE: 1.01
UROBILINOGEN URINE: NORMAL

## 2022-03-31 PROBLEM — R05.8 PRODUCTIVE COUGH: Status: ACTIVE | Noted: 2021-09-21

## 2022-03-31 NOTE — HISTORY OF PRESENT ILLNESS
[FreeTextEntry1] : hospital follow up [de-identified] : 77 y/o female presents for hospital discharge follow up. She was admitted to [facility] from 3/18-3/22 for a UTI/urosepsis. She states she is feeling improved but has developed a productive cough over the past few days.  She feels otherwise well and offers no other acute complaints or concerns. ROS as documented below.

## 2022-03-31 NOTE — REVIEW OF SYSTEMS
[Fever] : no fever [Recent Change In Weight] : ~T no recent weight change [Vision Problems] : no vision problems [Earache] : no earache [Nasal Discharge] : no nasal discharge [Sore Throat] : no sore throat [Chest Pain] : no chest pain [Palpitations] : no palpitations [Shortness Of Breath] : no shortness of breath [Cough] : no cough [Abdominal Pain] : no abdominal pain [Nausea] : no nausea [Diarrhea] : diarrhea [Vomiting] : no vomiting [Dysuria] : no dysuria [Hematuria] : no hematuria [Frequency] : no frequency [Joint Pain] : no joint pain [Joint Swelling] : no joint swelling [Skin Rash] : no skin rash [Headache] : no headache [Dizziness] : no dizziness [Anxiety] : no anxiety [Depression] : no depression [Swollen Glands] : no swollen glands

## 2022-04-14 ENCOUNTER — NON-APPOINTMENT (OUTPATIENT)
Age: 77
End: 2022-04-14

## 2022-04-14 DIAGNOSIS — M79.89 OTHER SPECIFIED SOFT TISSUE DISORDERS: ICD-10-CM

## 2022-04-18 ENCOUNTER — APPOINTMENT (OUTPATIENT)
Dept: UROLOGY | Facility: CLINIC | Age: 77
End: 2022-04-18

## 2022-04-19 ENCOUNTER — LABORATORY RESULT (OUTPATIENT)
Age: 77
End: 2022-04-19

## 2022-04-19 ENCOUNTER — APPOINTMENT (OUTPATIENT)
Dept: INTERNAL MEDICINE | Facility: CLINIC | Age: 77
End: 2022-04-19
Payer: MEDICARE

## 2022-04-19 VITALS
HEART RATE: 73 BPM | OXYGEN SATURATION: 98 % | RESPIRATION RATE: 18 BRPM | WEIGHT: 125 LBS | HEIGHT: 61 IN | TEMPERATURE: 97.7 F | DIASTOLIC BLOOD PRESSURE: 68 MMHG | BODY MASS INDEX: 23.6 KG/M2 | SYSTOLIC BLOOD PRESSURE: 134 MMHG

## 2022-04-19 PROCEDURE — 81003 URINALYSIS AUTO W/O SCOPE: CPT | Mod: QW

## 2022-04-19 PROCEDURE — 99214 OFFICE O/P EST MOD 30 MIN: CPT | Mod: 25

## 2022-04-19 RX ORDER — NITROFURANTOIN (MONOHYDRATE/MACROCRYSTALS) 25; 75 MG/1; MG/1
100 CAPSULE ORAL
Qty: 14 | Refills: 0 | Status: ACTIVE | COMMUNITY
Start: 2022-01-04 | End: 1900-01-01

## 2022-04-20 ENCOUNTER — LABORATORY RESULT (OUTPATIENT)
Age: 77
End: 2022-04-20

## 2022-04-20 LAB
APPEARANCE: ABNORMAL
BILIRUB UR QL STRIP: NEGATIVE
BILIRUBIN URINE: NEGATIVE
BLOOD URINE: NORMAL
COLOR: YELLOW
GLUCOSE QUALITATIVE U: NEGATIVE
GLUCOSE UR-MCNC: NEGATIVE
HCG UR QL: 1 EU/DL
HGB UR QL STRIP.AUTO: NORMAL
KETONES UR-MCNC: NEGATIVE
KETONES URINE: NEGATIVE
LEUKOCYTE ESTERASE UR QL STRIP: NORMAL
LEUKOCYTE ESTERASE URINE: ABNORMAL
NITRITE UR QL STRIP: POSITIVE
NITRITE URINE: NEGATIVE
PH UR STRIP: 6.5
PH URINE: 6.5
PROT UR STRIP-MCNC: NEGATIVE
PROTEIN URINE: NORMAL
SP GR UR STRIP: 1.02
SPECIFIC GRAVITY URINE: 1.02
UROBILINOGEN URINE: NORMAL

## 2022-04-25 ENCOUNTER — NON-APPOINTMENT (OUTPATIENT)
Age: 77
End: 2022-04-25

## 2022-04-25 NOTE — REVIEW OF SYSTEMS
[Fever] : no fever [Recent Change In Weight] : ~T no recent weight change [Vision Problems] : no vision problems [Earache] : no earache [Nasal Discharge] : no nasal discharge [Sore Throat] : no sore throat [Chest Pain] : no chest pain [Palpitations] : no palpitations [Shortness Of Breath] : no shortness of breath [Cough] : no cough [Abdominal Pain] : no abdominal pain [Nausea] : no nausea [Diarrhea] : diarrhea [Vomiting] : no vomiting [Dysuria] : dysuria [Hematuria] : no hematuria [Frequency] : frequency [Joint Pain] : no joint pain [Joint Swelling] : no joint swelling [Skin Rash] : no skin rash [Headache] : no headache [Dizziness] : no dizziness [Anxiety] : no anxiety [Depression] : no depression [Swollen Glands] : no swollen glands

## 2022-04-28 ENCOUNTER — NON-APPOINTMENT (OUTPATIENT)
Age: 77
End: 2022-04-28

## 2022-05-03 ENCOUNTER — APPOINTMENT (OUTPATIENT)
Dept: INTERNAL MEDICINE | Facility: CLINIC | Age: 77
End: 2022-05-03
Payer: MEDICARE

## 2022-05-03 VITALS
TEMPERATURE: 98.1 F | DIASTOLIC BLOOD PRESSURE: 78 MMHG | WEIGHT: 123 LBS | OXYGEN SATURATION: 98 % | SYSTOLIC BLOOD PRESSURE: 112 MMHG | HEIGHT: 61 IN | HEART RATE: 68 BPM | BODY MASS INDEX: 23.22 KG/M2 | RESPIRATION RATE: 18 BRPM

## 2022-05-03 DIAGNOSIS — J18.9 PNEUMONIA, UNSPECIFIED ORGANISM: ICD-10-CM

## 2022-05-03 PROCEDURE — 90732 PPSV23 VACC 2 YRS+ SUBQ/IM: CPT

## 2022-05-03 PROCEDURE — 99495 TRANSJ CARE MGMT MOD F2F 14D: CPT | Mod: 25

## 2022-05-03 PROCEDURE — G0009: CPT

## 2022-05-03 PROCEDURE — 99214 OFFICE O/P EST MOD 30 MIN: CPT | Mod: 25

## 2022-05-06 LAB
ALBUMIN SERPL ELPH-MCNC: 3.9 G/DL
ALP BLD-CCNC: 68 U/L
ALT SERPL-CCNC: 12 U/L
ANION GAP SERPL CALC-SCNC: 12 MMOL/L
AST SERPL-CCNC: 16 U/L
BASOPHILS # BLD AUTO: 0.14 K/UL
BASOPHILS NFR BLD AUTO: 1 %
BILIRUB SERPL-MCNC: 0.2 MG/DL
BUN SERPL-MCNC: 27 MG/DL
CALCIUM SERPL-MCNC: 8.9 MG/DL
CHLORIDE SERPL-SCNC: 104 MMOL/L
CO2 SERPL-SCNC: 24 MMOL/L
CREAT SERPL-MCNC: 0.95 MG/DL
EGFR: 62 ML/MIN/1.73M2
EOSINOPHIL # BLD AUTO: 0.15 K/UL
EOSINOPHIL NFR BLD AUTO: 1.1 %
GLUCOSE SERPL-MCNC: 100 MG/DL
HCT VFR BLD CALC: 40.8 %
HGB BLD-MCNC: 12.3 G/DL
IMM GRANULOCYTES NFR BLD AUTO: 1.4 %
LYMPHOCYTES # BLD AUTO: 1.59 K/UL
LYMPHOCYTES NFR BLD AUTO: 11.7 %
MAN DIFF?: NORMAL
MCHC RBC-ENTMCNC: 27.7 PG
MCHC RBC-ENTMCNC: 30.1 GM/DL
MCV RBC AUTO: 91.9 FL
MONOCYTES # BLD AUTO: 0.87 K/UL
MONOCYTES NFR BLD AUTO: 6.4 %
NEUTROPHILS # BLD AUTO: 10.61 K/UL
NEUTROPHILS NFR BLD AUTO: 78.4 %
PLATELET # BLD AUTO: 205 K/UL
POTASSIUM SERPL-SCNC: 4.4 MMOL/L
PROT SERPL-MCNC: 5.4 G/DL
RBC # BLD: 4.44 M/UL
RBC # FLD: 16.4 %
SODIUM SERPL-SCNC: 140 MMOL/L
WBC # FLD AUTO: 13.55 K/UL

## 2022-05-10 PROBLEM — J18.9 PNEUMONIA: Status: ACTIVE | Noted: 2022-05-10

## 2022-05-10 NOTE — HISTORY OF PRESENT ILLNESS
[Post-hospitalization from ___ Hospital] : Post-hospitalization from [unfilled] Hospital [Admitted on: ___] : The patient was admitted on [unfilled] [Discharged on ___] : discharged on [unfilled] [FreeTextEntry2] : Hospital Discharge followup for recent pneumonia\par feeling better today\par

## 2022-05-10 NOTE — REVIEW OF SYSTEMS
[Negative] : Heme/Lymph [Chills] : no chills [Fatigue] : no fatigue [Discharge] : no discharge [Pain] : no pain [Vision Problems] : no vision problems [Itching] : no itching [Earache] : no earache [Hearing Loss] : no hearing loss [Nasal Discharge] : no nasal discharge [Sore Throat] : no sore throat [Chest Pain] : no chest pain [Palpitations] : no palpitations [Shortness Of Breath] : no shortness of breath [Dyspnea on Exertion] : no dyspnea on exertion [Abdominal Pain] : no abdominal pain [Nausea] : no nausea [Vomiting] : no vomiting [Heartburn] : no heartburn [Dysuria] : no dysuria [Hematuria] : no hematuria [Joint Pain] : no joint pain [Muscle Pain] : no muscle pain [Itching] : no itching [Skin Rash] : no skin rash [Headache] : no headache [Dizziness] : no dizziness [Anxiety] : no anxiety [Depression] : no depression [Easy Bleeding] : no easy bleeding [Easy Bruising] : no easy bruising [FreeTextEntry6] : intermittent mild wheeze

## 2022-05-31 NOTE — HISTORY OF PRESENT ILLNESS
[FreeTextEntry1] : UTI / Chills [de-identified] : Patient reports she had not yet started taking the antibiotics she was prescribed for her UTI, except a singel dose taken this moring.  this morning she felt chills and body aches\par \par VSS currently\par advised we may need to switch to stronger abx.  however pt reports her stomach cannot tolerate other antibiotics.\par Advised she may need IV ABX /  hospitla care if symptoms recur. report to ED.  Patient

## 2022-06-08 ENCOUNTER — APPOINTMENT (OUTPATIENT)
Dept: INTERNAL MEDICINE | Facility: CLINIC | Age: 77
End: 2022-06-08

## 2022-06-12 DIAGNOSIS — Z92.89 PERSONAL HISTORY OF OTHER MEDICAL TREATMENT: ICD-10-CM

## 2022-06-14 ENCOUNTER — APPOINTMENT (OUTPATIENT)
Dept: INTERNAL MEDICINE | Facility: CLINIC | Age: 77
End: 2022-06-14
Payer: MEDICARE

## 2022-06-14 VITALS
DIASTOLIC BLOOD PRESSURE: 80 MMHG | OXYGEN SATURATION: 100 % | SYSTOLIC BLOOD PRESSURE: 118 MMHG | WEIGHT: 125 LBS | HEIGHT: 61 IN | RESPIRATION RATE: 18 BRPM | BODY MASS INDEX: 23.6 KG/M2 | HEART RATE: 66 BPM | TEMPERATURE: 96.9 F

## 2022-06-14 DIAGNOSIS — J45.909 UNSPECIFIED ASTHMA, UNCOMPLICATED: ICD-10-CM

## 2022-06-14 PROCEDURE — 36415 COLL VENOUS BLD VENIPUNCTURE: CPT

## 2022-06-14 PROCEDURE — G0439: CPT

## 2022-06-14 PROCEDURE — 99214 OFFICE O/P EST MOD 30 MIN: CPT | Mod: 25

## 2022-06-14 RX ORDER — AZITHROMYCIN 250 MG/1
250 TABLET, FILM COATED ORAL
Qty: 1 | Refills: 0 | Status: DISCONTINUED | COMMUNITY
Start: 2022-02-17 | End: 2022-06-14

## 2022-06-14 RX ORDER — METOPROLOL TARTRATE 25 MG/1
25 TABLET, FILM COATED ORAL TWICE DAILY
Qty: 180 | Refills: 0 | Status: ACTIVE | COMMUNITY
Start: 2022-06-14

## 2022-06-14 RX ORDER — FUROSEMIDE 20 MG/1
20 TABLET ORAL
Qty: 90 | Refills: 0 | Status: ACTIVE | COMMUNITY
Start: 2022-06-14

## 2022-06-14 RX ORDER — NITROFURANTOIN (MONOHYDRATE/MACROCRYSTALS) 25; 75 MG/1; MG/1
100 CAPSULE ORAL
Qty: 14 | Refills: 0 | Status: DISCONTINUED | COMMUNITY
Start: 2022-03-16 | End: 2022-06-14

## 2022-06-14 RX ORDER — CLINDAMYCIN HYDROCHLORIDE 300 MG/1
300 CAPSULE ORAL
Qty: 2 | Refills: 0 | Status: DISCONTINUED | COMMUNITY
Start: 2022-03-07 | End: 2022-06-14

## 2022-06-14 RX ORDER — CLOPIDOGREL BISULFATE 75 MG/1
75 TABLET, FILM COATED ORAL
Qty: 90 | Refills: 0 | Status: ACTIVE | COMMUNITY
Start: 2022-06-14

## 2022-06-14 RX ORDER — AMIODARONE HYDROCHLORIDE 100 MG/1
100 TABLET ORAL DAILY
Qty: 90 | Refills: 0 | Status: ACTIVE | COMMUNITY
Start: 2022-06-14

## 2022-06-16 PROBLEM — J45.909 ASTHMA: Status: ACTIVE | Noted: 2021-09-28

## 2022-06-16 NOTE — HISTORY OF PRESENT ILLNESS
[FreeTextEntry1] : annual wellness  [de-identified] : 77 y/o female presents for annual wellness exam. She feels otherwise well and offers no other acute complaints or concerns. ROS as documented below.

## 2022-06-16 NOTE — REVIEW OF SYSTEMS
[Negative] : Heme/Lymph [Chills] : no chills [Fatigue] : no fatigue [Discharge] : no discharge [Pain] : no pain [Vision Problems] : no vision problems [Itching] : no itching [Earache] : no earache [Hearing Loss] : no hearing loss [Nasal Discharge] : no nasal discharge [Sore Throat] : no sore throat [Chest Pain] : no chest pain [Palpitations] : no palpitations [Shortness Of Breath] : no shortness of breath [Wheezing] : no wheezing [Dyspnea on Exertion] : no dyspnea on exertion [Abdominal Pain] : no abdominal pain [Nausea] : no nausea [Vomiting] : no vomiting [Dysuria] : no dysuria [Heartburn] : no heartburn [Hematuria] : no hematuria [Joint Pain] : no joint pain [Muscle Pain] : no muscle pain [Skin Rash] : no skin rash [Headache] : no headache [Dizziness] : no dizziness [Anxiety] : no anxiety [Depression] : no depression [Easy Bleeding] : no easy bleeding [Easy Bruising] : no easy bruising

## 2022-06-16 NOTE — HEALTH RISK ASSESSMENT
[Never] : Never [No] : No [0] : 2) Feeling down, depressed, or hopeless: Not at all (0) [PHQ-2 Negative - No further assessment needed] : PHQ-2 Negative - No further assessment needed [[id number]] : 0

## 2022-06-17 LAB
25(OH)D3 SERPL-MCNC: 44.8 NG/ML
ALBUMIN SERPL ELPH-MCNC: 4.1 G/DL
ALP BLD-CCNC: 63 U/L
ALT SERPL-CCNC: 8 U/L
ANION GAP SERPL CALC-SCNC: 11 MMOL/L
AST SERPL-CCNC: 18 U/L
BASOPHILS # BLD AUTO: 0.07 K/UL
BASOPHILS NFR BLD AUTO: 0.8 %
BILIRUB SERPL-MCNC: 0.4 MG/DL
BUN SERPL-MCNC: 25 MG/DL
CALCIUM SERPL-MCNC: 9.4 MG/DL
CHLORIDE SERPL-SCNC: 104 MMOL/L
CHOLEST SERPL-MCNC: 185 MG/DL
CO2 SERPL-SCNC: 28 MMOL/L
CREAT SERPL-MCNC: 1.08 MG/DL
EGFR: 53 ML/MIN/1.73M2
EOSINOPHIL # BLD AUTO: 0.22 K/UL
EOSINOPHIL NFR BLD AUTO: 2.5 %
ESTIMATED AVERAGE GLUCOSE: 117 MG/DL
GLUCOSE SERPL-MCNC: 88 MG/DL
HBA1C MFR BLD HPLC: 5.7 %
HCT VFR BLD CALC: 46.4 %
HDLC SERPL-MCNC: 76 MG/DL
HGB BLD-MCNC: 13.9 G/DL
IMM GRANULOCYTES NFR BLD AUTO: 0.8 %
LDLC SERPL CALC-MCNC: 89 MG/DL
LYMPHOCYTES # BLD AUTO: 2.86 K/UL
LYMPHOCYTES NFR BLD AUTO: 32.1 %
MAN DIFF?: NORMAL
MCHC RBC-ENTMCNC: 27.8 PG
MCHC RBC-ENTMCNC: 30 GM/DL
MCV RBC AUTO: 92.8 FL
MONOCYTES # BLD AUTO: 0.98 K/UL
MONOCYTES NFR BLD AUTO: 11 %
NEUTROPHILS # BLD AUTO: 4.72 K/UL
NEUTROPHILS NFR BLD AUTO: 52.8 %
NONHDLC SERPL-MCNC: 109 MG/DL
PLATELET # BLD AUTO: 165 K/UL
POTASSIUM SERPL-SCNC: 4 MMOL/L
PROT SERPL-MCNC: 6.1 G/DL
RBC # BLD: 5 M/UL
RBC # FLD: 16.6 %
SODIUM SERPL-SCNC: 142 MMOL/L
TRIGL SERPL-MCNC: 98 MG/DL
TSH SERPL-ACNC: 4.52 UIU/ML
WBC # FLD AUTO: 8.92 K/UL

## 2022-07-04 NOTE — ASU PREOP CHECKLIST - WEIGHT IN KG
Refill Authorization Note   Brittny Urias  is requesting a refill authorization.  Brief Assessment and Rationale for Refill:  Approve     Medication Therapy Plan:       Medication Reconciliation Completed: No   Comments:     No Care Gaps recommended.     Note composed:2:06 PM 07/04/2022           56.7

## 2022-08-14 ENCOUNTER — APPOINTMENT (OUTPATIENT)
Dept: INTERNAL MEDICINE | Facility: CLINIC | Age: 77
End: 2022-08-14

## 2022-08-14 VITALS
BODY MASS INDEX: 23.79 KG/M2 | RESPIRATION RATE: 18 BRPM | SYSTOLIC BLOOD PRESSURE: 120 MMHG | DIASTOLIC BLOOD PRESSURE: 70 MMHG | WEIGHT: 126 LBS | OXYGEN SATURATION: 99 % | HEIGHT: 61 IN | HEART RATE: 67 BPM

## 2022-08-14 DIAGNOSIS — R60.0 LOCALIZED EDEMA: ICD-10-CM

## 2022-08-14 PROCEDURE — 99214 OFFICE O/P EST MOD 30 MIN: CPT

## 2022-08-16 NOTE — HISTORY OF PRESENT ILLNESS
[FreeTextEntry8] : right arm pain, got swollen only in last 2 days, ?? itching, not sure if she got bit

## 2022-08-16 NOTE — PHYSICAL EXAM
[Normal] : no respiratory distress, lungs were clear to auscultation bilaterally and no accessory muscle use [de-identified] : right volar forearm edema, no erthema, slightly warm, not really hurting.

## 2022-08-19 ENCOUNTER — APPOINTMENT (OUTPATIENT)
Dept: INTERNAL MEDICINE | Facility: CLINIC | Age: 77
End: 2022-08-19

## 2022-08-19 VITALS
HEIGHT: 61 IN | TEMPERATURE: 97.5 F | BODY MASS INDEX: 23.79 KG/M2 | SYSTOLIC BLOOD PRESSURE: 130 MMHG | OXYGEN SATURATION: 98 % | DIASTOLIC BLOOD PRESSURE: 80 MMHG | RESPIRATION RATE: 18 BRPM | HEART RATE: 69 BPM | WEIGHT: 126 LBS

## 2022-08-19 DIAGNOSIS — L03.90 CELLULITIS, UNSPECIFIED: ICD-10-CM

## 2022-08-19 PROCEDURE — 99214 OFFICE O/P EST MOD 30 MIN: CPT

## 2022-08-29 PROBLEM — L03.90 CELLULITIS: Status: ACTIVE | Noted: 2022-08-19

## 2022-08-29 NOTE — PHYSICAL EXAM
[No Acute Distress] : no acute distress [Well Nourished] : well nourished [Well Developed] : well developed [Well-Appearing] : well-appearing [Normal Sclera/Conjunctiva] : normal sclera/conjunctiva [PERRL] : pupils equal round and reactive to light [EOMI] : extraocular movements intact [Normal Outer Ear/Nose] : the outer ears and nose were normal in appearance [Normal Oropharynx] : the oropharynx was normal [No JVD] : no jugular venous distention [No Lymphadenopathy] : no lymphadenopathy [Supple] : supple [Thyroid Normal, No Nodules] : the thyroid was normal and there were no nodules present [No Respiratory Distress] : no respiratory distress  [No Accessory Muscle Use] : no accessory muscle use [Clear to Auscultation] : lungs were clear to auscultation bilaterally [Normal Rate] : normal rate  [Regular Rhythm] : with a regular rhythm [Normal S1, S2] : normal S1 and S2 [No Murmur] : no murmur heard [No Carotid Bruits] : no carotid bruits [No Abdominal Bruit] : a ~M bruit was not heard ~T in the abdomen [No Varicosities] : no varicosities [Pedal Pulses Present] : the pedal pulses are present [No Edema] : there was no peripheral edema [No Palpable Aorta] : no palpable aorta [No Extremity Clubbing/Cyanosis] : no extremity clubbing/cyanosis [Soft] : abdomen soft [Non Tender] : non-tender [Non-distended] : non-distended [No Masses] : no abdominal mass palpated [No HSM] : no HSM [Normal Bowel Sounds] : normal bowel sounds [Normal Posterior Cervical Nodes] : no posterior cervical lymphadenopathy [Normal Anterior Cervical Nodes] : no anterior cervical lymphadenopathy [No CVA Tenderness] : no CVA  tenderness [No Spinal Tenderness] : no spinal tenderness [No Joint Swelling] : no joint swelling [Grossly Normal Strength/Tone] : grossly normal strength/tone [Coordination Grossly Intact] : coordination grossly intact [No Focal Deficits] : no focal deficits [Normal Gait] : normal gait [Deep Tendon Reflexes (DTR)] : deep tendon reflexes were 2+ and symmetric [Normal Affect] : the affect was normal [Normal Insight/Judgement] : insight and judgment were intact [de-identified] : right elbow cellulitis

## 2022-08-29 NOTE — HISTORY OF PRESENT ILLNESS
[FreeTextEntry1] : Skin Rash  /  Followup [de-identified] : 78 yo F presents for followup and stating that she has an infection on her right elbow.

## 2022-08-29 NOTE — REVIEW OF SYSTEMS
[Skin Rash] : skin rash [Negative] : Heme/Lymph [Chills] : no chills [Fatigue] : no fatigue [Discharge] : no discharge [Pain] : no pain [Vision Problems] : no vision problems [Itching] : no itching [Earache] : no earache [Hearing Loss] : no hearing loss [Nasal Discharge] : no nasal discharge [Sore Throat] : no sore throat [Chest Pain] : no chest pain [Palpitations] : no palpitations [Shortness Of Breath] : no shortness of breath [Wheezing] : no wheezing [Dyspnea on Exertion] : no dyspnea on exertion [Abdominal Pain] : no abdominal pain [Nausea] : no nausea [Vomiting] : no vomiting [Heartburn] : no heartburn [Dysuria] : no dysuria [Hematuria] : no hematuria [Joint Pain] : no joint pain [Muscle Pain] : no muscle pain [Itching] : no itching [Headache] : no headache [Dizziness] : no dizziness [Anxiety] : no anxiety [Depression] : no depression [Easy Bleeding] : no easy bleeding [Easy Bruising] : no easy bruising [de-identified] : right elbow

## 2022-10-13 ENCOUNTER — NON-APPOINTMENT (OUTPATIENT)
Age: 77
End: 2022-10-13

## 2022-10-21 ENCOUNTER — APPOINTMENT (OUTPATIENT)
Dept: INTERNAL MEDICINE | Facility: CLINIC | Age: 77
End: 2022-10-21

## 2022-10-21 VITALS
RESPIRATION RATE: 18 BRPM | TEMPERATURE: 97.1 F | DIASTOLIC BLOOD PRESSURE: 80 MMHG | WEIGHT: 125 LBS | BODY MASS INDEX: 23.6 KG/M2 | SYSTOLIC BLOOD PRESSURE: 120 MMHG | HEIGHT: 61 IN | OXYGEN SATURATION: 99 % | HEART RATE: 63 BPM

## 2022-10-21 DIAGNOSIS — Z01.818 ENCOUNTER FOR OTHER PREPROCEDURAL EXAMINATION: ICD-10-CM

## 2022-10-21 LAB
BILIRUB UR QL STRIP: NEGATIVE
GLUCOSE UR-MCNC: NEGATIVE
HCG UR QL: 0.2 EU/DL
HGB UR QL STRIP.AUTO: NORMAL
KETONES UR-MCNC: NEGATIVE
LEUKOCYTE ESTERASE UR QL STRIP: NORMAL
NITRITE UR QL STRIP: NEGATIVE
PH UR STRIP: 5
PROT UR STRIP-MCNC: NEGATIVE
SP GR UR STRIP: 1.02

## 2022-10-21 PROCEDURE — 90662 IIV NO PRSV INCREASED AG IM: CPT

## 2022-10-21 PROCEDURE — 99214 OFFICE O/P EST MOD 30 MIN: CPT | Mod: 25

## 2022-10-21 PROCEDURE — 81003 URINALYSIS AUTO W/O SCOPE: CPT | Mod: QW

## 2022-10-21 PROCEDURE — G0008: CPT

## 2022-10-21 NOTE — REVIEW OF SYSTEMS
[Vision Problems] : vision problems [Negative] : Heme/Lymph [Chills] : no chills [Fatigue] : no fatigue [Discharge] : no discharge [Pain] : no pain [Itching] : no itching [Earache] : no earache [Hearing Loss] : no hearing loss [Nasal Discharge] : no nasal discharge [Sore Throat] : no sore throat [Chest Pain] : no chest pain [Palpitations] : no palpitations [Shortness Of Breath] : no shortness of breath [Wheezing] : no wheezing [Dyspnea on Exertion] : no dyspnea on exertion [Abdominal Pain] : no abdominal pain [Nausea] : no nausea [Vomiting] : no vomiting [Heartburn] : no heartburn [Dysuria] : no dysuria [Hematuria] : no hematuria [Joint Pain] : no joint pain [Muscle Pain] : no muscle pain [Skin Rash] : no skin rash [Headache] : no headache [Dizziness] : no dizziness [Anxiety] : no anxiety [Depression] : no depression [Easy Bleeding] : no easy bleeding [Easy Bruising] : no easy bruising

## 2022-10-21 NOTE — HISTORY OF PRESENT ILLNESS
[FreeTextEntry1] : Preoperative Examination [de-identified] : 76 yo F presents for Preoperative Exam prior to Left eye Cataract surgery scheduled for Nov 1st.  \par She also had a positive cologard and will be planning for colonoscopy.  to be done in hospital given her cardiac history.  She has already seen her Cardiologist and been cleared.  Consult reports reviewed.

## 2022-10-24 NOTE — OCCUPATIONAL THERAPY INITIAL EVALUATION ADULT - LEVEL OF INDEPENDENCE: SIT/STAND, REHAB EVAL
I called patient back, he is agreeable to new medication. Rx sent and Cariprazine discontinued.    Appointment made for 11/3 and patient would also like to discuss change in stimulant medication.     Orders Placed This Encounter   • benztropine (COGENTIN) 2 MG tablet     Sig: Take 1 tablet by mouth daily.     Dispense:  30 tablet     Refill:  1      contact guard

## 2022-10-30 ENCOUNTER — RX RENEWAL (OUTPATIENT)
Age: 77
End: 2022-10-30

## 2022-11-21 NOTE — ED PROVIDER NOTE - CPE EDP GASTRO NORM
Physical Therapy    Visit Type: treatment  Precautions:  Medical precautions:  fall risk;. Therapist donned gloves and procedural mask prior to entering patient's room.Pt donned procedural mask.     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  Lower Extremity:    Left:  weight bearing: as tolerated.  SUBJECTIVE  Patient agreed to participate in therapy this date.  It's not pain, just sore.   Patient / Family Goal: return home and return to previous functional status     OBJECTIVE     Cognitive Status   Orientation    - Oriented to: person, place, time and situation  Functional Communication   - Overall Status: within functional limits  Attention Span    - Attention: intact  Following Direction   - follows all commands and directions consistently  Transition Between Tasks   - transitions without difficulty  Memory   - intact  Safety Awareness/Insight   - intact  Awareness of Deficits   - fully aware of deficits        Bed Mobility  - Supine to sit: modified independent  Transfers  Assistive devices: gait belt, 2-wheeled walker  - Sit to stand: modified independent  - Stand to sit: modified independent    Ambulation / Gait  - Weight Bearing:  • Left: weight bearing as tolerated  - Assistive device: gait belt and two-wheeled walker  - Distance (feet unless otherwise indicated): 90, 90  - Assist Level: stand by assist  - Surface: even  - Description: step through    Stair Ambulation  - Number of steps: standard flight  Ascend  - Assist Level: gait belt used and stand by assist  - Pattern: step-to  - Railing: bilateral  Descend   - Assist Level: gait belt used and stand by assist  - Pattern: step-to  - Railing: bilateral      Interventions     Seated    Lower Extremity: Bilateral: knee extensions, knee flexion, heel raises and toe raises, AROM, 10 reps, 2 sets          ASSESSMENT   Impairments: strength, activity tolerance and endurance  Functional Limitations: ambulation and stair climbing  Nursing cleared pt for PT. Pt is AA O x 3,  Cooperative , no c/o pain  Admits to soreness.   pt  On modified I supine - sit and   sit - stand.  .pt amb 100 ft x 2 on SBA  with RW. Negotiated 4 step stairs on SBA with faizan rail support. pt demonstrated progress , achieved bed and transfer goals.  pt educated on safety , pt reports possibility of DC today.  pt will benefit from cont PT to maximize I and improve safety,         Discharge Recommendations  Recommendation for Discharge: PT IL: Patient is appropriate for Physical Therapy 1-3 times per week, Patient requires  intermittent assistance to perform mobility and/or ADLs safely  PT/OT Mobility Equipment for Discharge: Pt has RW.  PT/OT ADL Equipment for Discharge: AE to be introduced and recommended along with 3 in 1 BSC    Progress: improving as expected and goals met    • Skilled therapy is required to address these limitations in attempt to maximize the patient's independence.    Education:   - Present and ready to learn: patient  Education provided during session:  - Results of above outlined education: Verbalizes understanding and Demonstrates understanding    Patient at End of Session:   Location: in bed  Safety measures: bed rails x2 and lines intact  Handoff to: nurse    PLAN   Suggestions for next session as indicated: PT Frequency: 3-5 x per week  Frequency Comments: 11/21 ND PT    Interventions: balance, body mechanics, compensatory technique education, energy conservation, gait training, bed mobility, safety education, ROM, stairs retraining and functional transfer training  Agreement to plan and goals: patient agrees with goals and treatment plan        GOALS  Long Term Goals: (to be met by time of discharge from hospital)  Supine to sit: Patient will complete supine to sit modified independent.  Status: met   Sit to stand:  Patient will complete sit to stand transfer with 2-wheeled walker, modified independent.   Status: met   Ambulation (even): Patient will ambulate on even surface for 150 feet with 2-wheeled walker, stand by assist.   Status: progressing/ongoing    Documented in the chart in the following areas: Assessment.      Therapy procedure time and total treatment time can be found documented on the Time Entry flowsheet   normal...

## 2022-11-28 ENCOUNTER — RX RENEWAL (OUTPATIENT)
Age: 77
End: 2022-11-28

## 2022-12-13 ENCOUNTER — LABORATORY RESULT (OUTPATIENT)
Age: 77
End: 2022-12-13

## 2022-12-13 ENCOUNTER — APPOINTMENT (OUTPATIENT)
Dept: INTERNAL MEDICINE | Facility: CLINIC | Age: 77
End: 2022-12-13

## 2022-12-13 VITALS
RESPIRATION RATE: 17 BRPM | OXYGEN SATURATION: 97 % | HEART RATE: 66 BPM | SYSTOLIC BLOOD PRESSURE: 120 MMHG | WEIGHT: 125 LBS | DIASTOLIC BLOOD PRESSURE: 80 MMHG | HEIGHT: 61 IN | TEMPERATURE: 97.5 F | BODY MASS INDEX: 23.6 KG/M2

## 2022-12-13 DIAGNOSIS — U07.1 COVID-19: ICD-10-CM

## 2022-12-13 DIAGNOSIS — J22 UNSPECIFIED ACUTE LOWER RESPIRATORY INFECTION: ICD-10-CM

## 2022-12-13 PROCEDURE — 81003 URINALYSIS AUTO W/O SCOPE: CPT | Mod: QW

## 2022-12-13 PROCEDURE — 99214 OFFICE O/P EST MOD 30 MIN: CPT | Mod: 25

## 2022-12-13 RX ORDER — DOXYCYCLINE 100 MG/1
100 TABLET, FILM COATED ORAL
Qty: 14 | Refills: 0 | Status: ACTIVE | COMMUNITY
Start: 2022-08-19 | End: 1900-01-01

## 2022-12-13 RX ORDER — SIMVASTATIN 20 MG/1
20 TABLET, FILM COATED ORAL
Qty: 90 | Refills: 1 | Status: DISCONTINUED | COMMUNITY
Start: 2021-08-26 | End: 2022-12-13

## 2022-12-13 RX ORDER — METHYLPREDNISOLONE 4 MG/1
4 TABLET ORAL
Qty: 1 | Refills: 0 | Status: ACTIVE | COMMUNITY
Start: 2022-08-14 | End: 1900-01-01

## 2022-12-16 LAB
APPEARANCE: CLEAR
BILIRUB UR QL STRIP: NEGATIVE
BILIRUBIN URINE: NEGATIVE
BLOOD URINE: NEGATIVE
CLARITY UR: NORMAL
COLLECTION METHOD: NORMAL
COLOR: NORMAL
GLUCOSE QUALITATIVE U: NEGATIVE
GLUCOSE UR-MCNC: NEGATIVE
HCG UR QL: 0.2 EU/DL
HGB UR QL STRIP.AUTO: NORMAL
KETONES UR-MCNC: NEGATIVE
KETONES URINE: NEGATIVE
LEUKOCYTE ESTERASE UR QL STRIP: NORMAL
LEUKOCYTE ESTERASE URINE: ABNORMAL
NITRITE UR QL STRIP: NEGATIVE
NITRITE URINE: NEGATIVE
PH UR STRIP: 6
PH URINE: 6
PROT UR STRIP-MCNC: NEGATIVE
PROTEIN URINE: NEGATIVE
SP GR UR STRIP: 1.03
SPECIFIC GRAVITY URINE: 1.01
UROBILINOGEN URINE: NORMAL

## 2022-12-20 ENCOUNTER — APPOINTMENT (OUTPATIENT)
Dept: INTERNAL MEDICINE | Facility: CLINIC | Age: 77
End: 2022-12-20
Payer: MEDICARE

## 2022-12-20 ENCOUNTER — LABORATORY RESULT (OUTPATIENT)
Age: 77
End: 2022-12-20

## 2022-12-20 VITALS
SYSTOLIC BLOOD PRESSURE: 116 MMHG | DIASTOLIC BLOOD PRESSURE: 72 MMHG | RESPIRATION RATE: 18 BRPM | HEART RATE: 63 BPM | BODY MASS INDEX: 23.98 KG/M2 | OXYGEN SATURATION: 99 % | HEIGHT: 61 IN | WEIGHT: 127 LBS | TEMPERATURE: 97.9 F

## 2022-12-20 DIAGNOSIS — N39.0 URINARY TRACT INFECTION, SITE NOT SPECIFIED: ICD-10-CM

## 2022-12-20 DIAGNOSIS — R91.8 OTHER NONSPECIFIC ABNORMAL FINDING OF LUNG FIELD: ICD-10-CM

## 2022-12-20 DIAGNOSIS — I71.40 ABDOMINAL AORTIC ANEURYSM, WITHOUT RUPTURE, UNSPECIFIED: ICD-10-CM

## 2022-12-20 PROCEDURE — 99214 OFFICE O/P EST MOD 30 MIN: CPT | Mod: 25

## 2022-12-20 PROCEDURE — 81003 URINALYSIS AUTO W/O SCOPE: CPT | Mod: QW

## 2022-12-21 LAB
APPEARANCE: CLEAR
BILIRUBIN URINE: NEGATIVE
BLOOD URINE: NEGATIVE
COLOR: COLORLESS
GLUCOSE QUALITATIVE U: NEGATIVE
KETONES URINE: NEGATIVE
LEUKOCYTE ESTERASE URINE: ABNORMAL
NITRITE URINE: NEGATIVE
PH URINE: 6
PROTEIN URINE: NEGATIVE
SPECIFIC GRAVITY URINE: 1.01
UROBILINOGEN URINE: NORMAL

## 2022-12-27 PROBLEM — J22 LOWER RESP. TRACT INFECTION: Status: ACTIVE | Noted: 2022-12-13

## 2022-12-27 PROBLEM — U07.1 COVID-19: Status: ACTIVE | Noted: 2022-12-27

## 2022-12-27 NOTE — HISTORY OF PRESENT ILLNESS
[de-identified] : had covid 10 days ago\par now having worsening cough / bronchitis.  \par also has mild dysuria.  requests urine tests. \par CXR shows mild right hilar enlargement.  followup CT with contrast advised by radiology.  would like to avoid contrast.  advise pulm followup and interval followup CXR in 2 weeks. or sooner depending on clinical course. \par (12/16)UPDATE.  Discussed with patient by phone.  her urinary symptoms have improved.

## 2022-12-27 NOTE — REVIEW OF SYSTEMS
[Vision Problems] : vision problems [Wheezing] : wheezing [Cough] : cough [Dysuria] : dysuria [Negative] : Heme/Lymph [Chills] : no chills [Fatigue] : no fatigue [Discharge] : no discharge [Pain] : no pain [Itching] : no itching [Earache] : no earache [Hearing Loss] : no hearing loss [Nasal Discharge] : no nasal discharge [Sore Throat] : no sore throat [Chest Pain] : no chest pain [Palpitations] : no palpitations [Shortness Of Breath] : no shortness of breath [Dyspnea on Exertion] : no dyspnea on exertion [Abdominal Pain] : no abdominal pain [Nausea] : no nausea [Vomiting] : no vomiting [Heartburn] : no heartburn [Hematuria] : no hematuria [Joint Pain] : no joint pain [Muscle Pain] : no muscle pain [Skin Rash] : no skin rash [Headache] : no headache [Dizziness] : no dizziness [Anxiety] : no anxiety [Depression] : no depression [Easy Bleeding] : no easy bleeding [Easy Bruising] : no easy bruising

## 2022-12-27 NOTE — PHYSICAL EXAM
[No Acute Distress] : no acute distress [Well Nourished] : well nourished [Well Developed] : well developed [Well-Appearing] : well-appearing [Normal Sclera/Conjunctiva] : normal sclera/conjunctiva [PERRL] : pupils equal round and reactive to light [EOMI] : extraocular movements intact [Normal Outer Ear/Nose] : the outer ears and nose were normal in appearance [Normal Oropharynx] : the oropharynx was normal [No JVD] : no jugular venous distention [No Lymphadenopathy] : no lymphadenopathy [Supple] : supple [Thyroid Normal, No Nodules] : the thyroid was normal and there were no nodules present [No Respiratory Distress] : no respiratory distress  [No Accessory Muscle Use] : no accessory muscle use [Normal Rate] : normal rate  [Regular Rhythm] : with a regular rhythm [Normal S1, S2] : normal S1 and S2 [No Murmur] : no murmur heard [No Carotid Bruits] : no carotid bruits [No Abdominal Bruit] : a ~M bruit was not heard ~T in the abdomen [No Varicosities] : no varicosities [Pedal Pulses Present] : the pedal pulses are present [No Edema] : there was no peripheral edema [No Palpable Aorta] : no palpable aorta [No Extremity Clubbing/Cyanosis] : no extremity clubbing/cyanosis [Soft] : abdomen soft [Non Tender] : non-tender [Non-distended] : non-distended [No Masses] : no abdominal mass palpated [No HSM] : no HSM [Normal Bowel Sounds] : normal bowel sounds [Normal Posterior Cervical Nodes] : no posterior cervical lymphadenopathy [Normal Anterior Cervical Nodes] : no anterior cervical lymphadenopathy [No CVA Tenderness] : no CVA  tenderness [No Spinal Tenderness] : no spinal tenderness [No Joint Swelling] : no joint swelling [Grossly Normal Strength/Tone] : grossly normal strength/tone [No Rash] : no rash [Coordination Grossly Intact] : coordination grossly intact [No Focal Deficits] : no focal deficits [Normal Gait] : normal gait [Deep Tendon Reflexes (DTR)] : deep tendon reflexes were 2+ and symmetric [Normal Affect] : the affect was normal [Normal Insight/Judgement] : insight and judgment were intact [de-identified] : coarse breathe sounds / B/L end exp wheeze

## 2022-12-29 PROBLEM — I71.40 AAA (ABDOMINAL AORTIC ANEURYSM): Status: ACTIVE | Noted: 2021-09-21

## 2022-12-29 PROBLEM — N39.0 RECURRENT UTI: Status: ACTIVE | Noted: 2022-03-31

## 2022-12-30 PROBLEM — R91.8 HILAR ENLARGEMENT: Status: RESOLVED | Noted: 2022-12-20 | Resolved: 2022-12-27

## 2022-12-30 NOTE — HISTORY OF PRESENT ILLNESS
[FreeTextEntry1] : Cough, repeat UA [de-identified] : 78 y/o F presents with persistent but improving cough.\par Also in need of repeat urine testing, hx of recurrent UTI.\par States she is otherwise well and voices no further complaints. \par ROS as documented below. \par

## 2023-02-03 NOTE — DIETITIAN INITIAL EVALUATION ADULT. - PHYSICAL ASSESSMENT CLAVICLES
[address] [place] [address]  Endoscopy Note    Patient: eDon Plaza  : 1994  Acct#:     Procedure: Esophagogastroduodenoscopy    Date:  2/3/2023     Surgeon:  Dayna Duane, MD      Anesthesia:    IV propofol, per anesthesia       EBL: <50 mL    Indications: Heartburn, cough, abdominal pain, nausea    Description of Procedure:    Informed consent was obtained from the patient after explanation of indications, benefits and possible risks and complications of the procedure. The patient was then taken to the endoscopy suite, placed in the left lateral decubitus position and the above IV sedation was administrered. The Olympus videoendoscope (GIF-H190) was placed in the patient's mouth and under direct visualization passed into the esophagus. The scope was then advanced into the stomach and to the second portion of the duodenum. A retroflexed exam of the gastric cardia and fundus was performed. The scope was then withdrawn back into the stomach, it was decompressed, and the scope was completely withdrawn. Findings:  Normal first and second portion of the duodenum. A large phytobezoar was found in the gastric body. Much of the gastric mucosa remained obscured. Normal esophagus       The patient tolerated the procedure well and was taken to the post anesthesia care unit in good condition. Biopsies: no     Impression:    Normal first and second portion of the duodenum. A large phytobezoar was found in the gastric body. Much of the gastric mucosa remained obscured. Normal esophagus    Recommendations: Will discuss gastroparesis and its many causes including THC use. Continue PPI.        Dayna Duane, MD  [facility]

## 2023-02-08 ENCOUNTER — NON-APPOINTMENT (OUTPATIENT)
Age: 78
End: 2023-02-08

## 2023-02-16 ENCOUNTER — APPOINTMENT (OUTPATIENT)
Dept: INTERNAL MEDICINE | Facility: CLINIC | Age: 78
End: 2023-02-16
Payer: MEDICARE

## 2023-02-16 VITALS
RESPIRATION RATE: 17 BRPM | TEMPERATURE: 98.4 F | DIASTOLIC BLOOD PRESSURE: 80 MMHG | SYSTOLIC BLOOD PRESSURE: 118 MMHG | WEIGHT: 127 LBS | HEIGHT: 61 IN | HEART RATE: 57 BPM | BODY MASS INDEX: 23.98 KG/M2 | OXYGEN SATURATION: 99 %

## 2023-02-16 DIAGNOSIS — R91.8 OTHER NONSPECIFIC ABNORMAL FINDING OF LUNG FIELD: ICD-10-CM

## 2023-02-16 DIAGNOSIS — Z87.440 PERSONAL HISTORY OF URINARY (TRACT) INFECTIONS: ICD-10-CM

## 2023-02-16 LAB
BILIRUB UR QL STRIP: NEGATIVE
GLUCOSE UR-MCNC: NEGATIVE
HCG UR QL: 0.2 EU/DL
HGB UR QL STRIP.AUTO: NORMAL
KETONES UR-MCNC: NORMAL
LEUKOCYTE ESTERASE UR QL STRIP: NORMAL
NITRITE UR QL STRIP: NEGATIVE
PH UR STRIP: 5.5
PROT UR STRIP-MCNC: NEGATIVE
SP GR UR STRIP: 1.01

## 2023-02-16 PROCEDURE — 99214 OFFICE O/P EST MOD 30 MIN: CPT | Mod: 25

## 2023-02-16 PROCEDURE — 99496 TRANSJ CARE MGMT HIGH F2F 7D: CPT

## 2023-02-16 PROCEDURE — 81003 URINALYSIS AUTO W/O SCOPE: CPT | Mod: QW

## 2023-02-21 PROBLEM — Z87.440 HISTORY OF UTI: Status: ACTIVE | Noted: 2022-10-21

## 2023-02-23 ENCOUNTER — RX RENEWAL (OUTPATIENT)
Age: 78
End: 2023-02-23

## 2023-02-24 ENCOUNTER — RX RENEWAL (OUTPATIENT)
Age: 78
End: 2023-02-24

## 2023-03-01 PROBLEM — R91.8 HILAR ENLARGEMENT: Status: ACTIVE | Noted: 2022-12-27

## 2023-03-01 NOTE — HISTORY OF PRESENT ILLNESS
[FreeTextEntry1] : hospital discharge follow up  [de-identified] : 76 y/o F presents for a follow up after hospital discharge for A-fib / colitis. Amiodarone has been added to her medication regimen. Depending on clinical course, she will potentially need pacemaker in the future. She is requesting urine tests due to having a urine catheter in the hospital and her hx of UTI. She feels otherwise well and reports no other acute complaints or concerns. ROS as documented below.\par \par \par I Kaila Bancroft am scribing for and in the presence of Dr. Real Velasquez, the following sections: HISTORY OF PRESENT ILLNESS, PAST MEDICAL/FAMILY/SOCIAL HISTORY, REVIEW OF SYSTEMS, PHYSICAL EXAM; DISPOSITION.\par \par I personally performed the services described in the documentation, reviewed the documentation recorded by the scribe in my presence and it accurately and completely records my words and actions.\par

## 2023-06-15 ENCOUNTER — LABORATORY RESULT (OUTPATIENT)
Age: 78
End: 2023-06-15

## 2023-06-15 ENCOUNTER — APPOINTMENT (OUTPATIENT)
Dept: INTERNAL MEDICINE | Facility: CLINIC | Age: 78
End: 2023-06-15
Payer: MEDICARE

## 2023-06-15 ENCOUNTER — NON-APPOINTMENT (OUTPATIENT)
Age: 78
End: 2023-06-15

## 2023-06-15 VITALS
SYSTOLIC BLOOD PRESSURE: 130 MMHG | WEIGHT: 127 LBS | HEIGHT: 61 IN | BODY MASS INDEX: 23.98 KG/M2 | HEART RATE: 84 BPM | TEMPERATURE: 97 F | OXYGEN SATURATION: 97 % | DIASTOLIC BLOOD PRESSURE: 80 MMHG

## 2023-06-15 DIAGNOSIS — Z00.00 ENCOUNTER FOR GENERAL ADULT MEDICAL EXAMINATION W/OUT ABNORMAL FINDINGS: ICD-10-CM

## 2023-06-15 DIAGNOSIS — Z13.6 ENCOUNTER FOR SCREENING FOR CARDIOVASCULAR DISORDERS: ICD-10-CM

## 2023-06-15 PROCEDURE — 93000 ELECTROCARDIOGRAM COMPLETE: CPT

## 2023-06-15 PROCEDURE — G0439: CPT

## 2023-06-15 PROCEDURE — 99215 OFFICE O/P EST HI 40 MIN: CPT | Mod: 25

## 2023-06-15 PROCEDURE — 36415 COLL VENOUS BLD VENIPUNCTURE: CPT

## 2023-06-19 PROBLEM — Z13.6 SCREENING FOR HEART DISEASE: Status: ACTIVE | Noted: 2023-06-19

## 2023-06-19 PROBLEM — Z00.00 PREVENTATIVE HEALTH CARE: Status: ACTIVE | Noted: 2022-06-14

## 2023-06-23 LAB
25(OH)D3 SERPL-MCNC: 64.2 NG/ML
ALBUMIN SERPL ELPH-MCNC: 4.2 G/DL
ALP BLD-CCNC: 66 U/L
ALT SERPL-CCNC: 22 U/L
ANION GAP SERPL CALC-SCNC: 12 MMOL/L
APPEARANCE: CLEAR
AST SERPL-CCNC: 32 U/L
BILIRUB SERPL-MCNC: 0.5 MG/DL
BILIRUBIN URINE: NEGATIVE
BLOOD URINE: NEGATIVE
BUN SERPL-MCNC: 30 MG/DL
CALCIUM SERPL-MCNC: 9.5 MG/DL
CHLORIDE SERPL-SCNC: 103 MMOL/L
CHOLEST SERPL-MCNC: 171 MG/DL
CO2 SERPL-SCNC: 27 MMOL/L
COLOR: YELLOW
CREAT SERPL-MCNC: 1.1 MG/DL
EGFR: 51 ML/MIN/1.73M2
ESTIMATED AVERAGE GLUCOSE: 126 MG/DL
GLUCOSE QUALITATIVE U: NEGATIVE MG/DL
GLUCOSE SERPL-MCNC: 84 MG/DL
HBA1C MFR BLD HPLC: 6 %
HDLC SERPL-MCNC: 80 MG/DL
KETONES URINE: NEGATIVE MG/DL
LDLC SERPL CALC-MCNC: 68 MG/DL
LEUKOCYTE ESTERASE URINE: ABNORMAL
NITRITE URINE: NEGATIVE
NONHDLC SERPL-MCNC: 91 MG/DL
PH URINE: 6
POTASSIUM SERPL-SCNC: 4.2 MMOL/L
PROT SERPL-MCNC: 6.4 G/DL
PROTEIN URINE: NEGATIVE MG/DL
SODIUM SERPL-SCNC: 142 MMOL/L
SPECIFIC GRAVITY URINE: 1.02
TRIGL SERPL-MCNC: 118 MG/DL
TSH SERPL-ACNC: 2.9 UIU/ML
UROBILINOGEN URINE: 0.2 MG/DL

## 2023-06-27 NOTE — HISTORY OF PRESENT ILLNESS
[FreeTextEntry1] : annual wellness visit  [de-identified] : 76 y/o F presents for annual wellness visit. Hx of prediabetes, HLD, A-fib, anxiety. She feels well and reports no acute complaints or concerns. ROS as documented below.\par \par \par \par I Kaila Bancroft am scribing for and in the presence of Dr. Real Velasquez, the following sections: HISTORY OF PRESENT ILLNESS, PAST MEDICAL/FAMILY/SOCIAL HISTORY, REVIEW OF SYSTEMS, PHYSICAL EXAM; DISPOSITION.\par \par I personally performed the services described in the documentation, reviewed the documentation recorded by the scribe in my presence and it accurately and completely records my words and actions.\par \par

## 2023-07-13 ENCOUNTER — LABORATORY RESULT (OUTPATIENT)
Age: 78
End: 2023-07-13

## 2023-07-13 ENCOUNTER — APPOINTMENT (OUTPATIENT)
Dept: INTERNAL MEDICINE | Facility: CLINIC | Age: 78
End: 2023-07-13
Payer: MEDICARE

## 2023-07-13 VITALS
OXYGEN SATURATION: 95 % | HEART RATE: 63 BPM | TEMPERATURE: 98.1 F | HEIGHT: 61 IN | RESPIRATION RATE: 17 BRPM | SYSTOLIC BLOOD PRESSURE: 124 MMHG | DIASTOLIC BLOOD PRESSURE: 70 MMHG

## 2023-07-13 DIAGNOSIS — E78.5 HYPERLIPIDEMIA, UNSPECIFIED: ICD-10-CM

## 2023-07-13 DIAGNOSIS — I50.9 HEART FAILURE, UNSPECIFIED: ICD-10-CM

## 2023-07-13 PROCEDURE — 81003 URINALYSIS AUTO W/O SCOPE: CPT | Mod: QW

## 2023-07-13 PROCEDURE — 99214 OFFICE O/P EST MOD 30 MIN: CPT | Mod: 25

## 2023-07-13 RX ORDER — CIPROFLOXACIN HYDROCHLORIDE 500 MG/1
500 TABLET, FILM COATED ORAL
Qty: 14 | Refills: 0 | Status: ACTIVE | COMMUNITY
Start: 2023-07-13 | End: 1900-01-01

## 2023-07-18 LAB
APPEARANCE: CLEAR
BILIRUB UR QL STRIP: NEGATIVE
BILIRUBIN URINE: NEGATIVE
BLOOD URINE: NEGATIVE
COLOR: YELLOW
GLUCOSE QUALITATIVE U: NEGATIVE MG/DL
GLUCOSE UR-MCNC: NEGATIVE
HCG UR QL: 2 EU/DL
HGB UR QL STRIP.AUTO: NORMAL
KETONES UR-MCNC: NEGATIVE
KETONES URINE: NEGATIVE MG/DL
LEUKOCYTE ESTERASE UR QL STRIP: NORMAL
LEUKOCYTE ESTERASE URINE: ABNORMAL
NITRITE UR QL STRIP: NEGATIVE
NITRITE URINE: NEGATIVE
PH UR STRIP: 7
PH URINE: 7.5
PROT UR STRIP-MCNC: NEGATIVE
PROTEIN URINE: NEGATIVE MG/DL
SP GR UR STRIP: 1.02
SPECIFIC GRAVITY URINE: 1.02
UROBILINOGEN URINE: 1 MG/DL

## 2023-07-27 PROBLEM — I50.9 CHF (CONGESTIVE HEART FAILURE): Status: ACTIVE | Noted: 2022-05-10

## 2023-07-27 PROBLEM — E78.5 HYPERLIPIDEMIA: Status: ACTIVE | Noted: 2021-08-26

## 2023-07-27 NOTE — HISTORY OF PRESENT ILLNESS
[FreeTextEntry1] : Followup [de-identified] : 78 y/o female presents for followup and with concerns for dysuria over the past few days. She feels otherwise well and reports no other acute complaints or concerns. ROS as documented below.\par

## 2023-07-27 NOTE — END OF VISIT
[FreeTextEntry4] : I Princess Yap am scribing for and in the presence of Dr. Real Velasquez, the following sections: HISTORY OF PRESENT ILLNESS, PAST MEDICAL/FAMILY/SOCIAL HISTORY, REVIEW OF SYSTEMS, PHYSICAL EXAM; DISPOSITION.\par \par I personally performed the services described in the documentation, reviewed the documentation recorded by the scribe in my presence and it accurately and completely records my words and actions.

## 2023-08-16 ENCOUNTER — RX RENEWAL (OUTPATIENT)
Age: 78
End: 2023-08-16

## 2023-09-20 ENCOUNTER — APPOINTMENT (OUTPATIENT)
Dept: INTERNAL MEDICINE | Facility: CLINIC | Age: 78
End: 2023-09-20
Payer: MEDICARE

## 2023-09-20 VITALS
HEART RATE: 59 BPM | OXYGEN SATURATION: 98 % | TEMPERATURE: 97.5 F | HEIGHT: 61 IN | SYSTOLIC BLOOD PRESSURE: 110 MMHG | DIASTOLIC BLOOD PRESSURE: 70 MMHG | RESPIRATION RATE: 17 BRPM

## 2023-09-20 DIAGNOSIS — R30.0 DYSURIA: ICD-10-CM

## 2023-09-20 DIAGNOSIS — Z09 ENCOUNTER FOR FOLLOW-UP EXAMINATION AFTER COMPLETED TREATMENT FOR CONDITIONS OTHER THAN MALIGNANT NEOPLASM: ICD-10-CM

## 2023-09-20 LAB
BILIRUB UR QL STRIP: NEGATIVE
GLUCOSE UR-MCNC: NEGATIVE
HCG UR QL: 0.2 EU/DL
HGB UR QL STRIP.AUTO: NEGATIVE
KETONES UR-MCNC: NORMAL
LEUKOCYTE ESTERASE UR QL STRIP: NEGATIVE
NITRITE UR QL STRIP: NEGATIVE
PH UR STRIP: 5.5
PROT UR STRIP-MCNC: NEGATIVE
SP GR UR STRIP: >=1.03

## 2023-09-20 PROCEDURE — 99214 OFFICE O/P EST MOD 30 MIN: CPT | Mod: 25

## 2023-09-20 PROCEDURE — 81003 URINALYSIS AUTO W/O SCOPE: CPT | Mod: QW

## 2023-09-25 PROBLEM — R30.0 DYSURIA: Status: ACTIVE | Noted: 2022-12-13

## 2023-09-25 PROBLEM — Z09 HOSPITAL DISCHARGE FOLLOW-UP: Status: ACTIVE | Noted: 2021-12-17

## 2023-10-25 ENCOUNTER — RX RENEWAL (OUTPATIENT)
Age: 78
End: 2023-10-25

## 2023-11-13 ENCOUNTER — APPOINTMENT (OUTPATIENT)
Dept: INTERNAL MEDICINE | Facility: CLINIC | Age: 78
End: 2023-11-13
Payer: MEDICARE

## 2023-11-13 PROCEDURE — 90662 IIV NO PRSV INCREASED AG IM: CPT

## 2023-11-13 PROCEDURE — G0008: CPT

## 2023-11-18 ENCOUNTER — RX RENEWAL (OUTPATIENT)
Age: 78
End: 2023-11-18

## 2023-11-22 ENCOUNTER — RX RENEWAL (OUTPATIENT)
Age: 78
End: 2023-11-22

## 2023-11-22 RX ORDER — ATORVASTATIN CALCIUM 20 MG/1
20 TABLET, FILM COATED ORAL
Qty: 90 | Refills: 3 | Status: ACTIVE | COMMUNITY
Start: 2023-11-22 | End: 1900-01-01

## 2023-11-25 RX ORDER — ATORVASTATIN CALCIUM 20 MG/1
20 TABLET, FILM COATED ORAL DAILY
Qty: 90 | Refills: 3 | Status: ACTIVE | COMMUNITY
Start: 2022-12-13 | End: 1900-01-01

## 2023-12-01 NOTE — PATIENT PROFILE ADULT - ARRIVAL FROM
Update:  Pt currently in surgery.  CRISTAL met with pt's wife (Arlyn Abbott).  Pt's wife has arranged to check into the [facility] at 12:30p today.  FMLA paperwork from wife's employer was faxed to CRISTAL office.  CRISTAL provide paperwork to Ms Abbott.  CRISTAL walked Ms Abbott to Disability/FMLA desk on first floor to deliver paperwork for completion.    Pt's wife expressed no other needs at this time, and she reported she is doing well currently.  CRISTAL will follow.     Home

## 2024-01-02 ENCOUNTER — APPOINTMENT (OUTPATIENT)
Dept: INTERNAL MEDICINE | Facility: CLINIC | Age: 79
End: 2024-01-02
Payer: MEDICARE

## 2024-01-02 VITALS
BODY MASS INDEX: 23.98 KG/M2 | HEIGHT: 61 IN | TEMPERATURE: 97.9 F | SYSTOLIC BLOOD PRESSURE: 130 MMHG | RESPIRATION RATE: 17 BRPM | DIASTOLIC BLOOD PRESSURE: 80 MMHG | OXYGEN SATURATION: 96 % | WEIGHT: 127 LBS | HEART RATE: 71 BPM

## 2024-01-02 DIAGNOSIS — F41.9 ANXIETY DISORDER, UNSPECIFIED: ICD-10-CM

## 2024-01-02 DIAGNOSIS — I48.91 UNSPECIFIED ATRIAL FIBRILLATION: ICD-10-CM

## 2024-01-02 DIAGNOSIS — M48.50XA COLLAPSED VERTEBRA, NOT ELSEWHERE CLASSIFIED, SITE UNSPECIFIED, INITIAL ENCOUNTER FOR FRACTURE: ICD-10-CM

## 2024-01-02 DIAGNOSIS — R73.03 PREDIABETES.: ICD-10-CM

## 2024-01-02 PROCEDURE — 99214 OFFICE O/P EST MOD 30 MIN: CPT | Mod: 25

## 2024-01-02 PROCEDURE — 36415 COLL VENOUS BLD VENIPUNCTURE: CPT

## 2024-01-02 PROCEDURE — G2211 COMPLEX E/M VISIT ADD ON: CPT

## 2024-01-10 LAB
ALBUMIN SERPL ELPH-MCNC: 4.2 G/DL
ALP BLD-CCNC: 117 U/L
ALT SERPL-CCNC: 8 U/L
ANION GAP SERPL CALC-SCNC: 14 MMOL/L
AST SERPL-CCNC: 17 U/L
BASOPHILS # BLD AUTO: 0.07 K/UL
BASOPHILS NFR BLD AUTO: 0.7 %
BILIRUB SERPL-MCNC: 0.3 MG/DL
BUN SERPL-MCNC: 20 MG/DL
CALCIUM SERPL-MCNC: 9.6 MG/DL
CHLORIDE SERPL-SCNC: 103 MMOL/L
CO2 SERPL-SCNC: 24 MMOL/L
CREAT SERPL-MCNC: 0.99 MG/DL
EGFR: 58 ML/MIN/1.73M2
EOSINOPHIL # BLD AUTO: 0.02 K/UL
EOSINOPHIL NFR BLD AUTO: 0.2 %
ESTIMATED AVERAGE GLUCOSE: 117 MG/DL
GLUCOSE SERPL-MCNC: 120 MG/DL
HBA1C MFR BLD HPLC: 5.7 %
HCT VFR BLD CALC: 43.5 %
HGB BLD-MCNC: 13.1 G/DL
IMM GRANULOCYTES NFR BLD AUTO: 0.7 %
LYMPHOCYTES # BLD AUTO: 1.29 K/UL
LYMPHOCYTES NFR BLD AUTO: 12.9 %
MAN DIFF?: NORMAL
MCHC RBC-ENTMCNC: 27.2 PG
MCHC RBC-ENTMCNC: 30.1 GM/DL
MCV RBC AUTO: 90.4 FL
MONOCYTES # BLD AUTO: 0.68 K/UL
MONOCYTES NFR BLD AUTO: 6.8 %
NEUTROPHILS # BLD AUTO: 7.86 K/UL
NEUTROPHILS NFR BLD AUTO: 78.7 %
PLATELET # BLD AUTO: 235 K/UL
POTASSIUM SERPL-SCNC: 4.6 MMOL/L
PROT SERPL-MCNC: 6.2 G/DL
RBC # BLD: 4.81 M/UL
RBC # FLD: 15.6 %
SODIUM SERPL-SCNC: 141 MMOL/L
WBC # FLD AUTO: 9.99 K/UL

## 2024-01-16 PROBLEM — R73.03 PREDIABETES: Status: ACTIVE | Noted: 2022-06-16

## 2024-01-19 RX ORDER — CLINDAMYCIN HYDROCHLORIDE 300 MG/1
300 CAPSULE ORAL
Qty: 4 | Refills: 0 | Status: ACTIVE | COMMUNITY
Start: 2024-01-19 | End: 1900-01-01

## 2024-01-20 ENCOUNTER — RX RENEWAL (OUTPATIENT)
Age: 79
End: 2024-01-20

## 2024-01-21 PROBLEM — M48.50XA COMPRESSION FRACTURE OF SPINE: Status: ACTIVE | Noted: 2024-01-21

## 2024-01-21 PROBLEM — F41.9 ANXIETY: Status: ACTIVE | Noted: 2021-09-21

## 2024-01-21 PROBLEM — I48.91 A-FIB: Status: ACTIVE | Noted: 2021-12-17

## 2024-01-21 NOTE — HISTORY OF PRESENT ILLNESS
[FreeTextEntry1] : chronic back pain  [de-identified] : 77 y/o F presents with chronic back pain from compression fractures. She is currently following with Ortho, pending possible epidural injection. She needs Cardio clearance prior in terms of holding her blood thinners. She is a poor surgical candidate for any other surgeries that would require general anesthesia.   Follow up labs done in office today. She feels otherwise well and reports no other acute complaints or concerns. ROS as documented below.   I Kaila Bancroft am scribing for and in the presence of Dr. Real Velasquez, the following sections: HISTORY OF PRESENT ILLNESS, PAST MEDICAL/FAMILY/SOCIAL HISTORY, REVIEW OF SYSTEMS, PHYSICAL EXAM; DISPOSITION.  I personally performed the services described in the documentation, reviewed the documentation recorded by the scribe in my presence and it accurately and completely records my words and actions.

## 2024-03-08 NOTE — H&P PST ADULT - MEDICATION HERBAL REMEDIES, PROFILE
Please use antibiotic ointment on the open area of skin 2x daily  Keep area clean and dry  Cover with a nonstick bandage   no

## 2024-04-19 ENCOUNTER — RX RENEWAL (OUTPATIENT)
Age: 79
End: 2024-04-19

## 2024-04-22 RX ORDER — DIAZEPAM 5 MG/1
5 TABLET ORAL
Qty: 30 | Refills: 0 | Status: ACTIVE | COMMUNITY
Start: 1900-01-01 | End: 1900-01-01

## 2024-05-02 ENCOUNTER — APPOINTMENT (OUTPATIENT)
Dept: INTERNAL MEDICINE | Facility: CLINIC | Age: 79
End: 2024-05-02
Payer: MEDICARE

## 2024-05-02 ENCOUNTER — LABORATORY RESULT (OUTPATIENT)
Age: 79
End: 2024-05-02

## 2024-05-02 VITALS
BODY MASS INDEX: 23.98 KG/M2 | TEMPERATURE: 96.8 F | OXYGEN SATURATION: 97 % | HEART RATE: 64 BPM | SYSTOLIC BLOOD PRESSURE: 122 MMHG | HEIGHT: 61 IN | WEIGHT: 127 LBS | DIASTOLIC BLOOD PRESSURE: 64 MMHG | RESPIRATION RATE: 18 BRPM

## 2024-05-02 DIAGNOSIS — N39.0 URINARY TRACT INFECTION, SITE NOT SPECIFIED: ICD-10-CM

## 2024-05-02 DIAGNOSIS — R32 UNSPECIFIED URINARY INCONTINENCE: ICD-10-CM

## 2024-05-02 DIAGNOSIS — R35.0 FREQUENCY OF MICTURITION: ICD-10-CM

## 2024-05-02 LAB
BILIRUB UR QL STRIP: NEGATIVE
GLUCOSE UR-MCNC: NEGATIVE
HCG UR QL: 0.2 EU/DL
HGB UR QL STRIP.AUTO: NORMAL
KETONES UR-MCNC: NEGATIVE
LEUKOCYTE ESTERASE UR QL STRIP: NEGATIVE
NITRITE UR QL STRIP: NEGATIVE
PH UR STRIP: NEGATIVE
PROT UR STRIP-MCNC: 6.5
SP GR UR STRIP: 1.01

## 2024-05-02 PROCEDURE — 99213 OFFICE O/P EST LOW 20 MIN: CPT | Mod: 25

## 2024-05-02 PROCEDURE — 81003 URINALYSIS AUTO W/O SCOPE: CPT | Mod: QW

## 2024-05-02 NOTE — HISTORY OF PRESENT ILLNESS
[FreeTextEntry1] : Nocturia [de-identified] : Patient with a past medical history of urinary incontinence and frequent UTIs presents reporting more frequent urination at night often does not make it to the bathroom has loss of bladder control.  Stop drinking water trying to deal with frequency now has mild burning intermittently.  Denies pelvic pain denies blood in the urine nausea vomiting back pain etc.

## 2024-05-02 NOTE — ASSESSMENT
[FreeTextEntry1] : Urinary incontinence follow-up urology Urinary frequency urinary dipstick unremarkable will check UA urine culture.  Advised patient to avoid caffeinated beverages intervention pending results UA urine culture. Follow-up pending results

## 2024-05-04 LAB
APPEARANCE: CLEAR
BILIRUBIN URINE: NEGATIVE
BLOOD URINE: NEGATIVE
COLOR: YELLOW
GLUCOSE QUALITATIVE U: NEGATIVE MG/DL
KETONES URINE: NEGATIVE MG/DL
LEUKOCYTE ESTERASE URINE: ABNORMAL
NITRITE URINE: NEGATIVE
PH URINE: 5.5
PROTEIN URINE: NEGATIVE MG/DL
SPECIFIC GRAVITY URINE: 1.02
UROBILINOGEN URINE: 0.2 MG/DL

## 2024-06-25 NOTE — DISCHARGE NOTE PROVIDER - NSDCCPCAREPLAN_GEN_ALL_CORE_FT
PLAN:   1.   Symptomatic therapy suggested: Continue current medications as prescribed.    2.  Orders Placed This Encounter   Procedures    Comprehensive metabolic panel    Adult Neurology  Referral    Adult Cardiology Eval  Referral     3.  CONSULTATION/REFERRAL to follow with specialist neurology and Cardiology    Patient needs to follow up in if no improvement,or sooner if worsening of symptoms or other symptoms develop.        
PRINCIPAL DISCHARGE DIAGNOSIS  Diagnosis: Osteoarthritis of right hip  Assessment and Plan of Treatment:

## 2024-07-02 ENCOUNTER — APPOINTMENT (OUTPATIENT)
Dept: INTERNAL MEDICINE | Facility: CLINIC | Age: 79
End: 2024-07-02
Payer: MEDICARE

## 2024-07-02 VITALS
TEMPERATURE: 97.1 F | BODY MASS INDEX: 23.98 KG/M2 | SYSTOLIC BLOOD PRESSURE: 140 MMHG | DIASTOLIC BLOOD PRESSURE: 80 MMHG | HEIGHT: 61 IN | HEART RATE: 82 BPM | RESPIRATION RATE: 18 BRPM | OXYGEN SATURATION: 99 % | WEIGHT: 127 LBS

## 2024-07-02 DIAGNOSIS — R05.9 COUGH, UNSPECIFIED: ICD-10-CM

## 2024-07-02 DIAGNOSIS — D64.9 ANEMIA, UNSPECIFIED: ICD-10-CM

## 2024-07-02 DIAGNOSIS — U07.1 COVID-19: ICD-10-CM

## 2024-07-02 DIAGNOSIS — B34.9 VIRAL INFECTION, UNSPECIFIED: ICD-10-CM

## 2024-07-02 PROCEDURE — 99213 OFFICE O/P EST LOW 20 MIN: CPT

## 2024-07-02 PROCEDURE — 87804 INFLUENZA ASSAY W/OPTIC: CPT | Mod: QW

## 2024-07-02 PROCEDURE — 36415 COLL VENOUS BLD VENIPUNCTURE: CPT

## 2024-07-02 PROCEDURE — 87811 SARS-COV-2 COVID19 W/OPTIC: CPT | Mod: QW

## 2024-07-02 RX ORDER — AZITHROMYCIN 250 MG/1
250 TABLET, FILM COATED ORAL DAILY
Qty: 6 | Refills: 0 | Status: ACTIVE | COMMUNITY
Start: 2024-07-02 | End: 1900-01-01

## 2024-07-03 LAB
ALBUMIN SERPL ELPH-MCNC: 4.2 G/DL
ALP BLD-CCNC: 70 U/L
ALT SERPL-CCNC: 9 U/L
ANION GAP SERPL CALC-SCNC: 14 MMOL/L
AST SERPL-CCNC: 24 U/L
BASOPHILS # BLD AUTO: 0.02 K/UL
BASOPHILS NFR BLD AUTO: 0.2 %
BILIRUB SERPL-MCNC: 0.3 MG/DL
BUN SERPL-MCNC: 28 MG/DL
CALCIUM SERPL-MCNC: 9.1 MG/DL
CHLORIDE SERPL-SCNC: 101 MMOL/L
CO2 SERPL-SCNC: 25 MMOL/L
CREAT SERPL-MCNC: 0.94 MG/DL
EGFR: 62 ML/MIN/1.73M2
EOSINOPHIL # BLD AUTO: 0 K/UL
EOSINOPHIL NFR BLD AUTO: 0 %
FLUAV SPEC QL CULT: NEGATIVE
GLUCOSE SERPL-MCNC: 109 MG/DL
HCT VFR BLD CALC: 48.1 %
HGB BLD-MCNC: 15 G/DL
IMM GRANULOCYTES NFR BLD AUTO: 0.4 %
LYMPHOCYTES # BLD AUTO: 0.97 K/UL
LYMPHOCYTES NFR BLD AUTO: 10.7 %
MAN DIFF?: NORMAL
MCHC RBC-ENTMCNC: 27.8 PG
MCHC RBC-ENTMCNC: 31.2 GM/DL
MCV RBC AUTO: 89.1 FL
MONOCYTES # BLD AUTO: 0.55 K/UL
MONOCYTES NFR BLD AUTO: 6.1 %
NEUTROPHILS # BLD AUTO: 7.51 K/UL
NEUTROPHILS NFR BLD AUTO: 82.6 %
PLATELET # BLD AUTO: 209 K/UL
POTASSIUM SERPL-SCNC: 4.7 MMOL/L
PROT SERPL-MCNC: 6.3 G/DL
RBC # BLD: 5.4 M/UL
RBC # FLD: 15.3 %
SARS-COV-2 AG RESP QL IA.RAPID: POSITIVE
SODIUM SERPL-SCNC: 139 MMOL/L
WBC # FLD AUTO: 9.09 K/UL

## 2024-07-09 RX ORDER — DIAZEPAM 2 MG/1
2 TABLET ORAL
Qty: 30 | Refills: 0 | Status: ACTIVE | COMMUNITY
Start: 2024-07-09 | End: 1900-01-01

## 2024-07-18 ENCOUNTER — RX RENEWAL (OUTPATIENT)
Age: 79
End: 2024-07-18

## 2024-07-30 DIAGNOSIS — Z12.39 ENCOUNTER FOR OTHER SCREENING FOR MALIGNANT NEOPLASM OF BREAST: ICD-10-CM

## 2024-08-07 ENCOUNTER — LABORATORY RESULT (OUTPATIENT)
Age: 79
End: 2024-08-07

## 2024-08-07 ENCOUNTER — NON-APPOINTMENT (OUTPATIENT)
Age: 79
End: 2024-08-07

## 2024-08-07 ENCOUNTER — APPOINTMENT (OUTPATIENT)
Dept: INTERNAL MEDICINE | Facility: CLINIC | Age: 79
End: 2024-08-07

## 2024-08-07 PROBLEM — Z71.89 ADVANCED CARE PLANNING/COUNSELING DISCUSSION: Status: ACTIVE | Noted: 2024-08-07

## 2024-08-07 PROCEDURE — 93000 ELECTROCARDIOGRAM COMPLETE: CPT | Mod: 59

## 2024-08-07 PROCEDURE — 36415 COLL VENOUS BLD VENIPUNCTURE: CPT

## 2024-08-07 PROCEDURE — G0439: CPT

## 2024-08-07 PROCEDURE — 99497 ADVNCD CARE PLAN 30 MIN: CPT

## 2024-08-07 NOTE — HISTORY OF PRESENT ILLNESS
[FreeTextEntry1] : 78-year-old female presents for a Annual exam Hx of: Prediabetic, Asthma. THN  [de-identified] : 79 y/o F presents for annual wellness visit. Hx of prediabetes, HLD, A-fib, anxiety, AAA. CHF, no needs medications Refills.   She feels well and reports no acute complaints or concerns.  ROS as documented below. Denies fever, cough, chills, body aches and SOB.

## 2024-08-07 NOTE — COUNSELING
[Fall prevention counseling provided] : Fall prevention counseling provided [Adequate lighting] : Adequate lighting [No throw rugs] : No throw rugs [Use proper foot wear] : Use proper foot wear [Use recommended devices] : Use recommended devices [Behavioral health counseling provided] : Behavioral health counseling provided [Sleep ___ hours/day] : Sleep [unfilled] hours/day [Engage in a relaxing activity] : Engage in a relaxing activity [Plan in advance] : Plan in advance [None] : None [Good understanding] : Patient has a good understanding of lifestyle changes and steps needed to achieve self management goal [de-identified] :  Total face-to-face time with patient - __ minutes; >50% involved counselling, review of labs/tests, and/or coordination of medical care:  -Medical Annual wellness visit completed: -HRA completed and reviewed with patient -Medical, family, surgical history reviewed with patient and updated -List of current providers r/w patient and updated -Vitals, BMI reviewed and discussed along with healthy BMI goals. Dietary counseling x 15 minutes provided -Depression PHQ 9 completed and reviewed -Annual safety assessment reviewed -discussed advanced directives  -smoking cessation counseling provided  -Established routine screening and immunization schedule  -Medical Annual wellness visit completed: -HRA completed and reviewed with patient -Medical, family, surgical history reviewed with patient and updated -List of current providers r/w patient and updated -Vitals, BMI reviewed and discussed along with healthy BMI goals. Dietary counseling x 15 minutes provided -Depression PHQ 9 completed and reviewed -Annual safety assessment reviewed -discussed advanced directives  -smoking cessation counseling provided  -Established routine screening and immunization schedule  diet and exercise weight loss. Low-salt low-fat ADA diet/ htn- Discussed diabetes physiology -Discussed importance of monitoring blood glucose levels -Encouraged a low/fat cholesterol diet -Discussed symptoms of hyperglycemia and hypoglycemia -Discussed ADA glucose goals -Discussed HGB A1C and the effects of blood glucose on the level -Discussed Healthy eating, avoidance of concentrated sweets, and to include vegetables by at least 2 meals a day  -Discussed regular exercise -Discussed importance of follow up physician visits. Limit intake of Sodium (salt) to less than 2 grams a day to prevent fluid retention-swelling or worsening of symptoms. The importance of keeping the blood pressure at or below 130/80 to prevent stroke, heart attacks, kidney failure, blindness, and loss of limbs was low chol diet. Avoid fried foods, red meat, butter, eggs, hard cheeses. Use canola or olive oil preferred. Was established in which goals would be set, monitoring would be done, and problem solving would be addressed. The patient would be assisted using behavior change technique, such as self-help and counseling through behavioral modification: Problem solving using hypnosis and positive medical reinforcement to achieve agreed-upon goals.  Symptomatic patients: Test for influenza, if positive, treat for influenza and do not continue below.   1. Fever plus cough or shortness of breath: Test for RVP and COVID-19.   2.Indirect, circumstantial or unclear exposure to COVID-19, or other concerning cases not meeting above criteria: Please call [facility] to discuss testing.   +++ All above cases must be reported to the [facility] registry. +++    Asymptomatic patients:  1. Known first-degree direct-contact exposure to positive COVID-19 patient but asymptomatic: No testing PLUS 14 day self-quarantine. Pt to call if symptoms develop. Report to [facility] Registry.  2. No known exposure and asymptomatic, referred from outside healthcare organization: Please call [facility] to discuss testing.  3.All other asymptomatic patients with no known exposures: no testing, no exceptions.

## 2024-08-07 NOTE — PHYSICAL EXAM
[No Acute Distress] : no acute distress [Well Nourished] : well nourished [Well Developed] : well developed [Well-Appearing] : well-appearing [Normal Sclera/Conjunctiva] : normal sclera/conjunctiva [PERRL] : pupils equal round and reactive to light [EOMI] : extraocular movements intact [Normal Outer Ear/Nose] : the outer ears and nose were normal in appearance [No JVD] : no jugular venous distention [Normal Oropharynx] : the oropharynx was normal [No Lymphadenopathy] : no lymphadenopathy [Supple] : supple [Thyroid Normal, No Nodules] : the thyroid was normal and there were no nodules present [No Respiratory Distress] : no respiratory distress  [No Accessory Muscle Use] : no accessory muscle use [Clear to Auscultation] : lungs were clear to auscultation bilaterally [Normal Rate] : normal rate  [Regular Rhythm] : with a regular rhythm [Normal S1, S2] : normal S1 and S2 [No Murmur] : no murmur heard [No Carotid Bruits] : no carotid bruits [No Abdominal Bruit] : a ~M bruit was not heard ~T in the abdomen [No Varicosities] : no varicosities [Pedal Pulses Present] : the pedal pulses are present [No Edema] : there was no peripheral edema [No Palpable Aorta] : no palpable aorta [No Extremity Clubbing/Cyanosis] : no extremity clubbing/cyanosis [Soft] : abdomen soft [Non Tender] : non-tender [Non-distended] : non-distended [No Masses] : no abdominal mass palpated [No HSM] : no HSM [Normal Bowel Sounds] : normal bowel sounds [Normal Posterior Cervical Nodes] : no posterior cervical lymphadenopathy [Normal Anterior Cervical Nodes] : no anterior cervical lymphadenopathy [No CVA Tenderness] : no CVA  tenderness [No Spinal Tenderness] : no spinal tenderness [No Joint Swelling] : no joint swelling [Grossly Normal Strength/Tone] : grossly normal strength/tone [No Rash] : no rash [Coordination Grossly Intact] : coordination grossly intact [No Focal Deficits] : no focal deficits [Normal Gait] : normal gait [Normal Affect] : the affect was normal [Deep Tendon Reflexes (DTR)] : deep tendon reflexes were 2+ and symmetric [Normal Insight/Judgement] : insight and judgment were intact

## 2024-08-07 NOTE — HISTORY OF PRESENT ILLNESS
[FreeTextEntry1] : 78-year-old female presents for a Annual exam Hx of: Prediabetic, Asthma. THN  [de-identified] : 77 y/o F presents for annual wellness visit. Hx of prediabetes, HLD, A-fib, anxiety, AAA. CHF, no needs medications Refills.   She feels well and reports no acute complaints or concerns.  ROS as documented below. Denies fever, cough, chills, body aches and SOB.

## 2024-08-07 NOTE — REASON FOR VISIT
[Annual Wellness Visit] : an annual wellness visit [FreeTextEntry1] : annual wellness for 79 y/o female patient

## 2024-08-07 NOTE — HEALTH RISK ASSESSMENT
[Fair] :  ~his/her~ mood as fair [No] : No [Two or more falls in past year] : Patient reported two or more falls in the past year [0] : 2) Feeling down, depressed, or hopeless: Not at all (0) [Never] : Never [NO] : No [HIV test declined] : HIV test declined [Hepatitis C test declined] : Hepatitis C test declined [None] : None [With Family] : lives with family [Unemployed] : unemployed [College] : College [] :  [# Of Children ___] : has [unfilled] children [Feels Safe at Home] : Feels safe at home [Fully functional (bathing, dressing, toileting, transferring, walking, feeding)] : Fully functional (bathing, dressing, toileting, transferring, walking, feeding) [Fully functional (using the telephone, shopping, preparing meals, housekeeping, doing laundry, using] : Fully functional and needs no help or supervision to perform IADLs (using the telephone, shopping, preparing meals, housekeeping, doing laundry, using transportation, managing medications and managing finances) [Smoke Detector] : smoke detector [Carbon Monoxide Detector] : carbon monoxide detector [Safety elements used in home] : safety elements used in home [Seat Belt] :  uses seat belt [Sunscreen] : uses sunscreen [With Patient/Caregiver] : , with patient/caregiver [Designated Healthcare Proxy] : Designated healthcare proxy [Name: ___] : Health Care Proxy's Name: [unfilled]  [Relationship: ___] : Relationship: [unfilled] [Aggressive treatment] : aggressive treatment [I will adhere to the patient's wishes.] : I will adhere to the patient's wishes. [Time Spent: ___ minutes] : Time Spent: [unfilled] minutes [PHQ-2 Negative - No further assessment needed] : PHQ-2 Negative - No further assessment needed [I have developed a follow-up plan documented below in the note.] : I have developed a follow-up plan documented below in the note. [Time Spent: ___ Minutes] : I spent [unfilled] minutes performing a depression screening for this patient. [Audit-CScore] : 0 [de-identified] : Swimming  Pool [de-identified] : no [de-identified] : Rigt Fx, Pelvis Fracture [[id number]] : 0 [Change in mental status noted] : No change in mental status noted [Language] : denies difficulty with language [Behavior] : denies difficulty with behavior [Learning/Retaining New Information] : denies difficulty learning/retaining new information [Handling Complex Tasks] : denies difficulty handling complex tasks [Reasoning] : denies difficulty with reasoning [Spatial Ability and Orientation] : denies difficulty with spatial ability and orientation [Sexually Active] : not sexually active [Reports changes in hearing] : Reports no changes in hearing [Reports changes in vision] : Reports no changes in vision [Reports normal functional visual acuity (ie: able to read med bottle)] : Reports poor functional visual acuity.  [Reports changes in dental health] : Reports no changes in dental health [Travel to Developing Areas] : does not  travel to developing areas [TB Exposure] : is not being exposed to tuberculosis [Caregiver Concerns] : does not have caregiver concerns [ColonoscopyDate] : 8/20/2017 [ColonoscopyComments] : risk elvis [de-identified] : Spouse, Son [de-identified] : retired [AdvancecareDate] : 08/7/2024 [FreeTextEntry4] : ANGELIKA   [id number]

## 2024-08-07 NOTE — HEALTH RISK ASSESSMENT
[Fair] :  ~his/her~ mood as fair [No] : No [Two or more falls in past year] : Patient reported two or more falls in the past year [0] : 2) Feeling down, depressed, or hopeless: Not at all (0) [Never] : Never [NO] : No [HIV test declined] : HIV test declined [Hepatitis C test declined] : Hepatitis C test declined [None] : None [With Family] : lives with family [Unemployed] : unemployed [College] : College [] :  [# Of Children ___] : has [unfilled] children [Feels Safe at Home] : Feels safe at home [Fully functional (bathing, dressing, toileting, transferring, walking, feeding)] : Fully functional (bathing, dressing, toileting, transferring, walking, feeding) [Fully functional (using the telephone, shopping, preparing meals, housekeeping, doing laundry, using] : Fully functional and needs no help or supervision to perform IADLs (using the telephone, shopping, preparing meals, housekeeping, doing laundry, using transportation, managing medications and managing finances) [Smoke Detector] : smoke detector [Carbon Monoxide Detector] : carbon monoxide detector [Safety elements used in home] : safety elements used in home [Seat Belt] :  uses seat belt [Sunscreen] : uses sunscreen [With Patient/Caregiver] : , with patient/caregiver [Designated Healthcare Proxy] : Designated healthcare proxy [Name: ___] : Health Care Proxy's Name: [unfilled]  [Relationship: ___] : Relationship: [unfilled] [Aggressive treatment] : aggressive treatment [I will adhere to the patient's wishes.] : I will adhere to the patient's wishes. [Time Spent: ___ minutes] : Time Spent: [unfilled] minutes [PHQ-2 Negative - No further assessment needed] : PHQ-2 Negative - No further assessment needed [I have developed a follow-up plan documented below in the note.] : I have developed a follow-up plan documented below in the note. [Time Spent: ___ Minutes] : I spent [unfilled] minutes performing a depression screening for this patient. [Audit-CScore] : 0 [de-identified] : Swimming  Pool [de-identified] : no [de-identified] : Rigt Fx, Pelvis Fracture [[id number]] : 0 [Change in mental status noted] : No change in mental status noted [Language] : denies difficulty with language [Behavior] : denies difficulty with behavior [Learning/Retaining New Information] : denies difficulty learning/retaining new information [Handling Complex Tasks] : denies difficulty handling complex tasks [Reasoning] : denies difficulty with reasoning [Spatial Ability and Orientation] : denies difficulty with spatial ability and orientation [Sexually Active] : not sexually active [Reports changes in hearing] : Reports no changes in hearing [Reports changes in vision] : Reports no changes in vision [Reports normal functional visual acuity (ie: able to read med bottle)] : Reports poor functional visual acuity.  [Reports changes in dental health] : Reports no changes in dental health [Travel to Developing Areas] : does not  travel to developing areas [TB Exposure] : is not being exposed to tuberculosis [Caregiver Concerns] : does not have caregiver concerns [ColonoscopyDate] : 8/20/2017 [ColonoscopyComments] : risk elvis [de-identified] : Spouse, Son [de-identified] : retired [AdvancecareDate] : 08/7/2024 [FreeTextEntry4] : ANGELIKA   [id number]

## 2024-08-07 NOTE — COUNSELING
[Fall prevention counseling provided] : Fall prevention counseling provided [Adequate lighting] : Adequate lighting [No throw rugs] : No throw rugs [Use proper foot wear] : Use proper foot wear [Use recommended devices] : Use recommended devices [Behavioral health counseling provided] : Behavioral health counseling provided [Sleep ___ hours/day] : Sleep [unfilled] hours/day [Engage in a relaxing activity] : Engage in a relaxing activity [Plan in advance] : Plan in advance [None] : None [Good understanding] : Patient has a good understanding of lifestyle changes and steps needed to achieve self management goal [de-identified] :  Total face-to-face time with patient - __ minutes; >50% involved counselling, review of labs/tests, and/or coordination of medical care:  -Medical Annual wellness visit completed: -HRA completed and reviewed with patient -Medical, family, surgical history reviewed with patient and updated -List of current providers r/w patient and updated -Vitals, BMI reviewed and discussed along with healthy BMI goals. Dietary counseling x 15 minutes provided -Depression PHQ 9 completed and reviewed -Annual safety assessment reviewed -discussed advanced directives  -smoking cessation counseling provided  -Established routine screening and immunization schedule  -Medical Annual wellness visit completed: -HRA completed and reviewed with patient -Medical, family, surgical history reviewed with patient and updated -List of current providers r/w patient and updated -Vitals, BMI reviewed and discussed along with healthy BMI goals. Dietary counseling x 15 minutes provided -Depression PHQ 9 completed and reviewed -Annual safety assessment reviewed -discussed advanced directives  -smoking cessation counseling provided  -Established routine screening and immunization schedule  diet and exercise weight loss. Low-salt low-fat ADA diet/ htn- Discussed diabetes physiology -Discussed importance of monitoring blood glucose levels -Encouraged a low/fat cholesterol diet -Discussed symptoms of hyperglycemia and hypoglycemia -Discussed ADA glucose goals -Discussed HGB A1C and the effects of blood glucose on the level -Discussed Healthy eating, avoidance of concentrated sweets, and to include vegetables by at least 2 meals a day  -Discussed regular exercise -Discussed importance of follow up physician visits. Limit intake of Sodium (salt) to less than 2 grams a day to prevent fluid retention-swelling or worsening of symptoms. The importance of keeping the blood pressure at or below 130/80 to prevent stroke, heart attacks, kidney failure, blindness, and loss of limbs was low chol diet. Avoid fried foods, red meat, butter, eggs, hard cheeses. Use canola or olive oil preferred. Was established in which goals would be set, monitoring would be done, and problem solving would be addressed. The patient would be assisted using behavior change technique, such as self-help and counseling through behavioral modification: Problem solving using hypnosis and positive medical reinforcement to achieve agreed-upon goals.  Symptomatic patients: Test for influenza, if positive, treat for influenza and do not continue below.   1. Fever plus cough or shortness of breath: Test for RVP and COVID-19.   2.Indirect, circumstantial or unclear exposure to COVID-19, or other concerning cases not meeting above criteria: Please call [facility] to discuss testing.   +++ All above cases must be reported to the [facility] registry. +++    Asymptomatic patients:  1. Known first-degree direct-contact exposure to positive COVID-19 patient but asymptomatic: No testing PLUS 14 day self-quarantine. Pt to call if symptoms develop. Report to [facility] Registry.  2. No known exposure and asymptomatic, referred from outside healthcare organization: Please call [facility] to discuss testing.  3.All other asymptomatic patients with no known exposures: no testing, no exceptions.

## 2024-08-07 NOTE — REASON FOR VISIT
[Annual Wellness Visit] : an annual wellness visit [FreeTextEntry1] : annual wellness for 77 y/o female patient

## 2024-08-14 ENCOUNTER — RX RENEWAL (OUTPATIENT)
Age: 79
End: 2024-08-14

## 2024-10-08 ENCOUNTER — LABORATORY RESULT (OUTPATIENT)
Age: 79
End: 2024-10-08

## 2024-10-08 ENCOUNTER — APPOINTMENT (OUTPATIENT)
Dept: INTERNAL MEDICINE | Facility: CLINIC | Age: 79
End: 2024-10-08
Payer: MEDICARE

## 2024-10-08 VITALS
OXYGEN SATURATION: 97 % | DIASTOLIC BLOOD PRESSURE: 76 MMHG | HEART RATE: 68 BPM | RESPIRATION RATE: 18 BRPM | BODY MASS INDEX: 23.03 KG/M2 | WEIGHT: 122 LBS | HEIGHT: 61 IN | TEMPERATURE: 98.1 F | SYSTOLIC BLOOD PRESSURE: 162 MMHG

## 2024-10-08 DIAGNOSIS — J45.909 UNSPECIFIED ASTHMA, UNCOMPLICATED: ICD-10-CM

## 2024-10-08 DIAGNOSIS — F41.9 ANXIETY DISORDER, UNSPECIFIED: ICD-10-CM

## 2024-10-08 DIAGNOSIS — J43.9 EMPHYSEMA, UNSPECIFIED: ICD-10-CM

## 2024-10-08 DIAGNOSIS — I50.9 HEART FAILURE, UNSPECIFIED: ICD-10-CM

## 2024-10-08 DIAGNOSIS — I48.91 UNSPECIFIED ATRIAL FIBRILLATION: ICD-10-CM

## 2024-10-08 DIAGNOSIS — R03.0 ELEVATED BLOOD-PRESSURE READING, W/OUT DIAGNOSIS OF HYPERTENSION: ICD-10-CM

## 2024-10-08 PROCEDURE — G2211 COMPLEX E/M VISIT ADD ON: CPT

## 2024-10-08 PROCEDURE — 99214 OFFICE O/P EST MOD 30 MIN: CPT

## 2024-10-14 LAB
APPEARANCE: CLEAR
BILIRUBIN URINE: NEGATIVE
BLOOD URINE: NEGATIVE
COLOR: YELLOW
GLUCOSE QUALITATIVE U: NEGATIVE MG/DL
KETONES URINE: NEGATIVE MG/DL
LEUKOCYTE ESTERASE URINE: ABNORMAL
NITRITE URINE: NEGATIVE
PH URINE: 8
PROTEIN URINE: NEGATIVE MG/DL
SPECIFIC GRAVITY URINE: 1.02
UROBILINOGEN URINE: 1 MG/DL

## 2024-10-16 ENCOUNTER — RX RENEWAL (OUTPATIENT)
Age: 79
End: 2024-10-16

## 2024-12-20 ENCOUNTER — RX RENEWAL (OUTPATIENT)
Age: 79
End: 2024-12-20

## 2024-12-24 ENCOUNTER — RX RENEWAL (OUTPATIENT)
Age: 79
End: 2024-12-24

## 2024-12-27 ENCOUNTER — LABORATORY RESULT (OUTPATIENT)
Age: 79
End: 2024-12-27

## 2024-12-28 LAB
APPEARANCE: ABNORMAL
BILIRUBIN URINE: NEGATIVE
BLOOD URINE: ABNORMAL
COLOR: YELLOW
GLUCOSE QUALITATIVE U: NEGATIVE MG/DL
KETONES URINE: NEGATIVE MG/DL
LEUKOCYTE ESTERASE URINE: ABNORMAL
NITRITE URINE: NEGATIVE
PH URINE: 6
PROTEIN URINE: NEGATIVE MG/DL
SPECIFIC GRAVITY URINE: 1.01
UROBILINOGEN URINE: 0.2 MG/DL

## 2025-01-02 DIAGNOSIS — R39.9 UNSPECIFIED SYMPTOMS AND SIGNS INVOLVING THE GENITOURINARY SYSTEM: ICD-10-CM

## 2025-01-02 RX ORDER — CIPROFLOXACIN HYDROCHLORIDE 500 MG/1
500 TABLET, FILM COATED ORAL DAILY
Qty: 5 | Refills: 0 | Status: ACTIVE | COMMUNITY
Start: 2025-01-02 | End: 1900-01-01

## 2025-01-28 ENCOUNTER — RX RENEWAL (OUTPATIENT)
Age: 80
End: 2025-01-28

## 2025-02-13 ENCOUNTER — APPOINTMENT (OUTPATIENT)
Dept: INTERNAL MEDICINE | Facility: CLINIC | Age: 80
End: 2025-02-13
Payer: MEDICARE

## 2025-02-13 VITALS
HEART RATE: 80 BPM | DIASTOLIC BLOOD PRESSURE: 74 MMHG | SYSTOLIC BLOOD PRESSURE: 130 MMHG | HEIGHT: 61 IN | TEMPERATURE: 98.1 F | WEIGHT: 122 LBS | OXYGEN SATURATION: 98 % | RESPIRATION RATE: 18 BRPM | BODY MASS INDEX: 23.03 KG/M2

## 2025-02-13 DIAGNOSIS — J40 BRONCHITIS, NOT SPECIFIED AS ACUTE OR CHRONIC: ICD-10-CM

## 2025-02-13 PROCEDURE — 99213 OFFICE O/P EST LOW 20 MIN: CPT

## 2025-02-13 RX ORDER — AZITHROMYCIN 250 MG/1
250 TABLET, FILM COATED ORAL
Qty: 1 | Refills: 0 | Status: ACTIVE | COMMUNITY
Start: 2025-02-13 | End: 1900-01-01

## 2025-02-13 RX ORDER — BENZONATATE 100 MG/1
100 CAPSULE ORAL 3 TIMES DAILY
Qty: 30 | Refills: 0 | Status: ACTIVE | COMMUNITY
Start: 2025-02-13 | End: 1900-01-01

## 2025-02-20 ENCOUNTER — NON-APPOINTMENT (OUTPATIENT)
Age: 80
End: 2025-02-20

## 2025-02-20 ENCOUNTER — APPOINTMENT (OUTPATIENT)
Dept: INTERNAL MEDICINE | Facility: CLINIC | Age: 80
End: 2025-02-20

## 2025-02-20 VITALS
RESPIRATION RATE: 18 BRPM | DIASTOLIC BLOOD PRESSURE: 80 MMHG | HEIGHT: 61 IN | BODY MASS INDEX: 23.03 KG/M2 | OXYGEN SATURATION: 96 % | TEMPERATURE: 97.8 F | SYSTOLIC BLOOD PRESSURE: 136 MMHG | WEIGHT: 122 LBS | HEART RATE: 69 BPM

## 2025-02-20 DIAGNOSIS — Z01.818 ENCOUNTER FOR OTHER PREPROCEDURAL EXAMINATION: ICD-10-CM

## 2025-02-20 PROCEDURE — 36415 COLL VENOUS BLD VENIPUNCTURE: CPT

## 2025-02-20 PROCEDURE — 99213 OFFICE O/P EST LOW 20 MIN: CPT

## 2025-02-20 PROCEDURE — 93000 ELECTROCARDIOGRAM COMPLETE: CPT

## 2025-02-22 LAB
ALBUMIN SERPL ELPH-MCNC: 4 G/DL
ALP BLD-CCNC: 66 U/L
ALT SERPL-CCNC: 7 U/L
ANION GAP SERPL CALC-SCNC: 10 MMOL/L
APTT BLD: 32.6 SEC
AST SERPL-CCNC: 21 U/L
BASOPHILS # BLD AUTO: 0.1 K/UL
BASOPHILS NFR BLD AUTO: 0.8 %
BILIRUB SERPL-MCNC: 0.4 MG/DL
BUN SERPL-MCNC: 19 MG/DL
CALCIUM SERPL-MCNC: 9.5 MG/DL
CHLORIDE SERPL-SCNC: 103 MMOL/L
CO2 SERPL-SCNC: 28 MMOL/L
CREAT SERPL-MCNC: 1 MG/DL
EGFR: 57 ML/MIN/1.73M2
EOSINOPHIL # BLD AUTO: 0.02 K/UL
EOSINOPHIL NFR BLD AUTO: 0.2 %
GLUCOSE SERPL-MCNC: 102 MG/DL
HCT VFR BLD CALC: 47.2 %
HGB BLD-MCNC: 14.7 G/DL
IMM GRANULOCYTES NFR BLD AUTO: 1.6 %
INR PPP: 1.21 RATIO
LYMPHOCYTES # BLD AUTO: 1.29 K/UL
LYMPHOCYTES NFR BLD AUTO: 10.1 %
MAN DIFF?: NORMAL
MCHC RBC-ENTMCNC: 27.1 PG
MCHC RBC-ENTMCNC: 31.1 G/DL
MCV RBC AUTO: 86.9 FL
MONOCYTES # BLD AUTO: 0.56 K/UL
MONOCYTES NFR BLD AUTO: 4.4 %
NEUTROPHILS # BLD AUTO: 10.57 K/UL
NEUTROPHILS NFR BLD AUTO: 82.9 %
PLATELET # BLD AUTO: 234 K/UL
POTASSIUM SERPL-SCNC: 4.5 MMOL/L
PROT SERPL-MCNC: 6.2 G/DL
PT BLD: 14.2 SEC
RBC # BLD: 5.43 M/UL
RBC # FLD: 16 %
SODIUM SERPL-SCNC: 141 MMOL/L
WBC # FLD AUTO: 12.74 K/UL

## 2025-02-26 ENCOUNTER — NON-APPOINTMENT (OUTPATIENT)
Age: 80
End: 2025-02-26

## 2025-02-27 ENCOUNTER — APPOINTMENT (OUTPATIENT)
Dept: INTERNAL MEDICINE | Facility: CLINIC | Age: 80
End: 2025-02-27

## 2025-02-27 ENCOUNTER — RX RENEWAL (OUTPATIENT)
Age: 80
End: 2025-02-27

## 2025-02-27 VITALS
DIASTOLIC BLOOD PRESSURE: 80 MMHG | BODY MASS INDEX: 23.03 KG/M2 | WEIGHT: 122 LBS | TEMPERATURE: 96.9 F | OXYGEN SATURATION: 98 % | SYSTOLIC BLOOD PRESSURE: 128 MMHG | RESPIRATION RATE: 18 BRPM | HEART RATE: 122 BPM | HEIGHT: 61 IN

## 2025-02-27 DIAGNOSIS — D72.829 ELEVATED WHITE BLOOD CELL COUNT, UNSPECIFIED: ICD-10-CM

## 2025-02-27 PROCEDURE — 36415 COLL VENOUS BLD VENIPUNCTURE: CPT

## 2025-02-27 PROCEDURE — 99213 OFFICE O/P EST LOW 20 MIN: CPT

## 2025-02-28 ENCOUNTER — RX RENEWAL (OUTPATIENT)
Age: 80
End: 2025-02-28

## 2025-02-28 LAB
BASOPHILS # BLD AUTO: 0.08 K/UL
BASOPHILS NFR BLD AUTO: 0.6 %
EOSINOPHIL # BLD AUTO: 0.03 K/UL
EOSINOPHIL NFR BLD AUTO: 0.2 %
HCT VFR BLD CALC: 47.8 %
HGB BLD-MCNC: 14.5 G/DL
IMM GRANULOCYTES NFR BLD AUTO: 0.7 %
INR PPP: 1.08 RATIO
LYMPHOCYTES # BLD AUTO: 1.07 K/UL
LYMPHOCYTES NFR BLD AUTO: 7.8 %
MAN DIFF?: NORMAL
MCHC RBC-ENTMCNC: 27.2 PG
MCHC RBC-ENTMCNC: 30.3 G/DL
MCV RBC AUTO: 89.7 FL
MONOCYTES # BLD AUTO: 0.56 K/UL
MONOCYTES NFR BLD AUTO: 4.1 %
NEUTROPHILS # BLD AUTO: 11.91 K/UL
NEUTROPHILS NFR BLD AUTO: 86.6 %
PLATELET # BLD AUTO: 202 K/UL
PT BLD: 12.7 SEC
RBC # BLD: 5.33 M/UL
RBC # FLD: 16.4 %
WBC # FLD AUTO: 13.75 K/UL

## 2025-03-20 PROBLEM — R79.1 ABNORMAL COAGULATION PROFILE: Status: ACTIVE | Noted: 2025-03-20

## 2025-04-01 ENCOUNTER — RX RENEWAL (OUTPATIENT)
Age: 80
End: 2025-04-01

## 2025-05-28 ENCOUNTER — APPOINTMENT (OUTPATIENT)
Dept: INTERNAL MEDICINE | Facility: CLINIC | Age: 80
End: 2025-05-28
Payer: MEDICARE

## 2025-05-28 ENCOUNTER — RX RENEWAL (OUTPATIENT)
Age: 80
End: 2025-05-28

## 2025-05-28 VITALS
WEIGHT: 123 LBS | SYSTOLIC BLOOD PRESSURE: 130 MMHG | BODY MASS INDEX: 23.22 KG/M2 | HEART RATE: 66 BPM | OXYGEN SATURATION: 97 % | HEIGHT: 61 IN | DIASTOLIC BLOOD PRESSURE: 82 MMHG | TEMPERATURE: 98.6 F | RESPIRATION RATE: 18 BRPM

## 2025-05-28 DIAGNOSIS — M48.50XA COLLAPSED VERTEBRA, NOT ELSEWHERE CLASSIFIED, SITE UNSPECIFIED, INITIAL ENCOUNTER FOR FRACTURE: ICD-10-CM

## 2025-05-28 DIAGNOSIS — J43.9 EMPHYSEMA, UNSPECIFIED: ICD-10-CM

## 2025-05-28 DIAGNOSIS — N39.0 URINARY TRACT INFECTION, SITE NOT SPECIFIED: ICD-10-CM

## 2025-05-28 DIAGNOSIS — J45.909 UNSPECIFIED ASTHMA, UNCOMPLICATED: ICD-10-CM

## 2025-05-28 DIAGNOSIS — Z87.440 PERSONAL HISTORY OF URINARY (TRACT) INFECTIONS: ICD-10-CM

## 2025-05-28 DIAGNOSIS — I48.91 UNSPECIFIED ATRIAL FIBRILLATION: ICD-10-CM

## 2025-05-28 DIAGNOSIS — I50.9 HEART FAILURE, UNSPECIFIED: ICD-10-CM

## 2025-05-28 DIAGNOSIS — I71.40 ABDOMINAL AORTIC ANEURYSM, WITHOUT RUPTURE, UNSPECIFIED: ICD-10-CM

## 2025-05-28 DIAGNOSIS — M19.90 UNSPECIFIED OSTEOARTHRITIS, UNSPECIFIED SITE: ICD-10-CM

## 2025-05-28 DIAGNOSIS — F41.9 ANXIETY DISORDER, UNSPECIFIED: ICD-10-CM

## 2025-05-28 LAB
BILIRUB UR QL STRIP: NEGATIVE
GLUCOSE UR-MCNC: NEGATIVE
HCG UR QL: 1 EU/DL
HGB UR QL STRIP.AUTO: NORMAL
KETONES UR-MCNC: NEGATIVE
LEUKOCYTE ESTERASE UR QL STRIP: NEGATIVE
NITRITE UR QL STRIP: NEGATIVE
PH UR STRIP: 7
PROT UR STRIP-MCNC: NEGATIVE
SP GR UR STRIP: 1.01

## 2025-05-28 PROCEDURE — 81003 URINALYSIS AUTO W/O SCOPE: CPT | Mod: QW

## 2025-05-28 PROCEDURE — G2211 COMPLEX E/M VISIT ADD ON: CPT

## 2025-05-28 PROCEDURE — 99214 OFFICE O/P EST MOD 30 MIN: CPT

## 2025-05-28 RX ORDER — PREDNISONE 10 MG/1
10 TABLET ORAL
Qty: 30 | Refills: 0 | Status: ACTIVE | COMMUNITY
Start: 2025-05-28 | End: 1900-01-01

## 2025-05-30 LAB
APPEARANCE: CLEAR
BILIRUBIN URINE: NEGATIVE
BLOOD URINE: NEGATIVE
COLOR: YELLOW
GLUCOSE QUALITATIVE U: NEGATIVE MG/DL
KETONES URINE: NEGATIVE MG/DL
LEUKOCYTE ESTERASE URINE: NEGATIVE
NITRITE URINE: NEGATIVE
PH URINE: 7
PROTEIN URINE: NEGATIVE MG/DL
SPECIFIC GRAVITY URINE: 1.02
UROBILINOGEN URINE: 1 MG/DL

## 2025-08-12 ENCOUNTER — APPOINTMENT (OUTPATIENT)
Dept: INTERNAL MEDICINE | Facility: CLINIC | Age: 80
End: 2025-08-12
Payer: MEDICARE

## 2025-08-12 VITALS
SYSTOLIC BLOOD PRESSURE: 144 MMHG | BODY MASS INDEX: 23.03 KG/M2 | HEIGHT: 61 IN | OXYGEN SATURATION: 98 % | WEIGHT: 122 LBS | HEART RATE: 78 BPM | TEMPERATURE: 98.4 F | DIASTOLIC BLOOD PRESSURE: 82 MMHG | RESPIRATION RATE: 18 BRPM

## 2025-08-12 DIAGNOSIS — J43.9 EMPHYSEMA, UNSPECIFIED: ICD-10-CM

## 2025-08-12 DIAGNOSIS — I50.9 HEART FAILURE, UNSPECIFIED: ICD-10-CM

## 2025-08-12 DIAGNOSIS — I71.40 ABDOMINAL AORTIC ANEURYSM, WITHOUT RUPTURE, UNSPECIFIED: ICD-10-CM

## 2025-08-12 DIAGNOSIS — M48.50XA COLLAPSED VERTEBRA, NOT ELSEWHERE CLASSIFIED, SITE UNSPECIFIED, INITIAL ENCOUNTER FOR FRACTURE: ICD-10-CM

## 2025-08-12 DIAGNOSIS — Z00.00 ENCOUNTER FOR GENERAL ADULT MEDICAL EXAMINATION W/OUT ABNORMAL FINDINGS: ICD-10-CM

## 2025-08-12 DIAGNOSIS — Z71.89 OTHER SPECIFIED COUNSELING: ICD-10-CM

## 2025-08-12 DIAGNOSIS — Z12.39 ENCOUNTER FOR OTHER SCREENING FOR MALIGNANT NEOPLASM OF BREAST: ICD-10-CM

## 2025-08-12 DIAGNOSIS — I48.91 UNSPECIFIED ATRIAL FIBRILLATION: ICD-10-CM

## 2025-08-12 DIAGNOSIS — J45.909 UNSPECIFIED ASTHMA, UNCOMPLICATED: ICD-10-CM

## 2025-08-12 PROCEDURE — 36415 COLL VENOUS BLD VENIPUNCTURE: CPT

## 2025-08-12 PROCEDURE — 99215 OFFICE O/P EST HI 40 MIN: CPT | Mod: 25

## 2025-08-12 PROCEDURE — 99497 ADVNCD CARE PLAN 30 MIN: CPT

## 2025-08-12 PROCEDURE — G0439: CPT

## 2025-08-17 LAB
25(OH)D3 SERPL-MCNC: 86.6 NG/ML
ALBUMIN SERPL ELPH-MCNC: 4.2 G/DL
ALBUMIN, RANDOM URINE: 2.2 MG/DL
ALP BLD-CCNC: 66 U/L
ALT SERPL-CCNC: 13 U/L
ANION GAP SERPL CALC-SCNC: 16 MMOL/L
APPEARANCE: CLEAR
AST SERPL-CCNC: 25 U/L
BASOPHILS # BLD AUTO: 0.1 K/UL
BASOPHILS NFR BLD AUTO: 0.9 %
BILIRUB SERPL-MCNC: 0.5 MG/DL
BILIRUBIN URINE: NEGATIVE
BLOOD URINE: NEGATIVE
BUN SERPL-MCNC: 26 MG/DL
CALCIUM SERPL-MCNC: 9.8 MG/DL
CHLORIDE SERPL-SCNC: 102 MMOL/L
CHOLEST SERPL-MCNC: 175 MG/DL
CO2 SERPL-SCNC: 24 MMOL/L
COLOR: YELLOW
CREAT SERPL-MCNC: 1.01 MG/DL
CREAT SPEC-SCNC: 161 MG/DL
EGFRCR SERPLBLD CKD-EPI 2021: 56 ML/MIN/1.73M2
EOSINOPHIL # BLD AUTO: 0.12 K/UL
EOSINOPHIL NFR BLD AUTO: 1.1 %
ESTIMATED AVERAGE GLUCOSE: 123 MG/DL
GGT SERPL-CCNC: 18 U/L
GLUCOSE QUALITATIVE U: NEGATIVE
GLUCOSE SERPL-MCNC: 75 MG/DL
HBA1C MFR BLD HPLC: 5.9 %
HBV SURFACE AB SER QL: NONREACTIVE
HCT VFR BLD CALC: 47.9 %
HCV AB SER QL: NONREACTIVE
HCV S/CO RATIO: 0.07 S/CO
HDLC SERPL-MCNC: 83 MG/DL
HGB BLD-MCNC: 15 G/DL
IMM GRANULOCYTES NFR BLD AUTO: 0.8 %
KETONES URINE: NEGATIVE
LDLC SERPL-MCNC: 74 MG/DL
LEUKOCYTE ESTERASE URINE: NEGATIVE
LYMPHOCYTES # BLD AUTO: 2.91 K/UL
LYMPHOCYTES NFR BLD AUTO: 25.7 %
MAN DIFF?: NORMAL
MCHC RBC-ENTMCNC: 27.5 PG
MCHC RBC-ENTMCNC: 31.3 G/DL
MCV RBC AUTO: 87.7 FL
MICROALBUMIN/CREAT 24H UR-RTO: 14 MG/G
MONOCYTES # BLD AUTO: 1.07 K/UL
MONOCYTES NFR BLD AUTO: 9.4 %
NEUTROPHILS # BLD AUTO: 7.05 K/UL
NEUTROPHILS NFR BLD AUTO: 62.1 %
NITRITE URINE: NEGATIVE
NONHDLC SERPL-MCNC: 92 MG/DL
PH URINE: 6
PLATELET # BLD AUTO: 213 K/UL
POTASSIUM SERPL-SCNC: 4.1 MMOL/L
PROT SERPL-MCNC: 6.4 G/DL
PROTEIN URINE: NEGATIVE
RBC # BLD: 5.46 M/UL
RBC # FLD: 15.9 %
SODIUM SERPL-SCNC: 143 MMOL/L
SPECIFIC GRAVITY URINE: 1.01
TRIGL SERPL-MCNC: 105 MG/DL
TSH SERPL-ACNC: 3.36 UIU/ML
UROBILINOGEN URINE: NORMAL
VIT B12 SERPL-MCNC: 761 PG/ML
WBC # FLD AUTO: 11.34 K/UL

## 2025-08-27 ENCOUNTER — NON-APPOINTMENT (OUTPATIENT)
Age: 80
End: 2025-08-27